# Patient Record
Sex: MALE | Race: WHITE | Employment: OTHER
[De-identification: names, ages, dates, MRNs, and addresses within clinical notes are randomized per-mention and may not be internally consistent; named-entity substitution may affect disease eponyms.]

---

## 2017-01-09 ENCOUNTER — TELEPHONE (OUTPATIENT)
Dept: NEUROLOGY | Facility: CLINIC | Age: 82
End: 2017-01-09

## 2017-01-17 ENCOUNTER — TELEPHONE (OUTPATIENT)
Dept: CARDIOLOGY | Facility: CLINIC | Age: 82
End: 2017-01-17

## 2017-01-17 DIAGNOSIS — R47.01 GLOBAL APHASIA: ICD-10-CM

## 2017-01-17 DIAGNOSIS — I63.412 CEREBRAL INFARCTION DUE TO EMBOLISM OF LEFT MIDDLE CEREBRAL ARTERY (HCC): ICD-10-CM

## 2017-01-17 DIAGNOSIS — E11.8 TYPE 2 DIABETES MELLITUS WITH COMPLICATION, WITH LONG-TERM CURRENT USE OF INSULIN (HCC): ICD-10-CM

## 2017-01-17 DIAGNOSIS — E55.9 UNSPECIFIED VITAMIN D DEFICIENCY: ICD-10-CM

## 2017-01-17 DIAGNOSIS — I69.391 DYSPHAGIA FOLLOWING CEREBROVASCULAR ACCIDENT: ICD-10-CM

## 2017-01-17 DIAGNOSIS — I63.512 ACUTE ISCHEMIC LEFT MCA STROKE (HCC): ICD-10-CM

## 2017-01-17 DIAGNOSIS — Z74.09 MOBILITY IMPAIRED: ICD-10-CM

## 2017-01-17 DIAGNOSIS — J96.01 ACUTE RESPIRATORY FAILURE WITH HYPOXIA (HCC): ICD-10-CM

## 2017-01-17 DIAGNOSIS — I10 ESSENTIAL HYPERTENSION, BENIGN: ICD-10-CM

## 2017-01-17 DIAGNOSIS — E78.2 MIXED HYPERLIPIDEMIA: Primary | ICD-10-CM

## 2017-01-17 DIAGNOSIS — G93.6 CEREBRAL EDEMA (HCC): ICD-10-CM

## 2017-01-17 DIAGNOSIS — Z79.4 TYPE 2 DIABETES MELLITUS WITH COMPLICATION, WITH LONG-TERM CURRENT USE OF INSULIN (HCC): ICD-10-CM

## 2017-01-17 DIAGNOSIS — I16.1 HYPERTENSIVE EMERGENCY: ICD-10-CM

## 2017-01-17 DIAGNOSIS — N17.9 AKI (ACUTE KIDNEY INJURY) (HCC): ICD-10-CM

## 2017-01-25 ENCOUNTER — INITIAL CONSULT (OUTPATIENT)
Dept: SURGERY | Facility: CLINIC | Age: 82
End: 2017-01-25

## 2017-01-25 VITALS
SYSTOLIC BLOOD PRESSURE: 129 MMHG | DIASTOLIC BLOOD PRESSURE: 70 MMHG | BODY MASS INDEX: 24.22 KG/M2 | HEIGHT: 71 IN | WEIGHT: 173 LBS | RESPIRATION RATE: 18 BRPM | HEART RATE: 76 BPM

## 2017-01-25 DIAGNOSIS — K94.23 LEAKING PEG TUBE (HCC): Primary | ICD-10-CM

## 2017-01-25 DIAGNOSIS — R19.4 CHANGE IN BOWEL HABITS: ICD-10-CM

## 2017-01-25 PROCEDURE — G8427 DOCREV CUR MEDS BY ELIG CLIN: HCPCS | Performed by: SURGERY

## 2017-01-25 PROCEDURE — 4040F PNEUMOC VAC/ADMIN/RCVD: CPT | Performed by: SURGERY

## 2017-01-25 PROCEDURE — 1036F TOBACCO NON-USER: CPT | Performed by: SURGERY

## 2017-01-25 PROCEDURE — 1123F ACP DISCUSS/DSCN MKR DOCD: CPT | Performed by: SURGERY

## 2017-01-25 PROCEDURE — 99204 OFFICE O/P NEW MOD 45 MIN: CPT | Performed by: SURGERY

## 2017-01-25 PROCEDURE — G8484 FLU IMMUNIZE NO ADMIN: HCPCS | Performed by: SURGERY

## 2017-01-25 PROCEDURE — G8598 ASA/ANTIPLAT THER USED: HCPCS | Performed by: SURGERY

## 2017-01-25 PROCEDURE — G8420 CALC BMI NORM PARAMETERS: HCPCS | Performed by: SURGERY

## 2017-01-25 PROCEDURE — 1111F DSCHRG MED/CURRENT MED MERGE: CPT | Performed by: SURGERY

## 2017-01-29 ASSESSMENT — ENCOUNTER SYMPTOMS
SORE THROAT: 0
DIARRHEA: 1
SHORTNESS OF BREATH: 0
NAUSEA: 0
VOMITING: 0
COUGH: 0
TROUBLE SWALLOWING: 1
ABDOMINAL PAIN: 0
CHOKING: 1
BACK PAIN: 0
BLOOD IN STOOL: 0

## 2017-01-31 ENCOUNTER — OFFICE VISIT (OUTPATIENT)
Dept: CARDIOLOGY | Facility: CLINIC | Age: 82
End: 2017-01-31

## 2017-01-31 VITALS
RESPIRATION RATE: 16 BRPM | OXYGEN SATURATION: 93 % | HEART RATE: 78 BPM | DIASTOLIC BLOOD PRESSURE: 60 MMHG | SYSTOLIC BLOOD PRESSURE: 120 MMHG

## 2017-01-31 DIAGNOSIS — E11.8 TYPE 2 DIABETES MELLITUS WITH COMPLICATION, WITH LONG-TERM CURRENT USE OF INSULIN (HCC): ICD-10-CM

## 2017-01-31 DIAGNOSIS — Z79.4 TYPE 2 DIABETES MELLITUS WITH COMPLICATION, WITH LONG-TERM CURRENT USE OF INSULIN (HCC): ICD-10-CM

## 2017-01-31 DIAGNOSIS — E55.9 UNSPECIFIED VITAMIN D DEFICIENCY: ICD-10-CM

## 2017-01-31 DIAGNOSIS — I48.91 NEW ONSET A-FIB (HCC): Primary | ICD-10-CM

## 2017-01-31 DIAGNOSIS — E78.2 MIXED HYPERLIPIDEMIA: ICD-10-CM

## 2017-01-31 DIAGNOSIS — I10 ESSENTIAL HYPERTENSION, BENIGN: ICD-10-CM

## 2017-01-31 DIAGNOSIS — I63.512 ACUTE ISCHEMIC LEFT MCA STROKE (HCC): ICD-10-CM

## 2017-01-31 PROCEDURE — 99204 OFFICE O/P NEW MOD 45 MIN: CPT | Performed by: INTERNAL MEDICINE

## 2017-01-31 PROCEDURE — G8420 CALC BMI NORM PARAMETERS: HCPCS | Performed by: INTERNAL MEDICINE

## 2017-01-31 PROCEDURE — 1123F ACP DISCUSS/DSCN MKR DOCD: CPT | Performed by: INTERNAL MEDICINE

## 2017-01-31 PROCEDURE — G8427 DOCREV CUR MEDS BY ELIG CLIN: HCPCS | Performed by: INTERNAL MEDICINE

## 2017-01-31 PROCEDURE — G8484 FLU IMMUNIZE NO ADMIN: HCPCS | Performed by: INTERNAL MEDICINE

## 2017-01-31 PROCEDURE — 1036F TOBACCO NON-USER: CPT | Performed by: INTERNAL MEDICINE

## 2017-01-31 PROCEDURE — 4040F PNEUMOC VAC/ADMIN/RCVD: CPT | Performed by: INTERNAL MEDICINE

## 2017-03-14 ENCOUNTER — HOSPITAL ENCOUNTER (OUTPATIENT)
Age: 82
Setting detail: SPECIMEN
Discharge: HOME | End: 2017-03-14
Payer: COMMERCIAL

## 2017-03-14 ENCOUNTER — HOSPITAL ENCOUNTER (OUTPATIENT)
Dept: PHARMACY | Age: 82
Discharge: HOME | End: 2017-03-14

## 2017-03-14 LAB
INR BLD: 4.3
PROTHROMBIN TIME: 46.6 SEC (ref 9–11.6)

## 2017-03-14 PROCEDURE — 85610 PROTHROMBIN TIME: CPT

## 2017-03-14 PROCEDURE — 36415 COLL VENOUS BLD VENIPUNCTURE: CPT

## 2017-03-21 ENCOUNTER — HOSPITAL ENCOUNTER (OUTPATIENT)
Dept: PHARMACY | Age: 82
Setting detail: THERAPIES SERIES
Discharge: HOME | End: 2017-03-21
Payer: MEDICARE

## 2017-03-21 ENCOUNTER — HOSPITAL ENCOUNTER (OUTPATIENT)
Age: 82
Setting detail: SPECIMEN
Discharge: HOME | End: 2017-03-21
Payer: COMMERCIAL

## 2017-03-21 LAB
INR BLD: 2.3
PROTHROMBIN TIME: 25.1 SEC (ref 9–11.6)

## 2017-03-21 PROCEDURE — 85610 PROTHROMBIN TIME: CPT

## 2017-03-21 PROCEDURE — 36415 COLL VENOUS BLD VENIPUNCTURE: CPT

## 2017-03-24 ENCOUNTER — HOSPITAL ENCOUNTER (OUTPATIENT)
Age: 82
Setting detail: SPECIMEN
Discharge: HOME | End: 2017-03-24
Payer: MEDICARE

## 2017-03-24 LAB
ANION GAP SERPL CALCULATED.3IONS-SCNC: 9 MMOL/L (ref 9–17)
BUN BLDV-MCNC: 18 MG/DL (ref 8–23)
BUN/CREAT BLD: 16 (ref 9–20)
CALCIUM SERPL-MCNC: 9 MG/DL (ref 8.6–10.4)
CHLORIDE BLD-SCNC: 101 MMOL/L (ref 98–107)
CO2: 27 MMOL/L (ref 20–31)
CREAT SERPL-MCNC: 1.13 MG/DL (ref 0.7–1.2)
GFR AFRICAN AMERICAN: >60 ML/MIN
GFR NON-AFRICAN AMERICAN: >60 ML/MIN
GFR SERPL CREATININE-BSD FRML MDRD: ABNORMAL ML/MIN/{1.73_M2}
GFR SERPL CREATININE-BSD FRML MDRD: ABNORMAL ML/MIN/{1.73_M2}
GLUCOSE BLD-MCNC: 112 MG/DL (ref 70–99)
POTASSIUM SERPL-SCNC: 4 MMOL/L (ref 3.7–5.3)
SODIUM BLD-SCNC: 137 MMOL/L (ref 135–144)

## 2017-03-24 PROCEDURE — 36415 COLL VENOUS BLD VENIPUNCTURE: CPT

## 2017-03-24 PROCEDURE — 80048 BASIC METABOLIC PNL TOTAL CA: CPT

## 2017-03-25 ENCOUNTER — HOSPITAL ENCOUNTER (EMERGENCY)
Age: 82
Discharge: HOME | End: 2017-03-25
Attending: FAMILY MEDICINE
Payer: MEDICARE

## 2017-03-25 ENCOUNTER — APPOINTMENT (OUTPATIENT)
Dept: GENERAL RADIOLOGY | Age: 82
End: 2017-03-25
Payer: MEDICARE

## 2017-03-25 VITALS
HEART RATE: 83 BPM | OXYGEN SATURATION: 99 % | RESPIRATION RATE: 28 BRPM | DIASTOLIC BLOOD PRESSURE: 51 MMHG | TEMPERATURE: 100.4 F | WEIGHT: 171.96 LBS | BODY MASS INDEX: 23.29 KG/M2 | SYSTOLIC BLOOD PRESSURE: 130 MMHG | HEIGHT: 72 IN

## 2017-03-25 DIAGNOSIS — K52.9 GASTROENTERITIS: ICD-10-CM

## 2017-03-25 DIAGNOSIS — E86.0 DEHYDRATION: Primary | ICD-10-CM

## 2017-03-25 LAB
ABSOLUTE BANDS #: 0.48 K/UL (ref 0–1)
ABSOLUTE EOS #: 0.12 K/UL (ref 0–0.4)
ABSOLUTE LYMPH #: 0.36 K/UL (ref 0.9–2.5)
ABSOLUTE MONO #: 0.36 K/UL (ref 0–1)
ALBUMIN SERPL-MCNC: 3.1 G/DL (ref 3.5–5.2)
ALBUMIN/GLOBULIN RATIO: ABNORMAL (ref 1–2.5)
ALP BLD-CCNC: 78 U/L (ref 40–129)
ALT SERPL-CCNC: 8 U/L (ref 5–41)
AMYLASE: 54 U/L (ref 28–100)
ANION GAP SERPL CALCULATED.3IONS-SCNC: 15 MMOL/L (ref 9–17)
AST SERPL-CCNC: 12 U/L
BANDS: 4 % (ref 0–10)
BASOPHILS # BLD: ABNORMAL % (ref 0–2)
BASOPHILS ABSOLUTE: ABNORMAL K/UL (ref 0–0.2)
BILIRUB SERPL-MCNC: 0.62 MG/DL (ref 0.3–1.2)
BNP INTERPRETATION: ABNORMAL
BUN BLDV-MCNC: 27 MG/DL (ref 8–23)
BUN/CREAT BLD: 19 (ref 9–20)
CALCIUM SERPL-MCNC: 8.8 MG/DL (ref 8.6–10.4)
CHLORIDE BLD-SCNC: 101 MMOL/L (ref 98–107)
CO2: 24 MMOL/L (ref 20–31)
CREAT SERPL-MCNC: 1.42 MG/DL (ref 0.7–1.2)
DIFFERENTIAL TYPE: ABNORMAL
DIRECT EXAM: NORMAL
EOSINOPHILS RELATIVE PERCENT: 1 % (ref 0–5)
GFR AFRICAN AMERICAN: 58 ML/MIN
GFR NON-AFRICAN AMERICAN: 48 ML/MIN
GFR SERPL CREATININE-BSD FRML MDRD: ABNORMAL ML/MIN/{1.73_M2}
GFR SERPL CREATININE-BSD FRML MDRD: ABNORMAL ML/MIN/{1.73_M2}
GLUCOSE BLD-MCNC: 173 MG/DL (ref 70–99)
HCT VFR BLD CALC: 31.4 % (ref 41–53)
HEMOGLOBIN: 10.5 G/DL (ref 13.5–17.5)
INR BLD: 2
LACTIC ACID, WHOLE BLOOD: NORMAL MMOL/L (ref 0.7–2.1)
LACTIC ACID: 2.2 MMOL/L (ref 0.5–2.2)
LIPASE: 20 U/L (ref 13–60)
LYMPHOCYTES # BLD: 3 % (ref 13–44)
Lab: NORMAL
MCH RBC QN AUTO: 29.3 PG (ref 26–34)
MCHC RBC AUTO-ENTMCNC: 33.5 G/DL (ref 31–37)
MCV RBC AUTO: 87.5 FL (ref 80–100)
MONOCYTES # BLD: 3 % (ref 5–9)
MORPHOLOGY: ABNORMAL
PARTIAL THROMBOPLASTIN TIME: 31.6 SEC (ref 21–33)
PDW BLD-RTO: 15.6 % (ref 12.1–15.2)
PLATELET # BLD: 408 K/UL (ref 140–450)
PLATELET ESTIMATE: ABNORMAL
PMV BLD AUTO: ABNORMAL FL (ref 6–12)
POTASSIUM SERPL-SCNC: 4.1 MMOL/L (ref 3.7–5.3)
PRO-BNP: 1380 PG/ML
PROTHROMBIN TIME: 21.4 SEC (ref 9–11.6)
RBC # BLD: 3.59 M/UL (ref 4.5–5.9)
RBC # BLD: ABNORMAL 10*6/UL
SEG NEUTROPHILS: 89 % (ref 39–75)
SEGMENTED NEUTROPHILS ABSOLUTE COUNT: 10.68 K/UL (ref 2.1–6.5)
SODIUM BLD-SCNC: 140 MMOL/L (ref 135–144)
SPECIMEN DESCRIPTION: NORMAL
STATUS: NORMAL
TOTAL PROTEIN: 6.6 G/DL (ref 6.4–8.3)
TROPONIN INTERP: ABNORMAL
TROPONIN T: 0.03 NG/ML
WBC # BLD: 12 K/UL (ref 3.5–11)
WBC # BLD: ABNORMAL 10*3/UL

## 2017-03-25 PROCEDURE — 6370000000 HC RX 637 (ALT 250 FOR IP): Performed by: FAMILY MEDICINE

## 2017-03-25 PROCEDURE — 87804 INFLUENZA ASSAY W/OPTIC: CPT

## 2017-03-25 PROCEDURE — 87040 BLOOD CULTURE FOR BACTERIA: CPT

## 2017-03-25 PROCEDURE — 99284 EMERGENCY DEPT VISIT MOD MDM: CPT

## 2017-03-25 PROCEDURE — 83880 ASSAY OF NATRIURETIC PEPTIDE: CPT

## 2017-03-25 PROCEDURE — 83605 ASSAY OF LACTIC ACID: CPT

## 2017-03-25 PROCEDURE — 71020 XR CHEST STANDARD TWO VW: CPT

## 2017-03-25 PROCEDURE — 93005 ELECTROCARDIOGRAM TRACING: CPT

## 2017-03-25 PROCEDURE — 80053 COMPREHEN METABOLIC PANEL: CPT

## 2017-03-25 PROCEDURE — 85610 PROTHROMBIN TIME: CPT

## 2017-03-25 PROCEDURE — 83690 ASSAY OF LIPASE: CPT

## 2017-03-25 PROCEDURE — 82150 ASSAY OF AMYLASE: CPT

## 2017-03-25 PROCEDURE — 36415 COLL VENOUS BLD VENIPUNCTURE: CPT

## 2017-03-25 PROCEDURE — 85730 THROMBOPLASTIN TIME PARTIAL: CPT

## 2017-03-25 PROCEDURE — 85025 COMPLETE CBC W/AUTO DIFF WBC: CPT

## 2017-03-25 PROCEDURE — 84484 ASSAY OF TROPONIN QUANT: CPT

## 2017-03-25 RX ORDER — ACETAMINOPHEN 500 MG
1000 TABLET ORAL ONCE
Status: COMPLETED | OUTPATIENT
Start: 2017-03-25 | End: 2017-03-25

## 2017-03-25 RX ORDER — INSULIN GLARGINE 100 [IU]/ML
INJECTION, SOLUTION SUBCUTANEOUS SEE ADMIN INSTRUCTIONS
COMMUNITY

## 2017-03-25 RX ORDER — WARFARIN SODIUM 5 MG/1
5 TABLET ORAL SEE ADMIN INSTRUCTIONS
COMMUNITY

## 2017-03-25 RX ADMIN — ACETAMINOPHEN 1000 MG: 500 TABLET ORAL at 11:43

## 2017-03-25 ASSESSMENT — PAIN SCALES - GENERAL: PAINLEVEL_OUTOF10: 0

## 2017-03-26 ENCOUNTER — HOSPITAL ENCOUNTER (OUTPATIENT)
Age: 82
Discharge: HOME | End: 2017-03-26
Payer: COMMERCIAL

## 2017-03-26 LAB
ANION GAP SERPL CALCULATED.3IONS-SCNC: 10 MMOL/L (ref 9–17)
BUN BLDV-MCNC: 27 MG/DL (ref 8–23)
BUN/CREAT BLD: 20 (ref 9–20)
CALCIUM SERPL-MCNC: 8.7 MG/DL (ref 8.6–10.4)
CHLORIDE BLD-SCNC: 105 MMOL/L (ref 98–107)
CO2: 25 MMOL/L (ref 20–31)
CREAT SERPL-MCNC: 1.32 MG/DL (ref 0.7–1.2)
GFR AFRICAN AMERICAN: >60 ML/MIN
GFR NON-AFRICAN AMERICAN: 52 ML/MIN
GFR SERPL CREATININE-BSD FRML MDRD: ABNORMAL ML/MIN/{1.73_M2}
GFR SERPL CREATININE-BSD FRML MDRD: ABNORMAL ML/MIN/{1.73_M2}
GLUCOSE BLD-MCNC: 68 MG/DL (ref 70–99)
POTASSIUM SERPL-SCNC: 4 MMOL/L (ref 3.7–5.3)
SODIUM BLD-SCNC: 140 MMOL/L (ref 135–144)

## 2017-03-26 PROCEDURE — 36415 COLL VENOUS BLD VENIPUNCTURE: CPT

## 2017-03-26 PROCEDURE — 80048 BASIC METABOLIC PNL TOTAL CA: CPT

## 2017-03-28 ENCOUNTER — HOSPITAL ENCOUNTER (OUTPATIENT)
Age: 82
Setting detail: SPECIMEN
Discharge: HOME | End: 2017-03-28
Payer: MEDICARE

## 2017-03-28 ENCOUNTER — HOSPITAL ENCOUNTER (OUTPATIENT)
Dept: PHARMACY | Age: 82
Discharge: HOME | End: 2017-03-28

## 2017-03-28 LAB
ANION GAP SERPL CALCULATED.3IONS-SCNC: 10 MMOL/L (ref 9–17)
BUN BLDV-MCNC: 18 MG/DL (ref 8–23)
BUN/CREAT BLD: 16 (ref 9–20)
CALCIUM SERPL-MCNC: 8.5 MG/DL (ref 8.6–10.4)
CHLORIDE BLD-SCNC: 103 MMOL/L (ref 98–107)
CO2: 26 MMOL/L (ref 20–31)
CREAT SERPL-MCNC: 1.13 MG/DL (ref 0.7–1.2)
GFR AFRICAN AMERICAN: >60 ML/MIN
GFR NON-AFRICAN AMERICAN: >60 ML/MIN
GFR SERPL CREATININE-BSD FRML MDRD: ABNORMAL ML/MIN/{1.73_M2}
GFR SERPL CREATININE-BSD FRML MDRD: ABNORMAL ML/MIN/{1.73_M2}
GLUCOSE BLD-MCNC: 87 MG/DL (ref 70–99)
INR BLD: 4.3
POTASSIUM SERPL-SCNC: 3.7 MMOL/L (ref 3.7–5.3)
PROTHROMBIN TIME: 47 SEC (ref 9–11.6)
SODIUM BLD-SCNC: 139 MMOL/L (ref 135–144)

## 2017-03-28 PROCEDURE — 85610 PROTHROMBIN TIME: CPT

## 2017-03-28 PROCEDURE — 36415 COLL VENOUS BLD VENIPUNCTURE: CPT

## 2017-03-28 PROCEDURE — 80048 BASIC METABOLIC PNL TOTAL CA: CPT

## 2017-03-28 RX ORDER — CITALOPRAM 10 MG/1
20 TABLET ORAL 2 TIMES DAILY
COMMUNITY
Start: 2017-03-28

## 2017-03-31 LAB
CULTURE: NORMAL
Lab: NORMAL
SPECIMEN DESCRIPTION: NORMAL
STATUS: NORMAL

## 2017-04-04 ENCOUNTER — HOSPITAL ENCOUNTER (OUTPATIENT)
Age: 82
Setting detail: SPECIMEN
Discharge: HOME | End: 2017-04-04
Payer: MEDICARE

## 2017-04-04 ENCOUNTER — HOSPITAL ENCOUNTER (OUTPATIENT)
Dept: PHARMACY | Age: 82
Discharge: HOME | End: 2017-04-04

## 2017-04-04 LAB
INR BLD: 2.5
PROTHROMBIN TIME: 26.8 SEC (ref 9–11.6)

## 2017-04-04 PROCEDURE — 36415 COLL VENOUS BLD VENIPUNCTURE: CPT

## 2017-04-04 PROCEDURE — 85610 PROTHROMBIN TIME: CPT

## 2017-04-11 ENCOUNTER — HOSPITAL ENCOUNTER (OUTPATIENT)
Dept: PHARMACY | Age: 82
Setting detail: THERAPIES SERIES
Discharge: HOME | End: 2017-04-11
Payer: MEDICARE

## 2017-04-11 ENCOUNTER — HOSPITAL ENCOUNTER (OUTPATIENT)
Age: 82
Setting detail: SPECIMEN
Discharge: HOME | End: 2017-04-11
Payer: COMMERCIAL

## 2017-04-11 LAB
INR BLD: 2.1
PROTHROMBIN TIME: 22 SEC (ref 9–11.6)

## 2017-04-11 PROCEDURE — 85610 PROTHROMBIN TIME: CPT

## 2017-04-11 PROCEDURE — 36415 COLL VENOUS BLD VENIPUNCTURE: CPT

## 2017-04-18 ENCOUNTER — HOSPITAL ENCOUNTER (OUTPATIENT)
Age: 82
Setting detail: SPECIMEN
Discharge: HOME | End: 2017-04-18
Payer: MEDICARE

## 2017-04-18 ENCOUNTER — HOSPITAL ENCOUNTER (OUTPATIENT)
Dept: PHARMACY | Age: 82
Setting detail: THERAPIES SERIES
Discharge: HOME | End: 2017-04-18
Payer: MEDICARE

## 2017-04-18 LAB
INR BLD: 2.4
PROTHROMBIN TIME: 25.7 SEC (ref 9–11.6)

## 2017-04-18 PROCEDURE — 85610 PROTHROMBIN TIME: CPT

## 2017-04-18 PROCEDURE — 36415 COLL VENOUS BLD VENIPUNCTURE: CPT

## 2017-04-20 ENCOUNTER — HOSPITAL ENCOUNTER (OUTPATIENT)
Age: 82
Setting detail: SPECIMEN
Discharge: HOME | End: 2017-04-20
Payer: MEDICARE

## 2017-04-20 LAB
BILIRUBIN URINE: NEGATIVE
COLOR: YELLOW
COMMENT UA: ABNORMAL
GLUCOSE URINE: NEGATIVE
KETONES, URINE: NEGATIVE
LEUKOCYTE ESTERASE, URINE: NEGATIVE
NITRITE, URINE: NEGATIVE
PH UA: 7 (ref 5–8)
PROTEIN UA: ABNORMAL
SPECIFIC GRAVITY UA: 1.01 (ref 1–1.03)
TURBIDITY: CLEAR
URINE HGB: NEGATIVE
UROBILINOGEN, URINE: NORMAL

## 2017-04-20 PROCEDURE — 87086 URINE CULTURE/COLONY COUNT: CPT

## 2017-04-20 PROCEDURE — 87186 SC STD MICRODIL/AGAR DIL: CPT

## 2017-04-20 PROCEDURE — 81003 URINALYSIS AUTO W/O SCOPE: CPT

## 2017-04-20 PROCEDURE — 87088 URINE BACTERIA CULTURE: CPT

## 2017-04-21 LAB
CULTURE: ABNORMAL
CULTURE: ABNORMAL
Lab: ABNORMAL
ORGANISM: ABNORMAL
SPECIMEN DESCRIPTION: ABNORMAL
SPECIMEN DESCRIPTION: ABNORMAL
STATUS: ABNORMAL

## 2017-04-24 ENCOUNTER — HOSPITAL ENCOUNTER (OUTPATIENT)
Age: 82
Setting detail: SPECIMEN
Discharge: HOME | End: 2017-04-24
Payer: MEDICARE

## 2017-04-24 LAB
ANION GAP SERPL CALCULATED.3IONS-SCNC: 6 MMOL/L (ref 9–17)
BUN BLDV-MCNC: 17 MG/DL (ref 8–23)
BUN/CREAT BLD: 16 (ref 9–20)
CALCIUM SERPL-MCNC: 8.6 MG/DL (ref 8.6–10.4)
CHLORIDE BLD-SCNC: 103 MMOL/L (ref 98–107)
CO2: 30 MMOL/L (ref 20–31)
CREAT SERPL-MCNC: 1.08 MG/DL (ref 0.7–1.2)
GFR AFRICAN AMERICAN: >60 ML/MIN
GFR NON-AFRICAN AMERICAN: >60 ML/MIN
GFR SERPL CREATININE-BSD FRML MDRD: ABNORMAL ML/MIN/{1.73_M2}
GFR SERPL CREATININE-BSD FRML MDRD: ABNORMAL ML/MIN/{1.73_M2}
GLUCOSE BLD-MCNC: 60 MG/DL (ref 70–99)
POTASSIUM SERPL-SCNC: 4 MMOL/L (ref 3.7–5.3)
SODIUM BLD-SCNC: 139 MMOL/L (ref 135–144)

## 2017-04-24 PROCEDURE — 36415 COLL VENOUS BLD VENIPUNCTURE: CPT

## 2017-04-24 PROCEDURE — 80048 BASIC METABOLIC PNL TOTAL CA: CPT

## 2017-04-25 ENCOUNTER — HOSPITAL ENCOUNTER (OUTPATIENT)
Age: 82
Setting detail: SPECIMEN
Discharge: HOME | End: 2017-04-25
Payer: MEDICARE

## 2017-04-25 LAB
INR BLD: 2.2
PROTHROMBIN TIME: 23.9 SEC (ref 9–11.6)

## 2017-04-25 PROCEDURE — 36415 COLL VENOUS BLD VENIPUNCTURE: CPT

## 2017-04-25 PROCEDURE — 85610 PROTHROMBIN TIME: CPT

## 2017-05-09 ENCOUNTER — HOSPITAL ENCOUNTER (OUTPATIENT)
Dept: PHARMACY | Age: 82
Setting detail: THERAPIES SERIES
Discharge: HOME | End: 2017-05-09
Payer: MEDICARE

## 2017-05-09 ENCOUNTER — HOSPITAL ENCOUNTER (OUTPATIENT)
Age: 82
Setting detail: SPECIMEN
Discharge: HOME | End: 2017-05-09
Payer: MEDICARE

## 2017-05-09 LAB
INR BLD: 2.1
PROTHROMBIN TIME: 22.2 SEC (ref 9–11.6)

## 2017-05-09 PROCEDURE — 36415 COLL VENOUS BLD VENIPUNCTURE: CPT

## 2017-05-09 PROCEDURE — 85610 PROTHROMBIN TIME: CPT

## 2017-05-23 ENCOUNTER — HOSPITAL ENCOUNTER (OUTPATIENT)
Age: 82
Setting detail: SPECIMEN
Discharge: HOME | End: 2017-05-23
Payer: MEDICARE

## 2017-05-23 ENCOUNTER — HOSPITAL ENCOUNTER (OUTPATIENT)
Dept: PHARMACY | Age: 82
Setting detail: THERAPIES SERIES
Discharge: HOME | End: 2017-05-23
Payer: MEDICARE

## 2017-05-23 LAB
INR BLD: 1.4
PROTHROMBIN TIME: 15 SEC (ref 9–11.6)

## 2017-05-23 PROCEDURE — 36415 COLL VENOUS BLD VENIPUNCTURE: CPT

## 2017-05-23 PROCEDURE — 85610 PROTHROMBIN TIME: CPT

## 2017-05-23 RX ORDER — FUROSEMIDE 20 MG/1
40 TABLET ORAL DAILY
COMMUNITY

## 2017-05-24 ENCOUNTER — HOSPITAL ENCOUNTER (OUTPATIENT)
Age: 82
Setting detail: SPECIMEN
Discharge: HOME | End: 2017-05-24
Payer: MEDICARE

## 2017-05-24 LAB
ANION GAP SERPL CALCULATED.3IONS-SCNC: 11 MMOL/L (ref 9–17)
BUN BLDV-MCNC: 23 MG/DL (ref 8–23)
BUN/CREAT BLD: 17 (ref 9–20)
CALCIUM SERPL-MCNC: 9 MG/DL (ref 8.6–10.4)
CHLORIDE BLD-SCNC: 102 MMOL/L (ref 98–107)
CO2: 28 MMOL/L (ref 20–31)
CREAT SERPL-MCNC: 1.32 MG/DL (ref 0.7–1.2)
GFR AFRICAN AMERICAN: >60 ML/MIN
GFR NON-AFRICAN AMERICAN: 52 ML/MIN
GFR SERPL CREATININE-BSD FRML MDRD: ABNORMAL ML/MIN/{1.73_M2}
GFR SERPL CREATININE-BSD FRML MDRD: ABNORMAL ML/MIN/{1.73_M2}
GLUCOSE BLD-MCNC: 106 MG/DL (ref 70–99)
POTASSIUM SERPL-SCNC: 4.3 MMOL/L (ref 3.7–5.3)
SODIUM BLD-SCNC: 141 MMOL/L (ref 135–144)

## 2017-05-24 PROCEDURE — 36415 COLL VENOUS BLD VENIPUNCTURE: CPT

## 2017-05-24 PROCEDURE — 80048 BASIC METABOLIC PNL TOTAL CA: CPT

## 2017-05-30 ENCOUNTER — HOSPITAL ENCOUNTER (OUTPATIENT)
Dept: PHARMACY | Age: 82
Setting detail: THERAPIES SERIES
Discharge: HOME | End: 2017-05-30
Payer: MEDICARE

## 2017-05-30 ENCOUNTER — HOSPITAL ENCOUNTER (OUTPATIENT)
Age: 82
Setting detail: SPECIMEN
Discharge: HOME | End: 2017-05-30
Payer: MEDICARE

## 2017-05-30 LAB
INR BLD: 1.4
PROTHROMBIN TIME: 15 SEC (ref 9–11.6)

## 2017-05-30 PROCEDURE — 85610 PROTHROMBIN TIME: CPT

## 2017-05-30 PROCEDURE — 36415 COLL VENOUS BLD VENIPUNCTURE: CPT

## 2017-06-06 ENCOUNTER — HOSPITAL ENCOUNTER (OUTPATIENT)
Dept: PHARMACY | Age: 82
Discharge: HOME | End: 2017-06-06

## 2017-06-06 ENCOUNTER — HOSPITAL ENCOUNTER (OUTPATIENT)
Age: 82
Discharge: HOME | End: 2017-06-06
Payer: MEDICARE

## 2017-06-06 LAB
INR BLD: 1.6
PROTHROMBIN TIME: 17.6 SEC (ref 9–11.6)

## 2017-06-06 PROCEDURE — 36415 COLL VENOUS BLD VENIPUNCTURE: CPT

## 2017-06-06 PROCEDURE — 85610 PROTHROMBIN TIME: CPT

## 2017-06-13 ENCOUNTER — HOSPITAL ENCOUNTER (OUTPATIENT)
Dept: PHARMACY | Age: 82
Setting detail: THERAPIES SERIES
Discharge: HOME | End: 2017-06-13
Payer: MEDICARE

## 2017-06-13 ENCOUNTER — HOSPITAL ENCOUNTER (OUTPATIENT)
Age: 82
Setting detail: SPECIMEN
Discharge: HOME | End: 2017-06-13
Payer: COMMERCIAL

## 2017-06-13 LAB
INR BLD: 1.9
PROTHROMBIN TIME: 20 SEC (ref 9–11.6)

## 2017-06-13 PROCEDURE — 85610 PROTHROMBIN TIME: CPT

## 2017-06-13 PROCEDURE — 36415 COLL VENOUS BLD VENIPUNCTURE: CPT

## 2017-06-22 ENCOUNTER — TELEPHONE (OUTPATIENT)
Dept: PHARMACY | Age: 82
End: 2017-06-22

## 2017-07-06 ENCOUNTER — ANTI-COAG VISIT (OUTPATIENT)
Dept: PHARMACY | Age: 82
End: 2017-07-06

## 2017-08-24 ENCOUNTER — HOSPITAL ENCOUNTER (OUTPATIENT)
Age: 82
Setting detail: SPECIMEN
Discharge: HOME | End: 2017-08-24
Payer: MEDICARE

## 2017-08-24 LAB
ANION GAP SERPL CALCULATED.3IONS-SCNC: 11 MMOL/L (ref 9–17)
BUN BLDV-MCNC: 36 MG/DL (ref 8–23)
BUN/CREAT BLD: 22 (ref 9–20)
CALCIUM SERPL-MCNC: 9.3 MG/DL (ref 8.6–10.4)
CHLORIDE BLD-SCNC: 98 MMOL/L (ref 98–107)
CO2: 28 MMOL/L (ref 20–31)
CREAT SERPL-MCNC: 1.65 MG/DL (ref 0.7–1.2)
GFR AFRICAN AMERICAN: 49 ML/MIN
GFR NON-AFRICAN AMERICAN: 40 ML/MIN
GFR SERPL CREATININE-BSD FRML MDRD: ABNORMAL ML/MIN/{1.73_M2}
GFR SERPL CREATININE-BSD FRML MDRD: ABNORMAL ML/MIN/{1.73_M2}
GLUCOSE BLD-MCNC: 226 MG/DL (ref 70–99)
POTASSIUM SERPL-SCNC: 4.3 MMOL/L (ref 3.7–5.3)
SODIUM BLD-SCNC: 137 MMOL/L (ref 135–144)

## 2017-08-24 PROCEDURE — 80048 BASIC METABOLIC PNL TOTAL CA: CPT

## 2017-08-26 LAB
EKG ATRIAL RATE: 89 BPM
EKG P AXIS: 21 DEGREES
EKG P-R INTERVAL: 146 MS
EKG Q-T INTERVAL: 360 MS
EKG QRS DURATION: 70 MS
EKG QTC CALCULATION (BAZETT): 438 MS
EKG R AXIS: 3 DEGREES
EKG T AXIS: 13 DEGREES
EKG VENTRICULAR RATE: 89 BPM

## 2018-02-23 ENCOUNTER — HOSPITAL ENCOUNTER (OUTPATIENT)
Age: 83
Setting detail: SPECIMEN
Discharge: HOME | End: 2018-02-23
Payer: MEDICARE

## 2018-02-23 LAB
ANION GAP SERPL CALCULATED.3IONS-SCNC: 14 MMOL/L (ref 9–17)
BUN BLDV-MCNC: 23 MG/DL (ref 8–23)
BUN/CREAT BLD: 15 (ref 9–20)
CALCIUM SERPL-MCNC: 9.6 MG/DL (ref 8.6–10.4)
CHLORIDE BLD-SCNC: 98 MMOL/L (ref 98–107)
CO2: 28 MMOL/L (ref 20–31)
CREAT SERPL-MCNC: 1.53 MG/DL (ref 0.7–1.2)
GFR AFRICAN AMERICAN: 53 ML/MIN
GFR NON-AFRICAN AMERICAN: 44 ML/MIN
GFR SERPL CREATININE-BSD FRML MDRD: ABNORMAL ML/MIN/{1.73_M2}
GFR SERPL CREATININE-BSD FRML MDRD: ABNORMAL ML/MIN/{1.73_M2}
GLUCOSE BLD-MCNC: 130 MG/DL (ref 70–99)
MAGNESIUM: 2.1 MG/DL (ref 1.6–2.6)
POTASSIUM SERPL-SCNC: 4.3 MMOL/L (ref 3.7–5.3)
SODIUM BLD-SCNC: 140 MMOL/L (ref 135–144)

## 2018-02-23 PROCEDURE — 83735 ASSAY OF MAGNESIUM: CPT

## 2018-02-23 PROCEDURE — 80048 BASIC METABOLIC PNL TOTAL CA: CPT

## 2020-02-19 ENCOUNTER — APPOINTMENT (OUTPATIENT)
Dept: GENERAL RADIOLOGY | Age: 85
DRG: 194 | End: 2020-02-19
Payer: MEDICARE

## 2020-02-19 ENCOUNTER — HOSPITAL ENCOUNTER (INPATIENT)
Age: 85
LOS: 3 days | Discharge: NURSING, REHAB OR LONG-TERM CARE | DRG: 194 | End: 2020-02-22
Attending: EMERGENCY MEDICINE | Admitting: INTERNAL MEDICINE
Payer: MEDICARE

## 2020-02-19 PROBLEM — I50.9 CHF (CONGESTIVE HEART FAILURE) (HCC): Status: ACTIVE | Noted: 2020-02-19

## 2020-02-19 PROBLEM — I50.9 CHF WITH UNKNOWN LVEF (HCC): Status: ACTIVE | Noted: 2020-02-19

## 2020-02-19 LAB
-: ABNORMAL
ABSOLUTE EOS #: 0.1 K/UL (ref 0–0.4)
ABSOLUTE IMMATURE GRANULOCYTE: ABNORMAL K/UL (ref 0–0.3)
ABSOLUTE LYMPH #: 1.5 K/UL (ref 1–4.8)
ABSOLUTE MONO #: 0.8 K/UL (ref 0–1)
AMORPHOUS: ABNORMAL
ANION GAP SERPL CALCULATED.3IONS-SCNC: 13 MMOL/L (ref 9–17)
BACTERIA: ABNORMAL
BASOPHILS # BLD: 0 % (ref 0–2)
BASOPHILS ABSOLUTE: 0 K/UL (ref 0–0.2)
BILIRUBIN URINE: NEGATIVE
BNP INTERPRETATION: ABNORMAL
BUN BLDV-MCNC: 26 MG/DL (ref 8–23)
BUN/CREAT BLD: 16 (ref 9–20)
CALCIUM SERPL-MCNC: 9.3 MG/DL (ref 8.6–10.4)
CASTS UA: ABNORMAL /LPF
CHLORIDE BLD-SCNC: 102 MMOL/L (ref 98–107)
CO2: 23 MMOL/L (ref 20–31)
COLOR: YELLOW
COMMENT UA: ABNORMAL
CREAT SERPL-MCNC: 1.62 MG/DL (ref 0.7–1.2)
CRYSTALS, UA: ABNORMAL /HPF
DIFFERENTIAL TYPE: YES
EKG ATRIAL RATE: 67 BPM
EKG Q-T INTERVAL: 428 MS
EKG QRS DURATION: 78 MS
EKG QTC CALCULATION (BAZETT): 452 MS
EKG R AXIS: 17 DEGREES
EKG T AXIS: -10 DEGREES
EKG VENTRICULAR RATE: 67 BPM
EOSINOPHILS RELATIVE PERCENT: 1 % (ref 0–5)
EPITHELIAL CELLS UA: ABNORMAL /HPF
ESTIMATED AVERAGE GLUCOSE: 143 MG/DL
GFR AFRICAN AMERICAN: 49 ML/MIN
GFR NON-AFRICAN AMERICAN: 41 ML/MIN
GFR SERPL CREATININE-BSD FRML MDRD: ABNORMAL ML/MIN/{1.73_M2}
GFR SERPL CREATININE-BSD FRML MDRD: ABNORMAL ML/MIN/{1.73_M2}
GLUCOSE BLD-MCNC: 110 MG/DL (ref 70–99)
GLUCOSE BLD-MCNC: 111 MG/DL (ref 65–99)
GLUCOSE BLD-MCNC: 132 MG/DL (ref 65–99)
GLUCOSE BLD-MCNC: 159 MG/DL (ref 65–99)
GLUCOSE URINE: NEGATIVE
HBA1C MFR BLD: 6.6 % (ref 4.8–5.9)
HCT VFR BLD CALC: 36.3 % (ref 41–53)
HEMOGLOBIN: 12.1 G/DL (ref 13.5–17.5)
IMMATURE GRANULOCYTES: ABNORMAL %
INR BLD: 1.7
KETONES, URINE: NEGATIVE
LEUKOCYTE ESTERASE, URINE: ABNORMAL
LYMPHOCYTES # BLD: 16 % (ref 13–44)
MCH RBC QN AUTO: 31.3 PG (ref 26–34)
MCHC RBC AUTO-ENTMCNC: 33.5 G/DL (ref 31–37)
MCV RBC AUTO: 93.7 FL (ref 80–100)
MONOCYTES # BLD: 8 % (ref 5–9)
MUCUS: ABNORMAL
NITRITE, URINE: POSITIVE
NRBC AUTOMATED: ABNORMAL PER 100 WBC
OTHER OBSERVATIONS UA: ABNORMAL
PDW BLD-RTO: 14.7 % (ref 12.1–15.2)
PH UA: 6 (ref 5–8)
PLATELET # BLD: 325 K/UL (ref 140–450)
PLATELET ESTIMATE: ABNORMAL
PMV BLD AUTO: ABNORMAL FL (ref 6–12)
POTASSIUM SERPL-SCNC: 4.4 MMOL/L (ref 3.7–5.3)
PRO-BNP: 1907 PG/ML
PROTEIN UA: ABNORMAL
PROTHROMBIN TIME: 17.2 SEC (ref 9–11.6)
RBC # BLD: 3.88 M/UL (ref 4.5–5.9)
RBC # BLD: ABNORMAL 10*6/UL
RBC UA: ABNORMAL /HPF (ref 0–2)
RENAL EPITHELIAL, UA: ABNORMAL /HPF
SEG NEUTROPHILS: 75 % (ref 39–75)
SEGMENTED NEUTROPHILS ABSOLUTE COUNT: 7.3 K/UL (ref 2.1–6.5)
SODIUM BLD-SCNC: 138 MMOL/L (ref 135–144)
SPECIFIC GRAVITY UA: 1.01 (ref 1–1.03)
TRICHOMONAS: ABNORMAL
TROPONIN INTERP: ABNORMAL
TROPONIN T: 0.03 NG/ML
TROPONIN T: 0.04 NG/ML
TROPONIN T: 0.04 NG/ML
TROPONIN, HIGH SENSITIVITY: ABNORMAL NG/L (ref 0–22)
TSH SERPL DL<=0.05 MIU/L-ACNC: 1.58 MIU/L (ref 0.3–5)
TURBIDITY: ABNORMAL
URINE HGB: ABNORMAL
UROBILINOGEN, URINE: NORMAL
WBC # BLD: 9.8 K/UL (ref 3.5–11)
WBC # BLD: ABNORMAL 10*3/UL
WBC UA: ABNORMAL /HPF
YEAST: ABNORMAL

## 2020-02-19 PROCEDURE — 6370000000 HC RX 637 (ALT 250 FOR IP): Performed by: INTERNAL MEDICINE

## 2020-02-19 PROCEDURE — 94664 DEMO&/EVAL PT USE INHALER: CPT

## 2020-02-19 PROCEDURE — 83036 HEMOGLOBIN GLYCOSYLATED A1C: CPT

## 2020-02-19 PROCEDURE — 82947 ASSAY GLUCOSE BLOOD QUANT: CPT

## 2020-02-19 PROCEDURE — 93010 ELECTROCARDIOGRAM REPORT: CPT | Performed by: INTERNAL MEDICINE

## 2020-02-19 PROCEDURE — 87186 SC STD MICRODIL/AGAR DIL: CPT

## 2020-02-19 PROCEDURE — 87086 URINE CULTURE/COLONY COUNT: CPT

## 2020-02-19 PROCEDURE — 2700000000 HC OXYGEN THERAPY PER DAY

## 2020-02-19 PROCEDURE — 6370000000 HC RX 637 (ALT 250 FOR IP): Performed by: EMERGENCY MEDICINE

## 2020-02-19 PROCEDURE — 99285 EMERGENCY DEPT VISIT HI MDM: CPT

## 2020-02-19 PROCEDURE — 96374 THER/PROPH/DIAG INJ IV PUSH: CPT

## 2020-02-19 PROCEDURE — 81001 URINALYSIS AUTO W/SCOPE: CPT

## 2020-02-19 PROCEDURE — 84443 ASSAY THYROID STIM HORMONE: CPT

## 2020-02-19 PROCEDURE — 80048 BASIC METABOLIC PNL TOTAL CA: CPT

## 2020-02-19 PROCEDURE — 87088 URINE BACTERIA CULTURE: CPT

## 2020-02-19 PROCEDURE — 94761 N-INVAS EAR/PLS OXIMETRY MLT: CPT

## 2020-02-19 PROCEDURE — 93005 ELECTROCARDIOGRAM TRACING: CPT | Performed by: EMERGENCY MEDICINE

## 2020-02-19 PROCEDURE — 2580000003 HC RX 258: Performed by: EMERGENCY MEDICINE

## 2020-02-19 PROCEDURE — 2580000003 HC RX 258: Performed by: INTERNAL MEDICINE

## 2020-02-19 PROCEDURE — 84484 ASSAY OF TROPONIN QUANT: CPT

## 2020-02-19 PROCEDURE — 85025 COMPLETE CBC W/AUTO DIFF WBC: CPT

## 2020-02-19 PROCEDURE — 6360000002 HC RX W HCPCS: Performed by: INTERNAL MEDICINE

## 2020-02-19 PROCEDURE — 85610 PROTHROMBIN TIME: CPT

## 2020-02-19 PROCEDURE — 1200000000 HC SEMI PRIVATE

## 2020-02-19 PROCEDURE — 83880 ASSAY OF NATRIURETIC PEPTIDE: CPT

## 2020-02-19 PROCEDURE — 71045 X-RAY EXAM CHEST 1 VIEW: CPT

## 2020-02-19 PROCEDURE — 36415 COLL VENOUS BLD VENIPUNCTURE: CPT

## 2020-02-19 PROCEDURE — 6360000002 HC RX W HCPCS: Performed by: EMERGENCY MEDICINE

## 2020-02-19 PROCEDURE — 94640 AIRWAY INHALATION TREATMENT: CPT

## 2020-02-19 RX ORDER — DEXTROSE MONOHYDRATE 50 MG/ML
100 INJECTION, SOLUTION INTRAVENOUS PRN
Status: DISCONTINUED | OUTPATIENT
Start: 2020-02-19 | End: 2020-02-22 | Stop reason: HOSPADM

## 2020-02-19 RX ORDER — PROMETHAZINE HYDROCHLORIDE 25 MG/1
12.5 TABLET ORAL EVERY 6 HOURS PRN
Status: DISCONTINUED | OUTPATIENT
Start: 2020-02-19 | End: 2020-02-22 | Stop reason: HOSPADM

## 2020-02-19 RX ORDER — SENNA PLUS 8.6 MG/1
2 TABLET ORAL 2 TIMES DAILY
Status: DISCONTINUED | OUTPATIENT
Start: 2020-02-19 | End: 2020-02-22 | Stop reason: HOSPADM

## 2020-02-19 RX ORDER — SODIUM CHLORIDE 0.9 % (FLUSH) 0.9 %
10 SYRINGE (ML) INJECTION EVERY 12 HOURS SCHEDULED
Status: DISCONTINUED | OUTPATIENT
Start: 2020-02-19 | End: 2020-02-22 | Stop reason: HOSPADM

## 2020-02-19 RX ORDER — LISINOPRIL 5 MG/1
5 TABLET ORAL DAILY
Status: DISCONTINUED | OUTPATIENT
Start: 2020-02-19 | End: 2020-02-22 | Stop reason: HOSPADM

## 2020-02-19 RX ORDER — NICOTINE POLACRILEX 4 MG
15 LOZENGE BUCCAL PRN
Status: DISCONTINUED | OUTPATIENT
Start: 2020-02-19 | End: 2020-02-22 | Stop reason: HOSPADM

## 2020-02-19 RX ORDER — DEXTROSE MONOHYDRATE 25 G/50ML
12.5 INJECTION, SOLUTION INTRAVENOUS PRN
Status: DISCONTINUED | OUTPATIENT
Start: 2020-02-19 | End: 2020-02-22 | Stop reason: HOSPADM

## 2020-02-19 RX ORDER — HYDRALAZINE HYDROCHLORIDE 50 MG/1
50 TABLET, FILM COATED ORAL 3 TIMES DAILY
COMMUNITY

## 2020-02-19 RX ORDER — CITALOPRAM 20 MG/1
10 TABLET ORAL 2 TIMES DAILY
Status: DISCONTINUED | OUTPATIENT
Start: 2020-02-19 | End: 2020-02-22 | Stop reason: HOSPADM

## 2020-02-19 RX ORDER — SODIUM CHLORIDE 0.9 % (FLUSH) 0.9 %
10 SYRINGE (ML) INJECTION PRN
Status: DISCONTINUED | OUTPATIENT
Start: 2020-02-19 | End: 2020-02-22 | Stop reason: HOSPADM

## 2020-02-19 RX ORDER — HYDRALAZINE HYDROCHLORIDE 25 MG/1
50 TABLET, FILM COATED ORAL 3 TIMES DAILY
Status: DISCONTINUED | OUTPATIENT
Start: 2020-02-19 | End: 2020-02-22 | Stop reason: HOSPADM

## 2020-02-19 RX ORDER — WARFARIN SODIUM 5 MG/1
5 TABLET ORAL SEE ADMIN INSTRUCTIONS
Status: DISCONTINUED | OUTPATIENT
Start: 2020-02-19 | End: 2020-02-19 | Stop reason: SDUPTHER

## 2020-02-19 RX ORDER — ONDANSETRON 2 MG/ML
4 INJECTION INTRAMUSCULAR; INTRAVENOUS EVERY 6 HOURS PRN
Status: DISCONTINUED | OUTPATIENT
Start: 2020-02-19 | End: 2020-02-22 | Stop reason: HOSPADM

## 2020-02-19 RX ORDER — ACETAMINOPHEN 500 MG
1000 TABLET ORAL 3 TIMES DAILY
Status: DISCONTINUED | OUTPATIENT
Start: 2020-02-19 | End: 2020-02-22 | Stop reason: HOSPADM

## 2020-02-19 RX ORDER — ATORVASTATIN CALCIUM 20 MG/1
40 TABLET, FILM COATED ORAL NIGHTLY
Status: DISCONTINUED | OUTPATIENT
Start: 2020-02-19 | End: 2020-02-22 | Stop reason: HOSPADM

## 2020-02-19 RX ORDER — IPRATROPIUM BROMIDE AND ALBUTEROL SULFATE 2.5; .5 MG/3ML; MG/3ML
1 SOLUTION RESPIRATORY (INHALATION)
Status: DISCONTINUED | OUTPATIENT
Start: 2020-02-19 | End: 2020-02-22 | Stop reason: HOSPADM

## 2020-02-19 RX ORDER — POLYETHYLENE GLYCOL 3350 17 G/17G
17 POWDER, FOR SOLUTION ORAL DAILY PRN
Status: DISCONTINUED | OUTPATIENT
Start: 2020-02-19 | End: 2020-02-22 | Stop reason: HOSPADM

## 2020-02-19 RX ORDER — INSULIN GLARGINE 100 [IU]/ML
10 INJECTION, SOLUTION SUBCUTANEOUS 2 TIMES DAILY
Status: DISCONTINUED | OUTPATIENT
Start: 2020-02-19 | End: 2020-02-22 | Stop reason: HOSPADM

## 2020-02-19 RX ORDER — IPRATROPIUM BROMIDE AND ALBUTEROL SULFATE 2.5; .5 MG/3ML; MG/3ML
1 SOLUTION RESPIRATORY (INHALATION) ONCE
Status: COMPLETED | OUTPATIENT
Start: 2020-02-19 | End: 2020-02-19

## 2020-02-19 RX ORDER — AMLODIPINE BESYLATE 5 MG/1
5 TABLET ORAL DAILY
Status: DISCONTINUED | OUTPATIENT
Start: 2020-02-19 | End: 2020-02-22 | Stop reason: HOSPADM

## 2020-02-19 RX ORDER — ACETAMINOPHEN 500 MG
1000 TABLET ORAL 3 TIMES DAILY
COMMUNITY

## 2020-02-19 RX ORDER — SENNA PLUS 8.6 MG/1
2 TABLET ORAL 2 TIMES DAILY
COMMUNITY

## 2020-02-19 RX ORDER — WARFARIN SODIUM 5 MG/1
5 TABLET ORAL
Status: COMPLETED | OUTPATIENT
Start: 2020-02-19 | End: 2020-02-19

## 2020-02-19 RX ORDER — FUROSEMIDE 10 MG/ML
20 INJECTION INTRAMUSCULAR; INTRAVENOUS 2 TIMES DAILY
Status: DISCONTINUED | OUTPATIENT
Start: 2020-02-19 | End: 2020-02-20

## 2020-02-19 RX ADMIN — SENNOSIDES 17.2 MG: 8.6 TABLET, FILM COATED ORAL at 22:17

## 2020-02-19 RX ADMIN — AMLODIPINE BESYLATE 5 MG: 5 TABLET ORAL at 12:54

## 2020-02-19 RX ADMIN — SENNOSIDES 17.2 MG: 8.6 TABLET, FILM COATED ORAL at 12:54

## 2020-02-19 RX ADMIN — INSULIN LISPRO 1 UNITS: 100 INJECTION, SOLUTION INTRAVENOUS; SUBCUTANEOUS at 22:23

## 2020-02-19 RX ADMIN — LISINOPRIL 5 MG: 5 TABLET ORAL at 12:54

## 2020-02-19 RX ADMIN — CEFTRIAXONE SODIUM 1 G: 1 INJECTION, POWDER, FOR SOLUTION INTRAMUSCULAR; INTRAVENOUS at 10:36

## 2020-02-19 RX ADMIN — HYDRALAZINE HYDROCHLORIDE 50 MG: 25 TABLET, FILM COATED ORAL at 14:47

## 2020-02-19 RX ADMIN — CITALOPRAM HYDROBROMIDE 10 MG: 20 TABLET ORAL at 22:16

## 2020-02-19 RX ADMIN — Medication 10 ML: at 22:20

## 2020-02-19 RX ADMIN — ACETAMINOPHEN 1000 MG: 500 TABLET, FILM COATED ORAL at 18:18

## 2020-02-19 RX ADMIN — IPRATROPIUM BROMIDE AND ALBUTEROL SULFATE 1 AMPULE: .5; 3 SOLUTION RESPIRATORY (INHALATION) at 09:18

## 2020-02-19 RX ADMIN — ATORVASTATIN CALCIUM 40 MG: 20 TABLET, FILM COATED ORAL at 22:16

## 2020-02-19 RX ADMIN — IPRATROPIUM BROMIDE AND ALBUTEROL SULFATE 1 AMPULE: .5; 3 SOLUTION RESPIRATORY (INHALATION) at 16:30

## 2020-02-19 RX ADMIN — IPRATROPIUM BROMIDE AND ALBUTEROL SULFATE 1 AMPULE: .5; 3 SOLUTION RESPIRATORY (INHALATION) at 20:57

## 2020-02-19 RX ADMIN — FUROSEMIDE 20 MG: 10 INJECTION, SOLUTION INTRAMUSCULAR; INTRAVENOUS at 18:17

## 2020-02-19 RX ADMIN — METOPROLOL TARTRATE 25 MG: 25 TABLET ORAL at 12:54

## 2020-02-19 RX ADMIN — CITALOPRAM HYDROBROMIDE 10 MG: 20 TABLET ORAL at 12:54

## 2020-02-19 RX ADMIN — Medication 10 UNITS: at 12:54

## 2020-02-19 RX ADMIN — WARFARIN SODIUM 5 MG: 5 TABLET ORAL at 18:17

## 2020-02-19 RX ADMIN — Medication 10 UNITS: at 22:23

## 2020-02-19 RX ADMIN — FUROSEMIDE 20 MG: 10 INJECTION, SOLUTION INTRAMUSCULAR; INTRAVENOUS at 12:54

## 2020-02-19 ASSESSMENT — PAIN SCALES - GENERAL
PAINLEVEL_OUTOF10: 0

## 2020-02-19 NOTE — PLAN OF CARE
Problem: Falls - Risk of:  Goal: Will remain free from falls  Description  Will remain free from falls  Outcome: Met This Shift  Goal: Absence of physical injury  Description  Absence of physical injury  Outcome: Met This Shift     Problem: Risk for Impaired Skin Integrity  Goal: Tissue integrity - skin and mucous membranes  Description  Structural intactness and normal physiological function of skin and  mucous membranes.   Outcome: Met This Shift     Problem: Cardiac:  Goal: Ability to maintain vital signs within normal range will improve  Description  Ability to maintain vital signs within normal range will improve  Outcome: Met This Shift  Goal: Cardiovascular alteration will improve  Description  Cardiovascular alteration will improve  Outcome: Met This Shift     Problem: Health Behavior:  Goal: Will modify at least one risk factor affecting health status  Description  Will modify at least one risk factor affecting health status  Outcome: Met This Shift  Goal: Identification of resources available to assist in meeting health care needs will improve  Description  Identification of resources available to assist in meeting health care needs will improve  Outcome: Met This Shift

## 2020-02-19 NOTE — PROGRESS NOTES
PALLIATIVE CARE  Donis is admitted today to the hospital with CHF as his diagnosis. He has a history of a stroke in the past. His code status is [facility]. No advance directives on file in his electronic record or paper chart. Was made a [facility] today in the ED. Pt is alert. He has difficulty speaking. Can say yes or no, but uncertain how accurate the responses are. While asking pt questions about care at home, nurse comes to pt doorway telling me that what he is saying is not correct. Per primary nurse, pt has home care that helps with his daily care. He does not have a lift as he told me, but the home care nurse transfers him. Pt denies any difficulty breathing. Pt denies needs at present time. Encouragement given. Wife is not present. Nursing staff uncertain when she will return. Will attempt to talk with wife if she returns while Ta Pedlar is present regarding chronic disease support and goals of care. Tanja Reyes RN, [facility] [employer] Nurse Coordinator  2/19/2020 2:16 PM

## 2020-02-19 NOTE — ED PROVIDER NOTES
EMERGENCY DEPARTMENT ENCOUNTER      CHIEF COMPLAINT    Chief Complaint   Patient presents with    Shortness of Breath     increased congestion for few days, wife reports pt struggling to breathe this am, squad reports pulse ox 88% on RA upon arrival        HPI    Orestes Melendrez is a 80 y.o. male who presentsto ED from home. By EMS. With complaint of shortness of breath. Patient has had chest congestion and difficulty breathing for several days. Patient's wife called the EMS. Patient sat was 88% on room air at home  Onset several days ago. Intensity of symptoms progressively getting worse. Location of symptoms chest.  Patient has history of stroke with right-sided weakness.       PAST MEDICAL HISTORY    Past Medical History:   Diagnosis Date    Cerebrovascular disease     Diverticula of colon 6/23/10    scattered, mid transverse colon    H/O ischemic left MCA stroke 12/2016    Hyperlipidemia     Hypertension     Polyp of colon 1999    mucosal    Tonsil, abscess        SURGICAL HISTORY    Past Surgical History:   Procedure Laterality Date    COLONOSCOPY  6/23/10    mucosal polyp 1999, diverticula    CYSTOURETHROSCOPY      per Dr. Gómez Wallace, 2012    GASTROSTOMY TUBE PLACEMENT  12/21/2016    HERNIA REPAIR         CURRENT MEDICATIONS    Current Outpatient Rx   Medication Sig Dispense Refill    senna (SENOKOT) 8.6 MG tablet Take 2 tablets by mouth 2 times daily      hydrALAZINE (APRESOLINE) 50 MG tablet Take 50 mg by mouth 3 times daily      acetaminophen (TYLENOL) 500 MG tablet Take 1,000 mg by mouth 3 times daily      furosemide (LASIX) 20 MG tablet Take 20 mg by mouth daily      citalopram (CELEXA) 10 MG tablet Take 10 mg by mouth 2 times daily       insulin glargine (LANTUS) 100 UNIT/ML injection vial Inject into the skin See Admin Instructions Take 10 units sub q in AM  Take 10 units sub q in PM      warfarin (COUMADIN) 5 MG tablet Take 5 mg by mouth See Admin Instructions 2.5MG  Monday Wednesday and Friday, 5mg all other days      atorvastatin (LIPITOR) 40 MG tablet Take 1 tablet by mouth nightly 30 tablet 3    metoprolol tartrate (LOPRESSOR) 50 MG tablet Take 1 tablet by mouth 2 times daily (Patient taking differently: Take 25 mg by mouth 2 times daily ) 60 tablet 3    amLODIPine (NORVASC) 5 MG tablet Take 1 tablet by mouth daily 30 tablet 3    ipratropium-albuterol (DUONEB) 0.5-2.5 (3) MG/3ML SOLN nebulizer solution Inhale 3 mLs into the lungs every 6 hours as needed for Shortness of Breath 360 mL 3       ALLERGIES    No Known Allergies    FAMILY HISTORY    History reviewed. No pertinent family history.     SOCIAL HISTORY    Social History     Socioeconomic History    Marital status:      Spouse name: None    Number of children: None    Years of education: None    Highest education level: None   Occupational History    None   Social Needs    Financial resource strain: None    Food insecurity:     Worry: None     Inability: None    Transportation needs:     Medical: None     Non-medical: None   Tobacco Use    Smoking status: Never Smoker    Smokeless tobacco: Never Used   Substance and Sexual Activity    Alcohol use: No    Drug use: No    Sexual activity: None   Lifestyle    Physical activity:     Days per week: None     Minutes per session: None    Stress: None   Relationships    Social connections:     Talks on phone: None     Gets together: None     Attends Holiness service: None     Active member of club or organization: None     Attends meetings of clubs or organizations: None     Relationship status: None    Intimate partner violence:     Fear of current or ex partner: None     Emotionally abused: None     Physically abused: None     Forced sexual activity: None   Other Topics Concern    None   Social History Narrative    None           Review of Systems:  Constitutional: Ill-appearing male with shortness of breath eyes:  Denies photophobia or discharge   HENT: Denies sore throat or ear pain   Respiratory: Productive cough with shortness of breath  Cardiovascular:  Denies chest pain, palpitations or swelling   GI:  Denies abdominal pain, nausea, vomiting, or diarrhea   Musculoskeletal:  Denies back pain   Skin:  Denies rash   Neurologic:  Denies headache, focal weakness or sensory changes   Endocrine:  Denies polyuria or polydypsia   Lymphatic:  Denies swollen glands   Psychiatric:  Denies depression, suicidal ideation or homicidal ideation   All systems negative except as marked. PHYSICAL EXAM    VITAL SIGNS: BP (!) 144/56   Pulse 67   Temp 98.8 °F (37.1 °C) (Oral)   Resp 21   Wt 193 lb 8 oz (87.8 kg)   SpO2 96%   BMI 26.24 kg/m²    Constitutional: Elderly male with shortness of breath arrival  HENT:  Normocephalic, Atraumatic, Bilateral external ears normal, Oropharynx moist, No oral exudates, Nose normal. Neck- Normal range of motion, No tenderness, Supple, No stridor. Eyes:  PERRL, EOMI, Conjunctiva normal, No discharge. Respiratory: Bilateral rhonchi   cardiovascular:  Normal heart rate, Normal rhythm, No murmurs, No rubs, No gallops. GI:  Bowel sounds normal, Soft, No tenderness, No masses, No pulsatile masses. : External genitalia appear normal, No masses or lesions. No discharge. No CVA tenderness. Musculoskeletal:  Intact distal pulses, No edema, No tenderness, No cyanosis, No clubbing. Good range of motion in all major joints. No tenderness to palpation or major deformities noted. Back- No tenderness. Integument:  Warm, Dry, No erythema, No rash. Lymphatic:  No lymphadenopathy noted. Neurologic: Alert and oriented x3.   Patient has previous history of stroke with right-sided deficit  Psychiatric:  Affect normal, Judgment normal, Mood normal.     EKG Regular rhythm rate is 67 no acute ST change    RADIOLOGY    XR CHEST PORTABLE   Final Result      Evidence of CHF and moderate edema                 PROCEDURES        Labs  Labs Reviewed CBC WITH AUTO DIFFERENTIAL - Abnormal; Notable for the following components:       Result Value    RBC 3.88 (*)     Hemoglobin 12.1 (*)     Hematocrit 36.3 (*)     Segs Absolute 7.30 (*)     All other components within normal limits   BRAIN NATRIURETIC PEPTIDE - Abnormal; Notable for the following components:    Pro-BNP 1,907 (*)     All other components within normal limits   BASIC METABOLIC PANEL - Abnormal; Notable for the following components:    Glucose 110 (*)     BUN 26 (*)     CREATININE 1.62 (*)     GFR Non- 41 (*)     GFR  49 (*)     All other components within normal limits   TROPONIN - Abnormal; Notable for the following components:    Troponin T 0.04 (*)     All other components within normal limits   URINALYSIS - Abnormal; Notable for the following components:    Turbidity UA HAZY (*)     Urine Hgb TRACE (*)     Protein, UA 2+ (*)     Nitrite, Urine POSITIVE (*)     Leukocyte Esterase, Urine 1+ (*)     All other components within normal limits   MICROSCOPIC URINALYSIS - Abnormal; Notable for the following components:    Bacteria, UA 4+ (*)     All other components within normal limits   CULTURE, URINE   PROTIME-INR             Summation      Patient Course: Patient is given DuoNeb nebulized treatment in ED. Patient is given Rocephin 1 g IV. I discussed the patient's condition and labs with the family. Patient is DNR CC . Patient is discussed with Dr. Radha Merlos. Patient will be admitted for further evaluation and treatment. Patient  feels better prior to admission. ED Medications administered this visit:    Medications   cefTRIAXone (ROCEPHIN) 1 g in sterile water 10 mL IV syringe (has no administration in time range)   ipratropium-albuterol (DUONEB) nebulizer solution 1 ampule (1 ampule Inhalation Given 2/19/20 0918)       New Prescriptions from this visit:    New Prescriptions    No medications on file       Follow-up:  No follow-up provider specified. Final Impression:   1. Shortness of breath    2. Acute bronchitis, unspecified organism    3. Urinary tract infection without hematuria, site unspecified    4.  Acute on chronic congestive heart failure, unspecified heart failure type [facility]               (Please note that portions of this note were completed with a voice recognition program.  Efforts were made to edit the dictations but occasionally words are mis-transcribed.)        Jared Galvan MD  02/19/20 8380

## 2020-02-20 ENCOUNTER — APPOINTMENT (OUTPATIENT)
Dept: GENERAL RADIOLOGY | Age: 85
DRG: 194 | End: 2020-02-20
Payer: MEDICARE

## 2020-02-20 ENCOUNTER — APPOINTMENT (OUTPATIENT)
Dept: NON INVASIVE DIAGNOSTICS | Age: 85
DRG: 194 | End: 2020-02-20
Payer: MEDICARE

## 2020-02-20 LAB
ANION GAP SERPL CALCULATED.3IONS-SCNC: 15 MMOL/L (ref 9–17)
BUN BLDV-MCNC: 27 MG/DL (ref 8–23)
BUN/CREAT BLD: 16 (ref 9–20)
CALCIUM SERPL-MCNC: 9.2 MG/DL (ref 8.6–10.4)
CHLORIDE BLD-SCNC: 104 MMOL/L (ref 98–107)
CHOLESTEROL/HDL RATIO: 3
CHOLESTEROL: 108 MG/DL
CO2: 23 MMOL/L (ref 20–31)
CREAT SERPL-MCNC: 1.64 MG/DL (ref 0.7–1.2)
CULTURE: ABNORMAL
GFR AFRICAN AMERICAN: 49 ML/MIN
GFR NON-AFRICAN AMERICAN: 40 ML/MIN
GFR SERPL CREATININE-BSD FRML MDRD: ABNORMAL ML/MIN/{1.73_M2}
GFR SERPL CREATININE-BSD FRML MDRD: ABNORMAL ML/MIN/{1.73_M2}
GLUCOSE BLD-MCNC: 100 MG/DL (ref 65–99)
GLUCOSE BLD-MCNC: 146 MG/DL (ref 65–99)
GLUCOSE BLD-MCNC: 80 MG/DL (ref 65–99)
GLUCOSE BLD-MCNC: 80 MG/DL (ref 70–99)
GLUCOSE BLD-MCNC: 87 MG/DL (ref 65–99)
HDLC SERPL-MCNC: 36 MG/DL
INR BLD: 1.9
LDL CHOLESTEROL: 50 MG/DL (ref 0–130)
LV EF: 55 %
LVEF MODALITY: NORMAL
Lab: ABNORMAL
MAGNESIUM: 2.3 MG/DL (ref 1.6–2.6)
POTASSIUM SERPL-SCNC: 3.8 MMOL/L (ref 3.7–5.3)
PROTHROMBIN TIME: 18.6 SEC (ref 9–11.6)
SODIUM BLD-SCNC: 142 MMOL/L (ref 135–144)
SPECIMEN DESCRIPTION: ABNORMAL
TRIGL SERPL-MCNC: 112 MG/DL
VLDLC SERPL CALC-MCNC: ABNORMAL MG/DL (ref 1–30)

## 2020-02-20 PROCEDURE — 36415 COLL VENOUS BLD VENIPUNCTURE: CPT

## 2020-02-20 PROCEDURE — 94640 AIRWAY INHALATION TREATMENT: CPT

## 2020-02-20 PROCEDURE — 82947 ASSAY GLUCOSE BLOOD QUANT: CPT

## 2020-02-20 PROCEDURE — 6370000000 HC RX 637 (ALT 250 FOR IP): Performed by: INTERNAL MEDICINE

## 2020-02-20 PROCEDURE — 2700000000 HC OXYGEN THERAPY PER DAY

## 2020-02-20 PROCEDURE — 80061 LIPID PANEL: CPT

## 2020-02-20 PROCEDURE — 93306 TTE W/DOPPLER COMPLETE: CPT

## 2020-02-20 PROCEDURE — 1200000000 HC SEMI PRIVATE

## 2020-02-20 PROCEDURE — 2580000003 HC RX 258: Performed by: INTERNAL MEDICINE

## 2020-02-20 PROCEDURE — 71045 X-RAY EXAM CHEST 1 VIEW: CPT

## 2020-02-20 PROCEDURE — 94761 N-INVAS EAR/PLS OXIMETRY MLT: CPT

## 2020-02-20 PROCEDURE — 99222 1ST HOSP IP/OBS MODERATE 55: CPT | Performed by: INTERNAL MEDICINE

## 2020-02-20 PROCEDURE — 85610 PROTHROMBIN TIME: CPT

## 2020-02-20 PROCEDURE — 6360000002 HC RX W HCPCS: Performed by: INTERNAL MEDICINE

## 2020-02-20 PROCEDURE — 80048 BASIC METABOLIC PNL TOTAL CA: CPT

## 2020-02-20 PROCEDURE — 83735 ASSAY OF MAGNESIUM: CPT

## 2020-02-20 RX ORDER — LEVOFLOXACIN 5 MG/ML
500 INJECTION, SOLUTION INTRAVENOUS ONCE
Status: COMPLETED | OUTPATIENT
Start: 2020-02-20 | End: 2020-02-20

## 2020-02-20 RX ORDER — FUROSEMIDE 10 MG/ML
20 INJECTION INTRAMUSCULAR; INTRAVENOUS DAILY
Status: DISCONTINUED | OUTPATIENT
Start: 2020-02-20 | End: 2020-02-21

## 2020-02-20 RX ORDER — WARFARIN SODIUM 1 MG/1
4 TABLET ORAL
Status: COMPLETED | OUTPATIENT
Start: 2020-02-20 | End: 2020-02-20

## 2020-02-20 RX ORDER — LEVOFLOXACIN 5 MG/ML
250 INJECTION, SOLUTION INTRAVENOUS EVERY 24 HOURS
Status: DISCONTINUED | OUTPATIENT
Start: 2020-02-21 | End: 2020-02-22 | Stop reason: HOSPADM

## 2020-02-20 RX ADMIN — CITALOPRAM HYDROBROMIDE 10 MG: 20 TABLET ORAL at 08:28

## 2020-02-20 RX ADMIN — ATORVASTATIN CALCIUM 40 MG: 20 TABLET, FILM COATED ORAL at 21:51

## 2020-02-20 RX ADMIN — CITALOPRAM HYDROBROMIDE 10 MG: 20 TABLET ORAL at 21:50

## 2020-02-20 RX ADMIN — HYDRALAZINE HYDROCHLORIDE 50 MG: 25 TABLET, FILM COATED ORAL at 17:27

## 2020-02-20 RX ADMIN — ACETAMINOPHEN 1000 MG: 500 TABLET, FILM COATED ORAL at 08:28

## 2020-02-20 RX ADMIN — FUROSEMIDE 20 MG: 10 INJECTION, SOLUTION INTRAMUSCULAR; INTRAVENOUS at 08:29

## 2020-02-20 RX ADMIN — LEVOFLOXACIN 500 MG: 5 INJECTION, SOLUTION INTRAVENOUS at 18:45

## 2020-02-20 RX ADMIN — METOPROLOL TARTRATE 25 MG: 25 TABLET ORAL at 21:49

## 2020-02-20 RX ADMIN — IPRATROPIUM BROMIDE AND ALBUTEROL SULFATE 1 AMPULE: .5; 3 SOLUTION RESPIRATORY (INHALATION) at 00:57

## 2020-02-20 RX ADMIN — IPRATROPIUM BROMIDE AND ALBUTEROL SULFATE 1 AMPULE: .5; 3 SOLUTION RESPIRATORY (INHALATION) at 08:59

## 2020-02-20 RX ADMIN — WARFARIN SODIUM 4 MG: 1 TABLET ORAL at 17:27

## 2020-02-20 RX ADMIN — Medication 10 UNITS: at 08:29

## 2020-02-20 RX ADMIN — Medication 10 UNITS: at 21:53

## 2020-02-20 RX ADMIN — CEFTRIAXONE 1 G: 1 INJECTION, POWDER, FOR SOLUTION INTRAMUSCULAR; INTRAVENOUS at 13:10

## 2020-02-20 RX ADMIN — Medication 10 ML: at 08:28

## 2020-02-20 RX ADMIN — IPRATROPIUM BROMIDE AND ALBUTEROL SULFATE 1 AMPULE: .5; 3 SOLUTION RESPIRATORY (INHALATION) at 05:40

## 2020-02-20 RX ADMIN — HYDRALAZINE HYDROCHLORIDE 50 MG: 25 TABLET, FILM COATED ORAL at 08:28

## 2020-02-20 RX ADMIN — HYDRALAZINE HYDROCHLORIDE 50 MG: 25 TABLET, FILM COATED ORAL at 21:50

## 2020-02-20 RX ADMIN — IPRATROPIUM BROMIDE AND ALBUTEROL SULFATE 1 AMPULE: .5; 3 SOLUTION RESPIRATORY (INHALATION) at 16:35

## 2020-02-20 RX ADMIN — AMLODIPINE BESYLATE 5 MG: 5 TABLET ORAL at 08:28

## 2020-02-20 RX ADMIN — IPRATROPIUM BROMIDE AND ALBUTEROL SULFATE 1 AMPULE: .5; 3 SOLUTION RESPIRATORY (INHALATION) at 13:27

## 2020-02-20 RX ADMIN — INSULIN LISPRO 1 UNITS: 100 INJECTION, SOLUTION INTRAVENOUS; SUBCUTANEOUS at 21:53

## 2020-02-20 RX ADMIN — SENNOSIDES 17.2 MG: 8.6 TABLET, FILM COATED ORAL at 08:28

## 2020-02-20 RX ADMIN — IPRATROPIUM BROMIDE AND ALBUTEROL SULFATE 1 AMPULE: .5; 3 SOLUTION RESPIRATORY (INHALATION) at 20:44

## 2020-02-20 RX ADMIN — Medication 10 ML: at 21:49

## 2020-02-20 RX ADMIN — Medication 10 ML: at 13:10

## 2020-02-20 RX ADMIN — ACETAMINOPHEN 1000 MG: 500 TABLET, FILM COATED ORAL at 17:27

## 2020-02-20 RX ADMIN — LISINOPRIL 5 MG: 5 TABLET ORAL at 08:28

## 2020-02-20 RX ADMIN — ACETAMINOPHEN 1000 MG: 500 TABLET, FILM COATED ORAL at 21:51

## 2020-02-20 RX ADMIN — METOPROLOL TARTRATE 25 MG: 25 TABLET ORAL at 08:28

## 2020-02-20 ASSESSMENT — PAIN SCALES - GENERAL
PAINLEVEL_OUTOF10: 0

## 2020-02-20 NOTE — PLAN OF CARE
Problem: Falls - Risk of:  Goal: Will remain free from falls  Description  Will remain free from falls  Outcome: Ongoing     Problem: Risk for Impaired Skin Integrity  Goal: Tissue integrity - skin and mucous membranes  Description  Structural intactness and normal physiological function of skin and  mucous membranes.   Outcome: Ongoing     Problem: COMMUNICATION IMPAIRMENT  Goal: Ability to express needs and understand communication  Outcome: Ongoing     Problem: SKIN INTEGRITY  Goal: Skin integrity is maintained or improved  Outcome: Ongoing

## 2020-02-20 NOTE — PROGRESS NOTES
Pharmacy Note     Renal Dose Adjustment    Donis Simpson is a 80 y.o. male. Pharmacist assessment of renally cleared medications. Recent Labs     02/19/20  0915 02/20/20  0557   BUN 26* 27*       Recent Labs     02/19/20  0915 02/20/20  0557   CREATININE 1.62* 1.64*       Estimated Creatinine Clearance: 35 mL/min (A) (based on SCr of 1.64 mg/dL (H)). Height:   Ht Readings from Last 1 Encounters:   02/19/20 5' 8\" (1.727 m)     Weight:  Wt Readings from Last 1 Encounters:   02/19/20 186 lb 11.2 oz (84.7 kg)       The following medication dose has been adjusted based upon renal function per P&T Guidelines:  Levaquin  Normal renal function dosing of 500 mg daily:  CrCl 20 to 49 mL/minute: Administer 500 mg initial dose, followed by 250 mg every 24 hours.              Jag Phelps Pharm D.  2/20/2020  6:12 PM

## 2020-02-20 NOTE — PROGRESS NOTES
Met with Patient and his daughter during quality flow rounding this a.m. Met also with Patient's wife this p.m. to discuss discharge planning. Patient is an 80year old , white male, admitted with a new diagnosis of CHF. Patient is aphasic due to Hx of stroke. He is alert, attempting to respond to questions being asked of him. Daughter and wife are participating in discharge planning conversation as appropriate. [place] [facility] [place] his wife. This is a second marriage for both and they have been  since 2012. Patient is a . He uses a hospital bed, wheelchair, shower chair, elevated toilet seat, grab bars and a walker for assistance at home. Patient has 'Core' service through [facility] where Galina Lauren sends a nurse and a  weekly to monitor his care but they do not bill for their services. Patient also receives private duty aide services and wife has 4 individuals that she rotates to assist with Patient's care. Wife is able to provide transportation to appointments but needs aide to help with transfers into and out of the car. PCP is Dr. Torito Lua. Wife denies having any difficulty with affording Patient's medications. Discharge plan is home with wife when medically stable. Hospice LSW informed of Patient's hospitalization, per this writer. She will let hospice RN know. Patient has a '[facility]' order in place. Patient also has Living Will on file. Wife requesting referral to Meals on Wheels. Referral made at this time. No further discharge needs noted. LSW to monitor for further needs and assist as they arise.     Elena. [facility]  2/20/2020

## 2020-02-20 NOTE — CONSULTS
[place] [id number]                                  CONSULTATION    PATIENT NAME: Brittany Marina                   :        1934  MED REC NO:   [id number]                              ROOM:       1169  ACCOUNT NO:   [de-identified]                           ADMIT DATE: 2020  PROVIDER:     Jean Rocha    CONSULT DATE:  2020    REASON FOR CONSULT:  Possible CHF. HISTORY OF PRESENT ILLNESS:  The patient is a very pleasant 80-year-old  gentleman who I saw in 2017. On 2016, came to the emergency room with right-sided weakness. He  had a right facial droop and his blood pressure was 210/85. He was sent to [facility]. [facility] and a CTA of his head showed occlusive  filling defect. He was taken for a mechanical thrombectomy and a large  clot was removed from the left middle cerebral artery. He did go into  respiratory failure and was intubated and started on Zosyn for possible  aspiration. On 2016, he developed new onset of atrial fibrillation with rate  control. He was started on Coumadin, which he has remained. He has Broca's aphasia but can answer yes and now questions. He is  otherwise limited in his speech. He has never had a myocardial infarction, never had a cardiac  catheterization and I have not seen him since 2017. An  echocardiogram on 2016, showed normal LV function with an ejection  fraction of 55%, with mild aortic stenosis with a mean gradient of 60  mmHg and a peak gradient of 31 mmHg. He had mild mitral regurgitation  at that time. Nobody is with him today. However, using yes and no questions, it  appears that he began short of breath approximately 2 days ago. He  denies any chest pain or chest discomfort. He denies any ascites or  pedal edema. He may have had PND last night, sleeping in a chair.     Because of his worsening shortness of breath, he came to the emergency  room. A chest x-ray was done that appears to be CHF. His wife reported  in the emergency room that he had had increasing congestion. He was  admitted and placed on antibiotics for possible infectious etiology and  I was asked to see him for possible CHF. He is in no distress and is lying flat as I see him this morning. Again, he denies any chest pain or chest discomfort. CARDIAC RISK FACTORS:  Prior MI:  Negative. Smoking:  Positive. Other Family Members:  Negative. Peripheral Vascular disease:  Negative. Hyperlipidemia:  Positive. Diabetes:  Positive. Status Post CVA:  Positive. MEDICATIONS PRIOR TO ADMISSION:  He was on hydralazine 50 mg t.i.d.,  Lasix 20 mg daily, Celexa 10 mg b.i.d., glargine insulin, Coumadin as  directed, Lipitor 40 mg daily, Lopressor 50 mg b.i.d., Norvasc 5 mg  daily, DuoNeb every 6 hours. PAST MEDICAL HISTORY:  1.  Polyp in the colon removed in .  2.  History of hypertension. 3.  Hyperlipidemia. 4.  CVA on 2016, with clot in his middle cerebral artery, which  was removed at [facility]. [facility] in [place]. 5.  Atrial fibrillation on 2016, at [facility]. [facility] and  he has been treated for, what was assumed to be, paroxysmal atrial  fibrillation, on Coumadin since that time. 6.  Status post hernia repair. 7.  Cystourethroscopy in , by Dr. Goldie Flanagan. FAMILY HISTORY:  Mother  at 80. Father  of CVA. SOCIAL HISTORY:  He is . Has 2 daughters, Patsy Daly and Mateo Rivera. He has a stepson, named Elli.  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 He states he lives at home with his wife. REVIEW OF SYSTEMS:  Cardiac as above.   Other systems reviewed including  constitutional, eyes, ears, nose and throat, cardiovascular,  respiratory, GI, , musculoskeletal, integumentary, neurologic,  psychiatric, endocrine, hematologic and allergic/immunologic, it has changed, which would change our approach. He does have mild renal insufficiency but this is about at baseline. This will be watched carefully. I would not aggressively diurese, as again clinically he does not appear  to be in significant CHF. Thank you very much for allowing me the privilege of seeing the patient. If you have any questions on my thoughts, please do not hesitate to  contact me.         Justine Campos    D: 02/20/2020 6:37:30       T: 02/20/2020 8:19:45     [email or web address]  Job#: [id number]     Doc#: [id number]    CC:  Rosina Foster

## 2020-02-20 NOTE — PLAN OF CARE
Problem: HEMODYNAMIC STATUS  Goal: Patient has stable vital signs and fluid balance  Outcome: Ongoing     Problem: ACTIVITY INTOLERANCE/IMPAIRED MOBILITY  Goal: Mobility/activity is maintained at optimum level for patient  Outcome: Ongoing     Problem: FLUID AND ELECTROLYTE IMBALANCE  Goal: Fluid and electrolyte balance are achieved/maintained  Outcome: Ongoing

## 2020-02-20 NOTE — PROGRESS NOTES
Cardiology    1. SOB for 2 days  2. No cardiac history  3. Loud murmur of MR  4. Hx of mild AS in 2016  5. S/P CVA 2016 with R sided weakness and Broca's aphasia, able to answer yes and no questions. 6.  Mild renal insufficiency about at baseline. 7.  Hx of PAF on coumadin   8. IDDM well controlled    He gives limited history but states he has been sob for 2 days. Denies cp. No ankle edema or ascites. CXR does appear to be chf, although out of proportion to exam.    Will review echo when done to exam LV function and valve status. Murmur quite loud for MR, although history of mild AS. Agree with antibiotics for possible infective process. Mild diuresis until cardiac status is defined. Does not appear clinically to be in CHF.     Thanks, Bowen Zee MD

## 2020-02-20 NOTE — H&P
mitral regurgitation, mild aortic stenosis. Past Medical History:        Diagnosis Date    Cerebrovascular disease     Diverticula of colon 6/23/10    scattered, mid transverse colon    H/O ischemic left MCA stroke 12/2016    Hyperlipidemia     Hypertension     Polyp of colon 1999    mucosal    Tonsil, abscess        Past Surgical History:        Procedure Laterality Date    COLONOSCOPY  6/23/10    mucosal polyp 1999, diverticula    CYSTOURETHROSCOPY      per Dr. Jen Brunner, 2012    GASTROSTOMY TUBE PLACEMENT  12/21/2016    HERNIA REPAIR         Home Medications:   No current facility-administered medications on file prior to encounter.       Current Outpatient Medications on File Prior to Encounter   Medication Sig Dispense Refill    senna (SENOKOT) 8.6 MG tablet Take 2 tablets by mouth 2 times daily      hydrALAZINE (APRESOLINE) 50 MG tablet Take 50 mg by mouth 3 times daily      acetaminophen (TYLENOL) 500 MG tablet Take 1,000 mg by mouth 3 times daily      furosemide (LASIX) 20 MG tablet Take 20 mg by mouth daily      citalopram (CELEXA) 10 MG tablet Take 10 mg by mouth 2 times daily       insulin glargine (LANTUS) 100 UNIT/ML injection vial Inject into the skin See Admin Instructions Take 10 units sub q in AM  Take 10 units sub q in PM      warfarin (COUMADIN) 5 MG tablet Take 5 mg by mouth See Admin Instructions 2.5MG  Monday Wednesday and Friday, 5mg all other days      atorvastatin (LIPITOR) 40 MG tablet Take 1 tablet by mouth nightly 30 tablet 3    metoprolol tartrate (LOPRESSOR) 50 MG tablet Take 1 tablet by mouth 2 times daily (Patient taking differently: Take 25 mg by mouth 2 times daily ) 60 tablet 3    amLODIPine (NORVASC) 5 MG tablet Take 1 tablet by mouth daily 30 tablet 3    ipratropium-albuterol (DUONEB) 0.5-2.5 (3) MG/3ML SOLN nebulizer solution Inhale 3 mLs into the lungs every 6 hours as needed for Shortness of Breath 360 mL 3       Allergies:  Patient has no known - 144 mmol/L    Potassium 4.4 3.7 - 5.3 mmol/L    Chloride 102 98 - 107 mmol/L    CO2 23 20 - 31 mmol/L    Anion Gap 13 9 - 17 mmol/L    GFR Non-African American 41 (L) >60 mL/min    GFR  49 (L) >60 mL/min    GFR Comment          GFR Staging NOT REPORTED    Troponin    Collection Time: 02/19/20  9:15 AM   Result Value Ref Range    Troponin, High Sensitivity NOT REPORTED 0 - 22 ng/L    Troponin T 0.04 (H) <0.03 ng/mL    Troponin Interp         Protime-INR    Collection Time: 02/19/20  9:15 AM   Result Value Ref Range    Protime 17.2 (H) 9.0 - 11.6 sec    INR 1.7    Urinalysis    Collection Time: 02/19/20  9:50 AM   Result Value Ref Range    Color, UA YELLOW YELLOW    Turbidity UA HAZY (A) CLEAR    Glucose, Ur NEGATIVE NEGATIVE    Bilirubin Urine NEGATIVE NEGATIVE    Ketones, Urine NEGATIVE NEGATIVE    Specific Gravity, UA 1.015 1.005 - 1.030    Urine Hgb TRACE (A) NEGATIVE    pH, UA 6.0 5.0 - 8.0    Protein, UA 2+ (A) NEGATIVE    Urobilinogen, Urine Normal Normal    Nitrite, Urine POSITIVE (A) NEGATIVE    Leukocyte Esterase, Urine 1+ (A) NEGATIVE    Urinalysis Comments         Microscopic Urinalysis    Collection Time: 02/19/20  9:50 AM   Result Value Ref Range    -          WBC, UA 20 TO 50 0 /HPF    RBC, UA 2 TO 5 0 - 2 /HPF    Casts UA 0 TO 2 HYALINE /LPF    Crystals UA NOT REPORTED None /HPF    Epithelial Cells UA 0 TO 2 /HPF    Renal Epithelial, Urine NOT REPORTED 0 /HPF    Bacteria, UA 4+ (A) None    Mucus, UA NOT REPORTED None    Trichomonas, UA NOT REPORTED None    Amorphous, UA NOT REPORTED None    Other Observations UA NOT REPORTED NOT REQ.     Yeast, UA NOT REPORTED None   Troponin    Collection Time: 02/19/20 12:10 PM   Result Value Ref Range    Troponin, High Sensitivity NOT REPORTED 0 - 22 ng/L    Troponin T 0.03 (H) <0.03 ng/mL    Troponin Interp         TSH with Reflex    Collection Time: 02/19/20 12:10 PM   Result Value Ref Range    TSH 1.58 0.30 - 5.00 mIU/L   Hemoglobin A1c

## 2020-02-20 NOTE — CARE COORDINATION
Quality flow rounds held on 2/20/20     Armando Dent is admitted for  CHF (congestive heart failure) ([facility] [address] Length of stay 1. Education:    Needed Education: CHF packet, meds, follow up,diet      Do you have any questions regarding your plan of care while at the hospital? denies    Planned Disposition:               [x]  Home when able                [] Swing Bed                [] ECF/SNF               [] Other/TBD    Barriers to Discharge:    Can you afford your medications? yes   Do you have transportation to follow up appointments? Has transport agency that provides. Do you need any new equipment at home? none   Current equipment includes   hospital bed, wheelchair, shower chair, elevated toilet seat, grab bars, private duty aids, walker. Had hospice but not currently. Do you have a living will or durable power of  for healthcare? yes               If yes do we have a copy on file? No, asked pts daughter for a copy. Do you or your family have any questions or concerns we haven't already discussed? denies     Marvel Spain and writer present for rounding. Lives with wife, has private duty aids in home for several hours daily. pts daughter is present during rounding but requests staff speak with Kari-pts wife regarding care for discharge.  LSW offered home care at time of discharge, daughter again requests staff speak with wife, daughter states she feels home care would be beneficial.

## 2020-02-21 ENCOUNTER — APPOINTMENT (OUTPATIENT)
Dept: CT IMAGING | Age: 85
DRG: 194 | End: 2020-02-21
Payer: MEDICARE

## 2020-02-21 LAB
ANION GAP SERPL CALCULATED.3IONS-SCNC: 13 MMOL/L (ref 9–17)
BUN BLDV-MCNC: 24 MG/DL (ref 8–23)
BUN/CREAT BLD: 15 (ref 9–20)
CALCIUM SERPL-MCNC: 9.3 MG/DL (ref 8.6–10.4)
CHLORIDE BLD-SCNC: 103 MMOL/L (ref 98–107)
CO2: 23 MMOL/L (ref 20–31)
CREAT SERPL-MCNC: 1.63 MG/DL (ref 0.7–1.2)
GFR AFRICAN AMERICAN: 49 ML/MIN
GFR NON-AFRICAN AMERICAN: 40 ML/MIN
GFR SERPL CREATININE-BSD FRML MDRD: ABNORMAL ML/MIN/{1.73_M2}
GFR SERPL CREATININE-BSD FRML MDRD: ABNORMAL ML/MIN/{1.73_M2}
GLUCOSE BLD-MCNC: 124 MG/DL (ref 65–99)
GLUCOSE BLD-MCNC: 142 MG/DL (ref 65–99)
GLUCOSE BLD-MCNC: 77 MG/DL (ref 65–99)
GLUCOSE BLD-MCNC: 83 MG/DL (ref 70–99)
GLUCOSE BLD-MCNC: 96 MG/DL (ref 65–99)
INR BLD: 1.9
POTASSIUM SERPL-SCNC: 3.9 MMOL/L (ref 3.7–5.3)
PROTHROMBIN TIME: 18.9 SEC (ref 9–11.6)
SODIUM BLD-SCNC: 139 MMOL/L (ref 135–144)

## 2020-02-21 PROCEDURE — 1200000000 HC SEMI PRIVATE

## 2020-02-21 PROCEDURE — 2700000000 HC OXYGEN THERAPY PER DAY

## 2020-02-21 PROCEDURE — 94640 AIRWAY INHALATION TREATMENT: CPT

## 2020-02-21 PROCEDURE — 2580000003 HC RX 258: Performed by: INTERNAL MEDICINE

## 2020-02-21 PROCEDURE — 6370000000 HC RX 637 (ALT 250 FOR IP): Performed by: INTERNAL MEDICINE

## 2020-02-21 PROCEDURE — 71250 CT THORAX DX C-: CPT

## 2020-02-21 PROCEDURE — 80048 BASIC METABOLIC PNL TOTAL CA: CPT

## 2020-02-21 PROCEDURE — 82947 ASSAY GLUCOSE BLOOD QUANT: CPT

## 2020-02-21 PROCEDURE — 36415 COLL VENOUS BLD VENIPUNCTURE: CPT

## 2020-02-21 PROCEDURE — 94761 N-INVAS EAR/PLS OXIMETRY MLT: CPT

## 2020-02-21 PROCEDURE — 6360000002 HC RX W HCPCS: Performed by: INTERNAL MEDICINE

## 2020-02-21 PROCEDURE — 99231 SBSQ HOSP IP/OBS SF/LOW 25: CPT | Performed by: INTERNAL MEDICINE

## 2020-02-21 PROCEDURE — 85610 PROTHROMBIN TIME: CPT

## 2020-02-21 RX ORDER — WARFARIN SODIUM 1 MG/1
4 TABLET ORAL
Status: COMPLETED | OUTPATIENT
Start: 2020-02-21 | End: 2020-02-21

## 2020-02-21 RX ORDER — FUROSEMIDE 10 MG/ML
20 INJECTION INTRAMUSCULAR; INTRAVENOUS 2 TIMES DAILY
Status: DISCONTINUED | OUTPATIENT
Start: 2020-02-22 | End: 2020-02-22 | Stop reason: HOSPADM

## 2020-02-21 RX ORDER — LACTOBACILLUS RHAMNOSUS GG 10B CELL
1 CAPSULE ORAL
Status: DISCONTINUED | OUTPATIENT
Start: 2020-02-21 | End: 2020-02-22 | Stop reason: HOSPADM

## 2020-02-21 RX ADMIN — ACETAMINOPHEN 1000 MG: 500 TABLET, FILM COATED ORAL at 14:01

## 2020-02-21 RX ADMIN — Medication 10 UNITS: at 20:41

## 2020-02-21 RX ADMIN — Medication 10 ML: at 09:17

## 2020-02-21 RX ADMIN — IPRATROPIUM BROMIDE AND ALBUTEROL SULFATE 1 AMPULE: .5; 3 SOLUTION RESPIRATORY (INHALATION) at 09:03

## 2020-02-21 RX ADMIN — AMLODIPINE BESYLATE 5 MG: 5 TABLET ORAL at 09:16

## 2020-02-21 RX ADMIN — SENNOSIDES 17.2 MG: 8.6 TABLET, FILM COATED ORAL at 20:41

## 2020-02-21 RX ADMIN — Medication 10 ML: at 18:29

## 2020-02-21 RX ADMIN — WARFARIN SODIUM 4 MG: 1 TABLET ORAL at 18:28

## 2020-02-21 RX ADMIN — HYDRALAZINE HYDROCHLORIDE 50 MG: 25 TABLET, FILM COATED ORAL at 09:17

## 2020-02-21 RX ADMIN — CITALOPRAM HYDROBROMIDE 10 MG: 20 TABLET ORAL at 20:43

## 2020-02-21 RX ADMIN — IPRATROPIUM BROMIDE AND ALBUTEROL SULFATE 1 AMPULE: .5; 3 SOLUTION RESPIRATORY (INHALATION) at 13:35

## 2020-02-21 RX ADMIN — IPRATROPIUM BROMIDE AND ALBUTEROL SULFATE 1 AMPULE: .5; 3 SOLUTION RESPIRATORY (INHALATION) at 21:00

## 2020-02-21 RX ADMIN — CITALOPRAM HYDROBROMIDE 10 MG: 20 TABLET ORAL at 09:17

## 2020-02-21 RX ADMIN — Medication 10 UNITS: at 09:17

## 2020-02-21 RX ADMIN — Medication 10 ML: at 21:00

## 2020-02-21 RX ADMIN — IPRATROPIUM BROMIDE AND ALBUTEROL SULFATE 1 AMPULE: .5; 3 SOLUTION RESPIRATORY (INHALATION) at 05:11

## 2020-02-21 RX ADMIN — CEFTRIAXONE 1 G: 1 INJECTION, POWDER, FOR SOLUTION INTRAMUSCULAR; INTRAVENOUS at 11:25

## 2020-02-21 RX ADMIN — LISINOPRIL 5 MG: 5 TABLET ORAL at 09:22

## 2020-02-21 RX ADMIN — ACETAMINOPHEN 1000 MG: 500 TABLET, FILM COATED ORAL at 09:17

## 2020-02-21 RX ADMIN — FUROSEMIDE 20 MG: 10 INJECTION, SOLUTION INTRAMUSCULAR; INTRAVENOUS at 09:17

## 2020-02-21 RX ADMIN — ACETAMINOPHEN 1000 MG: 500 TABLET, FILM COATED ORAL at 20:42

## 2020-02-21 RX ADMIN — SENNOSIDES 17.2 MG: 8.6 TABLET, FILM COATED ORAL at 09:17

## 2020-02-21 RX ADMIN — Medication 1 CAPSULE: at 09:16

## 2020-02-21 RX ADMIN — METOPROLOL TARTRATE 25 MG: 25 TABLET ORAL at 09:17

## 2020-02-21 RX ADMIN — ATORVASTATIN CALCIUM 40 MG: 20 TABLET, FILM COATED ORAL at 20:42

## 2020-02-21 RX ADMIN — LEVOFLOXACIN 250 MG: 5 INJECTION, SOLUTION INTRAVENOUS at 18:29

## 2020-02-21 ASSESSMENT — PAIN SCALES - GENERAL
PAINLEVEL_OUTOF10: 0

## 2020-02-21 NOTE — PROGRESS NOTES
[facility]  Swing Bed Evaluation for Certification for [address] meets skilled criteria due to the need for skilled nursing supervision or skilled rehabilitation services listed below:   [] Therapy; physical and occupational; for decreased strength, balance and self         care activities. [] Tpn    [] Trach care   [x] IV therapy   [] Wound care    [] Other Skilled Need:        Kathy Oliver lives [] Alone      [x] With Spouse    [] Other:   and plans on returning there at discharge. [x] Pt declines list of alternate providers, pt prefers to receive skilled care at [facility]  [] List of alternate providers given to patient, pt prefers to receive skilled care at [facility]  [] Not applicable, pt admitted to [facility] from [facility] prefers this facility for skilled care and services can only be practically provided in a skilled nursing facility or swing bed hospital on an inpatient basis. Kathy Oliver will require skilled care on a daily basis beginning 02/22/2020, if medically stable.   These services are for an ongoing condition for which Donis Shelton received inpatient care for at the hospital.        Cheyenne Sylvester,     Date: 2/21/2020

## 2020-02-21 NOTE — PROGRESS NOTES
(ONS) Orders: None  · Anthropometric Measures:  · Ht: 5' 8\" (172.7 cm)   · Current Body Wt: 186 lb 11.2 oz (84.7 kg)  · Admission Body Wt: 193 lb 8 oz (87.8 kg)  · Usual Body Wt: (uncertain)  · % Weight Change:  ,  3.5% acute losses (diuresis)  · Ideal Body Wt: 154 lb (69.9 kg), % Ideal Body 121%  · BMI Classification: BMI 25.0 - 29.9 Overweight    Lab Results   Component Value Date     02/21/2020    K 3.9 02/21/2020     02/21/2020    CO2 23 02/21/2020    BUN 24 (H) 02/21/2020    CREATININE 1.63 (H) 02/21/2020    GLUCOSE 83 02/21/2020    CALCIUM 9.3 02/21/2020    PROT 6.6 03/25/2017    LABALBU 3.1 (L) 03/25/2017    BILITOT 0.62 03/25/2017    ALKPHOS 78 03/25/2017    AST 12 03/25/2017    ALT 8 03/25/2017    LABGLOM 40 (L) 02/21/2020    GFRAA 49 (L) 02/21/2020     Lab Results   Component Value Date    LABA1C 6.6 (H) 02/19/2020     Lab Results   Component Value Date     02/19/2020     Nutrition Interventions:   Continue current diet  Continued Inpatient Monitoring, Coordination of Care, Education Initiated    Nutrition Evaluation:   · Evaluation: Goals set   · Goals: PO>75% meals with lower sodium and softer foods    · Monitoring: Meal Intake, Pertinent Labs, Weight, I&O, Patient/Family Education, Chewing/Swallowing    Electronically signed by General Kyleigh RD, YONY on 2/21/20 at 12:11 PM    Contact Number: [id number]

## 2020-02-21 NOTE — PROGRESS NOTES
[facility] Pharmacy Department    Clinical Pharmacy Note    Warfarin consult follow-up    INR (no units)   Date Value   02/21/2020 1.9   02/20/2020 1.9   02/19/2020 1.7   06/13/2017 1.9   06/06/2017 1.6   05/30/2017 1.4   05/23/2017 1.4       Hemoglobin (g/dL)   Date Value   02/19/2020 12.1 (L)   03/25/2017 10.5 (L)   01/30/2017 10.6 (L)     Hematocrit (%)   Date Value   02/19/2020 36.3 (L)   03/25/2017 31.4 (L)   01/30/2017 31.6 (L)     Platelets (k/uL)   Date Value   02/19/2020 325   03/25/2017 408   01/30/2017 442       Target INR Range: 2.0-3.0    Significant Drug-Drug Interactions:  New warfarin drug-drug interactions: Levaquin  Discontinued drug-drug interactions: none      Notes: Pt to receive 4 mg po warfarin this evening. Daily PT/INR until stable within therapeutic range.    Jean-Paul Tran, PharmD 2/21/2020 8:17 AM

## 2020-02-21 NOTE — PROGRESS NOTES
Hospitalist Progress Note  2/21/2020 7:23 AM  Subjective:   Admit Date: 2/19/2020  PCP: Marlo Jones    Interval History:      The patient has no complaints. He has aphasia and it makes it difficult to communicate with him. He denies feeling short of breath, denies having cough, chest pain, abdominal pain. The nurse reports that the patient had a blood tinged sputum this morning. His 2D echo revealed ejection fraction of 55%, moderate left ear and moderate MR. Follow-up chest x-ray done yesterday showed mildly increasing interstitial and alveolar opacities in the left lung and stable interstitial opacities in the right lung. Superimposed pneumonic infiltrate not excluded. The patient had no fevers, hemodynamically stable.       Diet: DIET LOW SODIUM 2 GM; Carb Control: 4 carb choices (60 gms)/meal  Medications:   Scheduled Meds:   furosemide  20 mg Intravenous Daily    levofloxacin  250 mg Intravenous Q24H    cefTRIAXone (ROCEPHIN) IV  1 g Intravenous Q24H    ipratropium-albuterol  1 ampule Inhalation Q4H WA    acetaminophen  1,000 mg Oral TID    amLODIPine  5 mg Oral Daily    atorvastatin  40 mg Oral Nightly    citalopram  10 mg Oral BID    hydrALAZINE  50 mg Oral TID    insulin glargine  10 Units Subcutaneous BID    metoprolol tartrate  25 mg Oral BID    senna  2 tablet Oral BID    sodium chloride flush  10 mL Intravenous 2 times per day    lisinopril  5 mg Oral Daily    insulin lispro  0-6 Units Subcutaneous TID WC    insulin lispro  0-3 Units Subcutaneous Nightly    warfarin (COUMADIN) daily dosing (placeholder)   Other RX Placeholder     Continuous Infusions:   dextrose       PRN Medications: sodium chloride flush, polyethylene glycol, promethazine, ondansetron, glucose, dextrose, glucagon (rDNA), dextrose    Objective:   Vitals: /63   Pulse 63   Temp 98 °F (36.7 °C) (Oral)   Resp 14   Ht 5' 8\" (1.727 m)   Wt 186 lb 11.2 oz (84.7 kg)   SpO2 93%   BMI 28.39 kg/m²   BMI: Body mass index is 28.39 kg/m². CBC:   Recent Labs     02/19/20  0915   WBC 9.8   HGB 12.1*        BMP:    Recent Labs     02/19/20  0915 02/20/20  0557 02/21/20  0548    142 139   K 4.4 3.8 3.9    104 103   CO2 23 23 23   BUN 26* 27* 24*   CREATININE 1.62* 1.64* 1.63*   GLUCOSE 110* 80 83   Lipids:   Recent Labs     02/20/20  0557   CHOL 108   HDL 36*     INR:   Recent Labs     02/19/20  0915 02/20/20  0557 02/21/20  0548   INR 1.7 1.9 1.9       Physical Exam:      General Appearance: Awake, alert, cooperative, in no acute distress, has Broca's aphasia and difficulty with speech  Cardiovascular: normal rate, regular rhythm, normal S1 and S2, 4/6 high-pitched systolic murmur  Pulmonary/Chest: Mild bibasilar crackles, no wheezing, no rhonchi, normal air movement, no respiratory distress  Abdomen: soft, non-tender, non-distended, normal bowel sounds  Extremities: no cyanosis, clubbing or edema, pedal pulses 2+  Skin: warm and dry, no rash or erythema  Neurological: alert, oriented, aphasia, plegic right arm and right lower extremity    Assessment and Plan:     1. Shortness of breath -initially suspected to have CHF exacerbation, will started on IV Lasix. Chest x-ray did not show any improvement after diuresis. 2D echo shows EF 55%, moderate left ear and MR.  CT scan chest ordered by Dr. Ady Carlos for further evaluation. Suspect possible pneumonia. Started on IV Levaquin. He is also on Rocephin. 2.  UTI secondary to E. coli, pansensitive -continue on IV antibiotics  3. Hypertension -blood pressure well controlled, continue on current medications  4. History of atrial fibrillation -on Coumadin, INR is 1.9, follow-up with PT and INR daily  5. 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 CKD stage III  7.   Diabetes mellitus type 2, insulin-dependent, controlled -continue Lantus and sliding scale insulin        Patient continues to require inpatient admission related to need for IV antibiotics, further work-up of shortness of breath      Electronically signed by Dayron Zheng MD on 2/21/2020 at 7:23 AM    Rounding Hospitalist

## 2020-02-21 NOTE — PROGRESS NOTES
SWING BED PROGRAM PRE-ADMISSION ASSESSMENT  (TRADITIONAL MEDICARE PATIENTS)  Patient Name: Jonnathan Moreland      : 1934  (80 y.o.) Gender: male   Room: [address] [place] [address] MRN: [id number]      Inpatient Admission Date: 2020- 2020 Date & Time of Referral: 2020     Referred By: Kamala Shane MD Referred from:  [x] W    [] Other:     # of Skilled Care Days Used in Last 60 days: 0 Insurance: [x]  Medicare                      [x]  Secondary - Type:     Present Condition/Diagnosis:    CHF with unknown LVEF ([facility] [address] [I50.9]      Previous Medical History:   Past Medical History:   Diagnosis Date    Cerebrovascular disease     Diverticula of colon 6/23/10    scattered, mid transverse colon    H/O ischemic left MCA stroke 2016    Hyperlipidemia     Hypertension     Polyp of colon     mucosal    Tonsil, abscess         ADL Performance Last Seven (7) Days (Please Score)   Patients performance over all shifts during last seven (7) days     0 = Independent - No help or oversight  1 = Supervision - Oversight, encouragement or cueing provided  2 = Limited Assist -Highly involved in activity; assistance in guided maneuvering of limbs or other non-weight-bearing assistance  3 = Extensive Assist - Receives physical help in guided maneuvering of limbs or other non-weight bearing assistance  4 = Total Dependence - Full staff performance of activity   7 = Activity occurred only once or twice  8 = Activity did not occur - Activity did not occur or family and/or non- facility staff provided care 100% of the time for that activity over a 7-day period.      SCORE   BED MOBILITY - How client moves to and from lying position, turns side to side, and positions body while in bed 4   TRANSFER - How resident moves between surfaces - to/from: bed, chair, wheelchair, standing position (EXCLUDE to/from bath/toilet) 4   TOILET USE - How client uses the toilet room (or commode, bedpan, urinal);transfers on/off toilet, cleanses, changes pad, manages ostomy or catheter, adjusts clothes 4   EATING - How client eats and drinks (regardless of skill). Includes intake of nourishment by other means (e.g., tube feeding, total parenteral nutrition) 2   Estimated Pre-Admission ADL Value: 14     PATIENT WILL RECEIVE THE FOLLOWING SKILLED SERVICES AS A SWING BED PATIENT:       REHABILITATION (PT/OT/SP)    [] ULTRA HIGH 720 or more minutes minimum per week of at least two (2) disciplines - 1st for at least five (5) days and 2nd for at least three (3) days   [] VERY HIGH 500 or more minutes minimum per week of at least one (1) discipline for at least five (5) days   [] HIGH 325 or more minutes minimum per week of at least one (1) discipline for at least five (5) days   [] MEDIUM 150 or more minutes minimum per week at least five (5) days of any combination with three (3) therapies   [] LOW Restorative nursing at least six (6) days, two (2) activities, or therapies for at least three (3) days at least forty-five (45) minute per week minimum services. EXTENSIVE SERVICES   [] Tracheostomy Care   [] Ventilator/Respirator   [] Infection Isolation   SPECIAL CARE HIGH   [] MS with ADL greater than or equal to 10  [] Quadriplegic with ADL greater than or equal to 5  [] emphysema/COPD and shortness of breath when lying flat  [] Fever w/at least one (1) of the following: [] dehydration [] pneumonia [] vomiting [] weight loss  [] feeding tube  [] Septicemia  [] Coma (not awake & completely dependent in ADL)  [] Diabetes and injections seven (7) days and Dr. order change two (2) or more days.   [] Parenteral/IV feeding  [] respiratory therapy for seven (7) days   SPECIAL CARE LOW:   [] Respiratory Therapy  [] Ulcers (2+sites all stages) w/treatment  [] Multiple Sclerosis  [] Cerebral Palsy  [] Parkinsons Disease  [] Oxygen Therapy  [] Extensive care services w/ADL less than 6  [] Fever w/at least one (1) of the following: [] dehydration [] pneumonia

## 2020-02-22 ENCOUNTER — HOSPITAL ENCOUNTER (INPATIENT)
Age: 85
LOS: 5 days | Discharge: HOME | DRG: 194 | End: 2020-02-27
Attending: INTERNAL MEDICINE | Admitting: INTERNAL MEDICINE
Payer: MEDICARE

## 2020-02-22 VITALS
BODY MASS INDEX: 27.84 KG/M2 | OXYGEN SATURATION: 92 % | HEIGHT: 68 IN | RESPIRATION RATE: 20 BRPM | DIASTOLIC BLOOD PRESSURE: 56 MMHG | TEMPERATURE: 97.8 F | SYSTOLIC BLOOD PRESSURE: 138 MMHG | HEART RATE: 68 BPM | WEIGHT: 183.7 LBS

## 2020-02-22 PROBLEM — J18.9 PNEUMONIA: Status: ACTIVE | Noted: 2020-02-22

## 2020-02-22 LAB
ABSOLUTE EOS #: 0.3 K/UL (ref 0–0.4)
ABSOLUTE IMMATURE GRANULOCYTE: ABNORMAL K/UL (ref 0–0.3)
ABSOLUTE LYMPH #: 1.3 K/UL (ref 1–4.8)
ABSOLUTE MONO #: 0.9 K/UL (ref 0–1)
ANION GAP SERPL CALCULATED.3IONS-SCNC: 12 MMOL/L (ref 9–17)
BASOPHILS # BLD: 0 % (ref 0–2)
BASOPHILS ABSOLUTE: 0 K/UL (ref 0–0.2)
BUN BLDV-MCNC: 25 MG/DL (ref 8–23)
BUN/CREAT BLD: 15 (ref 9–20)
CALCIUM SERPL-MCNC: 9 MG/DL (ref 8.6–10.4)
CHLORIDE BLD-SCNC: 106 MMOL/L (ref 98–107)
CO2: 23 MMOL/L (ref 20–31)
CREAT SERPL-MCNC: 1.64 MG/DL (ref 0.7–1.2)
DIFFERENTIAL TYPE: YES
EOSINOPHILS RELATIVE PERCENT: 3 % (ref 0–5)
GFR AFRICAN AMERICAN: 49 ML/MIN
GFR NON-AFRICAN AMERICAN: 40 ML/MIN
GFR SERPL CREATININE-BSD FRML MDRD: ABNORMAL ML/MIN/{1.73_M2}
GFR SERPL CREATININE-BSD FRML MDRD: ABNORMAL ML/MIN/{1.73_M2}
GLUCOSE BLD-MCNC: 107 MG/DL (ref 65–99)
GLUCOSE BLD-MCNC: 155 MG/DL (ref 65–99)
GLUCOSE BLD-MCNC: 170 MG/DL (ref 65–99)
GLUCOSE BLD-MCNC: 72 MG/DL (ref 65–99)
GLUCOSE BLD-MCNC: 79 MG/DL (ref 70–99)
HCT VFR BLD CALC: 31.1 % (ref 41–53)
HEMOGLOBIN: 10.2 G/DL (ref 13.5–17.5)
IMMATURE GRANULOCYTES: ABNORMAL %
INR BLD: 1.8
LYMPHOCYTES # BLD: 15 % (ref 13–44)
MCH RBC QN AUTO: 30.7 PG (ref 26–34)
MCHC RBC AUTO-ENTMCNC: 32.8 G/DL (ref 31–37)
MCV RBC AUTO: 93.6 FL (ref 80–100)
MONOCYTES # BLD: 10 % (ref 5–9)
NRBC AUTOMATED: ABNORMAL PER 100 WBC
PDW BLD-RTO: 15 % (ref 12.1–15.2)
PLATELET # BLD: 311 K/UL (ref 140–450)
PLATELET ESTIMATE: ABNORMAL
PMV BLD AUTO: ABNORMAL FL (ref 6–12)
POTASSIUM SERPL-SCNC: 4 MMOL/L (ref 3.7–5.3)
PROTHROMBIN TIME: 18.1 SEC (ref 9–11.6)
RBC # BLD: 3.32 M/UL (ref 4.5–5.9)
RBC # BLD: ABNORMAL 10*6/UL
SEG NEUTROPHILS: 72 % (ref 39–75)
SEGMENTED NEUTROPHILS ABSOLUTE COUNT: 6.6 K/UL (ref 2.1–6.5)
SODIUM BLD-SCNC: 141 MMOL/L (ref 135–144)
WBC # BLD: 9.1 K/UL (ref 3.5–11)
WBC # BLD: ABNORMAL 10*3/UL

## 2020-02-22 PROCEDURE — 94640 AIRWAY INHALATION TREATMENT: CPT

## 2020-02-22 PROCEDURE — 6370000000 HC RX 637 (ALT 250 FOR IP): Performed by: INTERNAL MEDICINE

## 2020-02-22 PROCEDURE — 94761 N-INVAS EAR/PLS OXIMETRY MLT: CPT

## 2020-02-22 PROCEDURE — 6360000002 HC RX W HCPCS: Performed by: INTERNAL MEDICINE

## 2020-02-22 PROCEDURE — 82947 ASSAY GLUCOSE BLOOD QUANT: CPT

## 2020-02-22 PROCEDURE — 2580000003 HC RX 258: Performed by: INTERNAL MEDICINE

## 2020-02-22 PROCEDURE — 2700000000 HC OXYGEN THERAPY PER DAY

## 2020-02-22 PROCEDURE — 85610 PROTHROMBIN TIME: CPT

## 2020-02-22 PROCEDURE — 1200000002 HC SEMI PRIVATE SWING BED

## 2020-02-22 PROCEDURE — 80048 BASIC METABOLIC PNL TOTAL CA: CPT

## 2020-02-22 PROCEDURE — 85025 COMPLETE CBC W/AUTO DIFF WBC: CPT

## 2020-02-22 PROCEDURE — 36415 COLL VENOUS BLD VENIPUNCTURE: CPT

## 2020-02-22 RX ORDER — FUROSEMIDE 10 MG/ML
20 INJECTION INTRAMUSCULAR; INTRAVENOUS 2 TIMES DAILY
Status: DISCONTINUED | OUTPATIENT
Start: 2020-02-22 | End: 2020-02-24

## 2020-02-22 RX ORDER — SODIUM CHLORIDE 0.9 % (FLUSH) 0.9 %
10 SYRINGE (ML) INJECTION PRN
Status: DISCONTINUED | OUTPATIENT
Start: 2020-02-22 | End: 2020-02-27 | Stop reason: HOSPADM

## 2020-02-22 RX ORDER — LISINOPRIL 5 MG/1
5 TABLET ORAL DAILY
Status: DISCONTINUED | OUTPATIENT
Start: 2020-02-23 | End: 2020-02-27 | Stop reason: HOSPADM

## 2020-02-22 RX ORDER — LACTOBACILLUS RHAMNOSUS GG 10B CELL
1 CAPSULE ORAL
Status: DISCONTINUED | OUTPATIENT
Start: 2020-02-23 | End: 2020-02-27 | Stop reason: HOSPADM

## 2020-02-22 RX ORDER — ACETAMINOPHEN 500 MG
1000 TABLET ORAL 3 TIMES DAILY
Status: DISCONTINUED | OUTPATIENT
Start: 2020-02-22 | End: 2020-02-27 | Stop reason: HOSPADM

## 2020-02-22 RX ORDER — SENNA PLUS 8.6 MG/1
2 TABLET ORAL 2 TIMES DAILY
Status: DISCONTINUED | OUTPATIENT
Start: 2020-02-22 | End: 2020-02-27 | Stop reason: HOSPADM

## 2020-02-22 RX ORDER — ACETAMINOPHEN 500 MG
1000 TABLET ORAL 3 TIMES DAILY
Status: CANCELLED | OUTPATIENT
Start: 2020-02-22

## 2020-02-22 RX ORDER — WARFARIN SODIUM 5 MG/1
5 TABLET ORAL
Status: COMPLETED | OUTPATIENT
Start: 2020-02-22 | End: 2020-02-22

## 2020-02-22 RX ORDER — AMLODIPINE BESYLATE 5 MG/1
5 TABLET ORAL DAILY
Status: DISCONTINUED | OUTPATIENT
Start: 2020-02-23 | End: 2020-02-27 | Stop reason: HOSPADM

## 2020-02-22 RX ORDER — PROMETHAZINE HYDROCHLORIDE 25 MG/1
12.5 TABLET ORAL EVERY 6 HOURS PRN
Status: DISCONTINUED | OUTPATIENT
Start: 2020-02-22 | End: 2020-02-27 | Stop reason: HOSPADM

## 2020-02-22 RX ORDER — DEXTROSE MONOHYDRATE 50 MG/ML
100 INJECTION, SOLUTION INTRAVENOUS PRN
Status: DISCONTINUED | OUTPATIENT
Start: 2020-02-22 | End: 2020-02-27 | Stop reason: HOSPADM

## 2020-02-22 RX ORDER — DEXTROSE MONOHYDRATE 25 G/50ML
12.5 INJECTION, SOLUTION INTRAVENOUS PRN
Status: DISCONTINUED | OUTPATIENT
Start: 2020-02-22 | End: 2020-02-27 | Stop reason: HOSPADM

## 2020-02-22 RX ORDER — NICOTINE POLACRILEX 4 MG
15 LOZENGE BUCCAL PRN
Status: DISCONTINUED | OUTPATIENT
Start: 2020-02-22 | End: 2020-02-27 | Stop reason: HOSPADM

## 2020-02-22 RX ORDER — POLYETHYLENE GLYCOL 3350 17 G/17G
17 POWDER, FOR SOLUTION ORAL DAILY PRN
Status: DISCONTINUED | OUTPATIENT
Start: 2020-02-22 | End: 2020-02-27 | Stop reason: HOSPADM

## 2020-02-22 RX ORDER — CITALOPRAM 20 MG/1
10 TABLET ORAL 2 TIMES DAILY
Status: DISCONTINUED | OUTPATIENT
Start: 2020-02-22 | End: 2020-02-27 | Stop reason: HOSPADM

## 2020-02-22 RX ORDER — SODIUM CHLORIDE 0.9 % (FLUSH) 0.9 %
10 SYRINGE (ML) INJECTION EVERY 12 HOURS SCHEDULED
Status: DISCONTINUED | OUTPATIENT
Start: 2020-02-22 | End: 2020-02-27 | Stop reason: HOSPADM

## 2020-02-22 RX ORDER — ONDANSETRON 2 MG/ML
4 INJECTION INTRAMUSCULAR; INTRAVENOUS EVERY 6 HOURS PRN
Status: DISCONTINUED | OUTPATIENT
Start: 2020-02-22 | End: 2020-02-27 | Stop reason: HOSPADM

## 2020-02-22 RX ORDER — ATORVASTATIN CALCIUM 20 MG/1
40 TABLET, FILM COATED ORAL NIGHTLY
Status: DISCONTINUED | OUTPATIENT
Start: 2020-02-22 | End: 2020-02-27 | Stop reason: HOSPADM

## 2020-02-22 RX ORDER — INSULIN GLARGINE 100 [IU]/ML
10 INJECTION, SOLUTION SUBCUTANEOUS 2 TIMES DAILY
Status: DISCONTINUED | OUTPATIENT
Start: 2020-02-22 | End: 2020-02-27 | Stop reason: HOSPADM

## 2020-02-22 RX ORDER — HYDRALAZINE HYDROCHLORIDE 25 MG/1
50 TABLET, FILM COATED ORAL 3 TIMES DAILY
Status: DISCONTINUED | OUTPATIENT
Start: 2020-02-22 | End: 2020-02-27 | Stop reason: HOSPADM

## 2020-02-22 RX ORDER — IPRATROPIUM BROMIDE AND ALBUTEROL SULFATE 2.5; .5 MG/3ML; MG/3ML
1 SOLUTION RESPIRATORY (INHALATION)
Status: DISCONTINUED | OUTPATIENT
Start: 2020-02-22 | End: 2020-02-27 | Stop reason: HOSPADM

## 2020-02-22 RX ADMIN — CEFTRIAXONE 1 G: 1 INJECTION, POWDER, FOR SOLUTION INTRAMUSCULAR; INTRAVENOUS at 10:14

## 2020-02-22 RX ADMIN — SENNOSIDES 17.2 MG: 8.6 TABLET, FILM COATED ORAL at 10:15

## 2020-02-22 RX ADMIN — METOPROLOL TARTRATE 25 MG: 25 TABLET ORAL at 10:15

## 2020-02-22 RX ADMIN — ACETAMINOPHEN 1000 MG: 500 TABLET, FILM COATED ORAL at 10:15

## 2020-02-22 RX ADMIN — Medication 10 UNITS: at 20:46

## 2020-02-22 RX ADMIN — SENNOSIDES 17.2 MG: 8.6 TABLET, FILM COATED ORAL at 20:44

## 2020-02-22 RX ADMIN — Medication 10 ML: at 20:38

## 2020-02-22 RX ADMIN — IPRATROPIUM BROMIDE AND ALBUTEROL SULFATE 1 AMPULE: .5; 3 SOLUTION RESPIRATORY (INHALATION) at 20:58

## 2020-02-22 RX ADMIN — INSULIN LISPRO 1 UNITS: 100 INJECTION, SOLUTION INTRAVENOUS; SUBCUTANEOUS at 11:58

## 2020-02-22 RX ADMIN — Medication 10 UNITS: at 10:16

## 2020-02-22 RX ADMIN — PIPERACILLIN SODIUM AND TAZOBACTAM SODIUM 3.38 G: 3; .375 INJECTION, POWDER, LYOPHILIZED, FOR SOLUTION INTRAVENOUS at 13:01

## 2020-02-22 RX ADMIN — IPRATROPIUM BROMIDE AND ALBUTEROL SULFATE 1 AMPULE: .5; 3 SOLUTION RESPIRATORY (INHALATION) at 16:51

## 2020-02-22 RX ADMIN — FUROSEMIDE 20 MG: 10 INJECTION, SOLUTION INTRAMUSCULAR; INTRAVENOUS at 18:01

## 2020-02-22 RX ADMIN — ATORVASTATIN CALCIUM 40 MG: 20 TABLET, FILM COATED ORAL at 20:45

## 2020-02-22 RX ADMIN — WARFARIN SODIUM 5 MG: 5 TABLET ORAL at 22:06

## 2020-02-22 RX ADMIN — HYDRALAZINE HYDROCHLORIDE 50 MG: 25 TABLET, FILM COATED ORAL at 14:17

## 2020-02-22 RX ADMIN — HYDRALAZINE HYDROCHLORIDE 50 MG: 25 TABLET, FILM COATED ORAL at 20:44

## 2020-02-22 RX ADMIN — LISINOPRIL 5 MG: 5 TABLET ORAL at 10:14

## 2020-02-22 RX ADMIN — Medication 10 ML: at 10:14

## 2020-02-22 RX ADMIN — Medication 1 CAPSULE: at 10:15

## 2020-02-22 RX ADMIN — HYDRALAZINE HYDROCHLORIDE 50 MG: 25 TABLET, FILM COATED ORAL at 10:14

## 2020-02-22 RX ADMIN — INSULIN LISPRO 1 UNITS: 100 INJECTION, SOLUTION INTRAVENOUS; SUBCUTANEOUS at 20:44

## 2020-02-22 RX ADMIN — IPRATROPIUM BROMIDE AND ALBUTEROL SULFATE 1 AMPULE: .5; 3 SOLUTION RESPIRATORY (INHALATION) at 09:22

## 2020-02-22 RX ADMIN — FUROSEMIDE 20 MG: 10 INJECTION, SOLUTION INTRAMUSCULAR; INTRAVENOUS at 10:16

## 2020-02-22 RX ADMIN — PIPERACILLIN SODIUM AND TAZOBACTAM SODIUM 3.38 G: 3; .375 INJECTION, POWDER, LYOPHILIZED, FOR SOLUTION INTRAVENOUS at 20:27

## 2020-02-22 RX ADMIN — IPRATROPIUM BROMIDE AND ALBUTEROL SULFATE 1 AMPULE: .5; 3 SOLUTION RESPIRATORY (INHALATION) at 05:18

## 2020-02-22 RX ADMIN — AMLODIPINE BESYLATE 5 MG: 5 TABLET ORAL at 10:17

## 2020-02-22 RX ADMIN — CITALOPRAM HYDROBROMIDE 10 MG: 20 TABLET ORAL at 10:15

## 2020-02-22 RX ADMIN — CITALOPRAM HYDROBROMIDE 10 MG: 20 TABLET ORAL at 20:44

## 2020-02-22 ASSESSMENT — PAIN SCALES - GENERAL
PAINLEVEL_OUTOF10: 0

## 2020-02-22 NOTE — H&P
atorvastatin (LIPITOR) 40 MG tablet Take 1 tablet by mouth nightly 30 tablet 3    metoprolol tartrate (LOPRESSOR) 50 MG tablet Take 1 tablet by mouth 2 times daily (Patient taking differently: Take 25 mg by mouth 2 times daily ) 60 tablet 3    amLODIPine (NORVASC) 5 MG tablet Take 1 tablet by mouth daily 30 tablet 3    ipratropium-albuterol (DUONEB) 0.5-2.5 (3) MG/3ML SOLN nebulizer solution Inhale 3 mLs into the lungs every 6 hours as needed for Shortness of Breath 360 mL 3       Allergies:  Patient has no known allergies. Social History:    reports that he has never smoked. He has never used smokeless tobacco. He reports that he does not drink alcohol or use drugs. Family History:   History reviewed. No pertinent family history. Review of systems:      Constitutional: no fever, no night sweats, no fatigue  Head: no headache, no head injury, no migranes. Eye: no blurring of vision, no double vision. Ears: no hearing difficulty, no tinnitus  Mouth/throat: no ulceration, dental caries, dysphagia  Lungs: Positive for cough,  shortness of breath, no wheeze  CVS: no palpitation, no chest pain  GI: no abdominal pain, no nausea , no vomiting, no constipation  SUAD: no dysuria, frequency and urgency  Musculoskeletal: no joint pain, swelling , stiffness  Endocrine: no polyuria, polydypsia, no cold or heat intolerence  Hematology: no anemia, no easy brusing or bleeding  Dermatology: no skin rash  Psychiatry: no depression, no anxiety  Neurology: no syncope, no seizures, no numbness or tingling of hands, no numbness or tingling of feet, positive for right-sided weakness, positive for Broca's aphasia related to previous history of stroke      Vitals: There were no vitals filed for this visit. BMI: Body mass index is 27.93 kg/m².     Physical Exam:      General Appearance: alert and oriented to person, place and time, in no acute distress, has Broca's aphasia and difficulty with speech  Cardiovascular: normal rate, regular rhythm, normal S1 and S2, 4/6 high-pitched systolic murmur  Pulmonary/Chest: bilateral crackles at the bases, no wheezes, rales or rhonchi, normal air movement, no respiratory distress  Abdomen: soft, non-tender, non-distended, normal bowel sounds  Extremities: no cyanosis, clubbing or edema, pedal pulses 2+  Skin: warm and dry, no rash or erythema  Head: normocephalic and atraumatic  Eyes: pupils equal, round, and reactive to light  Neck: supple and non-tender without mass, no thyromegaly   Musculoskeletal: Patient with right-sided weakness, unable to elevate R arm or leg  Neurological: alert, oriented, aphasia, plegic right arm and right lower extremity         Review of Labs and Diagnostic Testing:    Recent Results (from the past 24 hour(s))   Glucose, Whole Blood    Collection Time: 02/21/20  5:15 PM   Result Value Ref Range    POC Glucose 96 65 - 99 mg/dL   Glucose, Whole Blood    Collection Time: 02/21/20  8:38 PM   Result Value Ref Range    POC Glucose 124 (H) 65 - 99 mg/dL   Protime-INR    Collection Time: 02/22/20  5:51 AM   Result Value Ref Range    Protime 18.1 (H) 9.0 - 11.6 sec    INR 1.8    Basic Metabolic Panel    Collection Time: 02/22/20  5:51 AM   Result Value Ref Range    Glucose 79 70 - 99 mg/dL    BUN 25 (H) 8 - 23 mg/dL    CREATININE 1.64 (H) 0.70 - 1.20 mg/dL    Bun/Cre Ratio 15 9 - 20    Calcium 9.0 8.6 - 10.4 mg/dL    Sodium 141 135 - 144 mmol/L    Potassium 4.0 3.7 - 5.3 mmol/L    Chloride 106 98 - 107 mmol/L    CO2 23 20 - 31 mmol/L    Anion Gap 12 9 - 17 mmol/L    GFR Non-African American 40 (L) >60 mL/min    GFR  49 (L) >60 mL/min    GFR Comment          GFR Staging NOT REPORTED    CBC Auto Differential    Collection Time: 02/22/20  5:51 AM   Result Value Ref Range    WBC 9.1 3.5 - 11.0 k/uL    RBC 3.32 (L) 4.5 - 5.9 m/uL    Hemoglobin 10.2 (L) 13.5 - 17.5 g/dL    Hematocrit 31.1 (L) 41 - 53 %    MCV 93.6 80 - 100 fL    MCH 30.7 26 - 34 pg    MCHC 32.8 31 - 37 g/dL    RDW 15.0 12.1 - 15.2 %    Platelets 377 051 - 905 k/uL    MPV NOT REPORTED 6.0 - 12.0 fL    NRBC Automated NOT REPORTED per 100 WBC    Differential Type YES     Seg Neutrophils 72 39 - 75 %    Lymphocytes 15 13 - 44 %    Monocytes 10 (H) 5 - 9 %    Eosinophils % 3 0 - 5 %    Basophils 0 0 - 2 %    Immature Granulocytes NOT REPORTED 0 %    Segs Absolute 6.60 (H) 2.1 - 6.5 k/uL    Absolute Lymph # 1.30 1.0 - 4.8 k/uL    Absolute Mono # 0.90 0.0 - 1.0 k/uL    Absolute Eos # 0.30 0.0 - 0.4 k/uL    Basophils Absolute 0.00 0.0 - 0.2 k/uL    Absolute Immature Granulocyte NOT REPORTED 0.00 - 0.30 k/uL    WBC Morphology NOT REPORTED     RBC Morphology NOT REPORTED     Platelet Estimate NOT REPORTED    Glucose, Whole Blood    Collection Time: 02/22/20  7:41 AM   Result Value Ref Range    POC Glucose 72 65 - 99 mg/dL   Glucose, Whole Blood    Collection Time: 02/22/20 11:53 AM   Result Value Ref Range    POC Glucose 170 (H) 65 - 99 mg/dL         Assessment/ Plan:      1. Pneumonia - possibly aspiration. Started on Zosyn. Will c/s ST for dysphagia evaluation. 2.  Moderate-sized bilateral pleural effusions - continue on IV Lasix, monitor electrolyrtes, renal function, daily weights  3. UTI secondary to E. Coli - on IV Zosyn  4. Moderate aortic stenosis and moderate MR  5. History of atrial fibrillation, anticoagulated with Coumadin  6. History of ischemic CVA in 2016 resulting in right-sided weakness and aphasia - continue medical management   7. Chronic kidney disease stage 3 - at baseline, monitor while on IV Lasix  8. Diabetes mellitus type 2, insulin-dependent, well controlled - continue on Lantus 10 units bid and sliding scale Lispro  9. HTN --BP stable on current meds      DNR CC      The beneficiary may reasonably be expected to be discharged or transferred to a hospital within 96 hours after admission.     DVT prophylaxis:   [] Lovenox   [] SCDs   [] SQ Heparin   [] Encourage ambulation, low risk for DVT, no chemical or mechanical    prophylaxis necessary      [] Already on Anticoagulation    Anticipated Disposition upon discharge:   [] Home   [] Home with [address]   [] [facility]   [] [facility] [address]      Electronically signed by Bee Stahl MD on 2/22/2020 at 1:42 PM

## 2020-02-22 NOTE — DISCHARGE SUMMARY
CBC revealed normal white count of 9.8, H&H 12.1/36. 3. EKG revealed normal sinus rhythm, rate of 67, no acute changes. Chest x-ray showed increased congestion and mixed pulmonary opacities bilaterally worse in the left base, evidence of CHF and moderate edema. UA revealed 20-50 WBCs, positive nitrate and 1+ leukocyte esterase, 4+ bacteria. It was believed that the patient was in CHF and for this reason he was deemed appropriate for admission. He was started on IV Lasix. He was also started on IV Rocephin for UTI. He was seen in consultation by Dr. May Sanchez. 2D echo was done and revealed normal LV size, moderate to severe LV hypertrophy, systolic function normal with an estimated ejection fraction of 55%, moderately dilated right ventricular cavity, mild to moderate aortic insufficiency, moderate mitral regurgitation, moderate to severe TR, moderate AS. The patient's clinical presentation was concerning for more of infectious process in his lungs versus CHF. He had CT scan of chest done for this reason, unfortunately it was a limited study, however did show at least early pneumonia in the left lung suprahilar region, moderate bilateral pleural effusions, changes of congestive heart failure. The patient will be started on IV Zosyn for 5 days for presumed aspiration pneumonia due to history of CVA and dysphagia. Will consult speech therapist to evaluate the patient. We will continue IV Lasix due to probable CHF and bilateral pleural effusions. The patient is agreeable for admission to swing bed program to continue IV antibiotic course. Continue monitoring electrolyte renal function.      Disposition: Swing bed    Condition: Stable    Time Spent: 34 minutes      Electronically signed by Amauri Reaves MD on 2/22/2020 at [address] [place] [address]

## 2020-02-22 NOTE — PROGRESS NOTES
Nurse from [facility] into see patient. Updated on vitals and how patient has been doing. Pt is sleeping at this time.

## 2020-02-23 LAB
ABSOLUTE EOS #: 0.3 K/UL (ref 0–0.4)
ABSOLUTE IMMATURE GRANULOCYTE: ABNORMAL K/UL (ref 0–0.3)
ABSOLUTE LYMPH #: 1.2 K/UL (ref 1–4.8)
ABSOLUTE MONO #: 0.9 K/UL (ref 0–1)
ANION GAP SERPL CALCULATED.3IONS-SCNC: 13 MMOL/L (ref 9–17)
BASOPHILS # BLD: 1 % (ref 0–2)
BASOPHILS ABSOLUTE: 0 K/UL (ref 0–0.2)
BUN BLDV-MCNC: 27 MG/DL (ref 8–23)
BUN/CREAT BLD: 14 (ref 9–20)
CALCIUM SERPL-MCNC: 9.2 MG/DL (ref 8.6–10.4)
CHLORIDE BLD-SCNC: 106 MMOL/L (ref 98–107)
CO2: 23 MMOL/L (ref 20–31)
CREAT SERPL-MCNC: 1.88 MG/DL (ref 0.7–1.2)
DIFFERENTIAL TYPE: YES
EOSINOPHILS RELATIVE PERCENT: 3 % (ref 0–5)
GFR AFRICAN AMERICAN: 42 ML/MIN
GFR NON-AFRICAN AMERICAN: 34 ML/MIN
GFR SERPL CREATININE-BSD FRML MDRD: ABNORMAL ML/MIN/{1.73_M2}
GFR SERPL CREATININE-BSD FRML MDRD: ABNORMAL ML/MIN/{1.73_M2}
GLUCOSE BLD-MCNC: 128 MG/DL (ref 65–99)
GLUCOSE BLD-MCNC: 76 MG/DL (ref 65–99)
GLUCOSE BLD-MCNC: 81 MG/DL (ref 65–99)
GLUCOSE BLD-MCNC: 88 MG/DL (ref 65–99)
GLUCOSE BLD-MCNC: 88 MG/DL (ref 70–99)
HCT VFR BLD CALC: 30.5 % (ref 41–53)
HEMOGLOBIN: 9.9 G/DL (ref 13.5–17.5)
IMMATURE GRANULOCYTES: ABNORMAL %
INR BLD: 1.7
LYMPHOCYTES # BLD: 14 % (ref 13–44)
MCH RBC QN AUTO: 30.7 PG (ref 26–34)
MCHC RBC AUTO-ENTMCNC: 32.6 G/DL (ref 31–37)
MCV RBC AUTO: 94.3 FL (ref 80–100)
MONOCYTES # BLD: 10 % (ref 5–9)
NRBC AUTOMATED: ABNORMAL PER 100 WBC
PDW BLD-RTO: 14.9 % (ref 12.1–15.2)
PLATELET # BLD: 331 K/UL (ref 140–450)
PLATELET ESTIMATE: ABNORMAL
PMV BLD AUTO: ABNORMAL FL (ref 6–12)
POTASSIUM SERPL-SCNC: 4.2 MMOL/L (ref 3.7–5.3)
PROTHROMBIN TIME: 17.1 SEC (ref 9–11.6)
RBC # BLD: 3.23 M/UL (ref 4.5–5.9)
RBC # BLD: ABNORMAL 10*6/UL
SEG NEUTROPHILS: 72 % (ref 39–75)
SEGMENTED NEUTROPHILS ABSOLUTE COUNT: 6.2 K/UL (ref 2.1–6.5)
SODIUM BLD-SCNC: 142 MMOL/L (ref 135–144)
WBC # BLD: 8.5 K/UL (ref 3.5–11)
WBC # BLD: ABNORMAL 10*3/UL

## 2020-02-23 PROCEDURE — 2580000003 HC RX 258: Performed by: INTERNAL MEDICINE

## 2020-02-23 PROCEDURE — 6360000002 HC RX W HCPCS: Performed by: INTERNAL MEDICINE

## 2020-02-23 PROCEDURE — 94640 AIRWAY INHALATION TREATMENT: CPT

## 2020-02-23 PROCEDURE — 80048 BASIC METABOLIC PNL TOTAL CA: CPT

## 2020-02-23 PROCEDURE — 36415 COLL VENOUS BLD VENIPUNCTURE: CPT

## 2020-02-23 PROCEDURE — 82947 ASSAY GLUCOSE BLOOD QUANT: CPT

## 2020-02-23 PROCEDURE — 6370000000 HC RX 637 (ALT 250 FOR IP): Performed by: INTERNAL MEDICINE

## 2020-02-23 PROCEDURE — 85610 PROTHROMBIN TIME: CPT

## 2020-02-23 PROCEDURE — 1200000002 HC SEMI PRIVATE SWING BED

## 2020-02-23 PROCEDURE — 2500000003 HC RX 250 WO HCPCS: Performed by: INTERNAL MEDICINE

## 2020-02-23 PROCEDURE — 94761 N-INVAS EAR/PLS OXIMETRY MLT: CPT

## 2020-02-23 PROCEDURE — 2700000000 HC OXYGEN THERAPY PER DAY

## 2020-02-23 PROCEDURE — 85025 COMPLETE CBC W/AUTO DIFF WBC: CPT

## 2020-02-23 RX ORDER — WARFARIN SODIUM 5 MG/1
5 TABLET ORAL
Status: COMPLETED | OUTPATIENT
Start: 2020-02-23 | End: 2020-02-23

## 2020-02-23 RX ORDER — LEVOFLOXACIN 5 MG/ML
250 INJECTION, SOLUTION INTRAVENOUS EVERY 24 HOURS
Status: DISCONTINUED | OUTPATIENT
Start: 2020-02-24 | End: 2020-02-27 | Stop reason: HOSPADM

## 2020-02-23 RX ORDER — LEVOFLOXACIN 5 MG/ML
500 INJECTION, SOLUTION INTRAVENOUS ONCE
Status: COMPLETED | OUTPATIENT
Start: 2020-02-23 | End: 2020-02-23

## 2020-02-23 RX ADMIN — Medication 10 ML: at 18:11

## 2020-02-23 RX ADMIN — Medication 10 UNITS: at 08:33

## 2020-02-23 RX ADMIN — CITALOPRAM HYDROBROMIDE 10 MG: 20 TABLET ORAL at 08:32

## 2020-02-23 RX ADMIN — ATORVASTATIN CALCIUM 40 MG: 20 TABLET, FILM COATED ORAL at 20:55

## 2020-02-23 RX ADMIN — Medication 10 ML: at 20:57

## 2020-02-23 RX ADMIN — LEVOFLOXACIN 500 MG: 5 INJECTION, SOLUTION INTRAVENOUS at 20:56

## 2020-02-23 RX ADMIN — PIPERACILLIN SODIUM AND TAZOBACTAM SODIUM 3.38 G: 3; .375 INJECTION, POWDER, LYOPHILIZED, FOR SOLUTION INTRAVENOUS at 11:57

## 2020-02-23 RX ADMIN — IPRATROPIUM BROMIDE AND ALBUTEROL SULFATE 1 AMPULE: .5; 3 SOLUTION RESPIRATORY (INHALATION) at 17:47

## 2020-02-23 RX ADMIN — HYDRALAZINE HYDROCHLORIDE 50 MG: 25 TABLET, FILM COATED ORAL at 20:55

## 2020-02-23 RX ADMIN — IPRATROPIUM BROMIDE AND ALBUTEROL SULFATE 1 AMPULE: .5; 3 SOLUTION RESPIRATORY (INHALATION) at 20:42

## 2020-02-23 RX ADMIN — IPRATROPIUM BROMIDE AND ALBUTEROL SULFATE 1 AMPULE: .5; 3 SOLUTION RESPIRATORY (INHALATION) at 09:10

## 2020-02-23 RX ADMIN — ACETAMINOPHEN 1000 MG: 500 TABLET, FILM COATED ORAL at 08:32

## 2020-02-23 RX ADMIN — HYDRALAZINE HYDROCHLORIDE 50 MG: 25 TABLET, FILM COATED ORAL at 08:32

## 2020-02-23 RX ADMIN — Medication 10 ML: at 03:50

## 2020-02-23 RX ADMIN — IPRATROPIUM BROMIDE AND ALBUTEROL SULFATE 1 AMPULE: .5; 3 SOLUTION RESPIRATORY (INHALATION) at 13:32

## 2020-02-23 RX ADMIN — FUROSEMIDE 20 MG: 10 INJECTION, SOLUTION INTRAMUSCULAR; INTRAVENOUS at 08:32

## 2020-02-23 RX ADMIN — FUROSEMIDE 20 MG: 10 INJECTION, SOLUTION INTRAMUSCULAR; INTRAVENOUS at 18:05

## 2020-02-23 RX ADMIN — Medication 10 ML: at 08:42

## 2020-02-23 RX ADMIN — Medication 1 CAPSULE: at 08:32

## 2020-02-23 RX ADMIN — CITALOPRAM HYDROBROMIDE 10 MG: 20 TABLET ORAL at 20:55

## 2020-02-23 RX ADMIN — AMLODIPINE BESYLATE 5 MG: 5 TABLET ORAL at 08:33

## 2020-02-23 RX ADMIN — SENNOSIDES 17.2 MG: 8.6 TABLET, FILM COATED ORAL at 08:32

## 2020-02-23 RX ADMIN — METOPROLOL TARTRATE 25 MG: 25 TABLET ORAL at 08:33

## 2020-02-23 RX ADMIN — PIPERACILLIN SODIUM AND TAZOBACTAM SODIUM 3.38 G: 3; .375 INJECTION, POWDER, LYOPHILIZED, FOR SOLUTION INTRAVENOUS at 03:50

## 2020-02-23 RX ADMIN — METOPROLOL TARTRATE 25 MG: 25 TABLET ORAL at 20:55

## 2020-02-23 RX ADMIN — ACETAMINOPHEN 1000 MG: 500 TABLET, FILM COATED ORAL at 13:36

## 2020-02-23 RX ADMIN — LISINOPRIL 5 MG: 5 TABLET ORAL at 08:32

## 2020-02-23 RX ADMIN — METRONIDAZOLE 500 MG: 500 INJECTION, SOLUTION INTRAVENOUS at 22:17

## 2020-02-23 RX ADMIN — WARFARIN SODIUM 5 MG: 5 TABLET ORAL at 17:06

## 2020-02-23 RX ADMIN — ACETAMINOPHEN 1000 MG: 500 TABLET, FILM COATED ORAL at 20:55

## 2020-02-23 ASSESSMENT — PAIN SCALES - GENERAL
PAINLEVEL_OUTOF10: 0

## 2020-02-23 NOTE — PROGRESS NOTES
Hospitalist Progress Note  2/23/2020 9:38 AM  Subjective:   Admit Date: 2/22/2020  PCP: Leah Todd    Interval History:   The patient is awake, has no complaints, looks comfortable. He has Broca's aphasia due to previous history of stroke and communication is difficult. He says yes and no to most of my questions. He denies shortness of breath, cough, chest pain, headaches, abdominal pain. His vitals are stable. Diet: DIET LOW SODIUM 2 GM; Medications:   Scheduled Meds:   acetaminophen  1,000 mg Oral TID    piperacillin-tazobactam  3.375 g Intravenous Q8H    ipratropium-albuterol  1 ampule Inhalation Q4H WA    amLODIPine  5 mg Oral Daily    atorvastatin  40 mg Oral Nightly    citalopram  10 mg Oral BID    hydrALAZINE  50 mg Oral TID    insulin glargine  10 Units Subcutaneous BID    metoprolol tartrate  25 mg Oral BID    senna  2 tablet Oral BID    sodium chloride flush  10 mL Intravenous 2 times per day    lisinopril  5 mg Oral Daily    insulin lispro  0-3 Units Subcutaneous Nightly    [START ON 2/18/2021] warfarin (COUMADIN) daily dosing (placeholder)   Other RX Placeholder    lactobacillus  1 capsule Oral Daily with breakfast    furosemide  20 mg Intravenous BID    insulin lispro  0-6 Units Subcutaneous TID WC     Continuous Infusions:   dextrose       PRN Medications: sodium chloride flush, polyethylene glycol, promethazine, ondansetron, glucose, dextrose, glucagon (rDNA), dextrose    Objective:   Vitals: BP (!) 143/55   Pulse 60   Temp 98.5 °F (36.9 °C) (Oral)   Resp 18   Ht 5' 8\" (1.727 m)   Wt 188 lb 3.2 oz (85.4 kg)   SpO2 92%   BMI 28.62 kg/m²   BMI: Body mass index is 28.62 kg/m².     CBC:   Recent Labs     02/22/20  0551 02/23/20  0536   WBC 9.1 8.5   HGB 10.2* 9.9*    331     BMP:    Recent Labs     02/21/20  0548 02/22/20  0551 02/23/20  0536    141 142   K 3.9 4.0 4.2    106 106   CO2 23 23 23   BUN 24* 25* 27*   CREATININE 1.63* 1.64* 1.88*

## 2020-02-23 NOTE — PROGRESS NOTES
Dr Sebastián Watt updated on red/rash like on patient torso and lumbar region. Also updated on change of stool consistency. Orders rec'd.  See Mar.Electronically signed by Berhane Bolden RN on 2/23/2020 at 6:39 PM

## 2020-02-24 LAB
ANION GAP SERPL CALCULATED.3IONS-SCNC: 14 MMOL/L (ref 9–17)
BUN BLDV-MCNC: 26 MG/DL (ref 8–23)
BUN/CREAT BLD: 13 (ref 9–20)
CALCIUM SERPL-MCNC: 9 MG/DL (ref 8.6–10.4)
CHLORIDE BLD-SCNC: 106 MMOL/L (ref 98–107)
CO2: 22 MMOL/L (ref 20–31)
CREAT SERPL-MCNC: 1.93 MG/DL (ref 0.7–1.2)
GFR AFRICAN AMERICAN: 40 ML/MIN
GFR NON-AFRICAN AMERICAN: 33 ML/MIN
GFR SERPL CREATININE-BSD FRML MDRD: ABNORMAL ML/MIN/{1.73_M2}
GFR SERPL CREATININE-BSD FRML MDRD: ABNORMAL ML/MIN/{1.73_M2}
GLUCOSE BLD-MCNC: 101 MG/DL (ref 70–99)
GLUCOSE BLD-MCNC: 133 MG/DL (ref 65–99)
GLUCOSE BLD-MCNC: 141 MG/DL (ref 65–99)
GLUCOSE BLD-MCNC: 154 MG/DL (ref 65–99)
GLUCOSE BLD-MCNC: 87 MG/DL (ref 65–99)
INR BLD: 1.8
POTASSIUM SERPL-SCNC: 4.1 MMOL/L (ref 3.7–5.3)
PROTHROMBIN TIME: 17.6 SEC (ref 9–11.6)
SODIUM BLD-SCNC: 142 MMOL/L (ref 135–144)

## 2020-02-24 PROCEDURE — 92610 EVALUATE SWALLOWING FUNCTION: CPT

## 2020-02-24 PROCEDURE — 94761 N-INVAS EAR/PLS OXIMETRY MLT: CPT

## 2020-02-24 PROCEDURE — 80048 BASIC METABOLIC PNL TOTAL CA: CPT

## 2020-02-24 PROCEDURE — 2580000003 HC RX 258: Performed by: INTERNAL MEDICINE

## 2020-02-24 PROCEDURE — 85610 PROTHROMBIN TIME: CPT

## 2020-02-24 PROCEDURE — 2500000003 HC RX 250 WO HCPCS: Performed by: INTERNAL MEDICINE

## 2020-02-24 PROCEDURE — 6370000000 HC RX 637 (ALT 250 FOR IP): Performed by: INTERNAL MEDICINE

## 2020-02-24 PROCEDURE — 2700000000 HC OXYGEN THERAPY PER DAY

## 2020-02-24 PROCEDURE — 82947 ASSAY GLUCOSE BLOOD QUANT: CPT

## 2020-02-24 PROCEDURE — 36415 COLL VENOUS BLD VENIPUNCTURE: CPT

## 2020-02-24 PROCEDURE — 1200000002 HC SEMI PRIVATE SWING BED

## 2020-02-24 PROCEDURE — 94640 AIRWAY INHALATION TREATMENT: CPT

## 2020-02-24 PROCEDURE — 6360000002 HC RX W HCPCS: Performed by: INTERNAL MEDICINE

## 2020-02-24 PROCEDURE — 99231 SBSQ HOSP IP/OBS SF/LOW 25: CPT | Performed by: INTERNAL MEDICINE

## 2020-02-24 RX ORDER — FUROSEMIDE 20 MG/1
20 TABLET ORAL DAILY
Status: DISCONTINUED | OUTPATIENT
Start: 2020-02-24 | End: 2020-02-27 | Stop reason: HOSPADM

## 2020-02-24 RX ADMIN — LISINOPRIL 5 MG: 5 TABLET ORAL at 09:12

## 2020-02-24 RX ADMIN — IPRATROPIUM BROMIDE AND ALBUTEROL SULFATE 1 AMPULE: .5; 3 SOLUTION RESPIRATORY (INHALATION) at 08:55

## 2020-02-24 RX ADMIN — Medication 10 UNITS: at 21:53

## 2020-02-24 RX ADMIN — Medication 10 UNITS: at 09:22

## 2020-02-24 RX ADMIN — ACETAMINOPHEN 1000 MG: 500 TABLET, FILM COATED ORAL at 14:03

## 2020-02-24 RX ADMIN — METOPROLOL TARTRATE 25 MG: 25 TABLET ORAL at 21:44

## 2020-02-24 RX ADMIN — SENNOSIDES 8.6 MG: 8.6 TABLET, FILM COATED ORAL at 21:45

## 2020-02-24 RX ADMIN — HYDRALAZINE HYDROCHLORIDE 50 MG: 25 TABLET, FILM COATED ORAL at 09:13

## 2020-02-24 RX ADMIN — METOPROLOL TARTRATE 25 MG: 25 TABLET ORAL at 09:12

## 2020-02-24 RX ADMIN — HYDRALAZINE HYDROCHLORIDE 50 MG: 25 TABLET, FILM COATED ORAL at 21:45

## 2020-02-24 RX ADMIN — CITALOPRAM HYDROBROMIDE 10 MG: 20 TABLET ORAL at 09:12

## 2020-02-24 RX ADMIN — HYDRALAZINE HYDROCHLORIDE 50 MG: 25 TABLET, FILM COATED ORAL at 14:03

## 2020-02-24 RX ADMIN — METRONIDAZOLE 500 MG: 500 INJECTION, SOLUTION INTRAVENOUS at 06:29

## 2020-02-24 RX ADMIN — ACETAMINOPHEN 1000 MG: 500 TABLET, FILM COATED ORAL at 09:13

## 2020-02-24 RX ADMIN — INSULIN LISPRO 1 UNITS: 100 INJECTION, SOLUTION INTRAVENOUS; SUBCUTANEOUS at 12:14

## 2020-02-24 RX ADMIN — WARFARIN SODIUM 8 MG: 5 TABLET ORAL at 18:53

## 2020-02-24 RX ADMIN — Medication 1 CAPSULE: at 09:13

## 2020-02-24 RX ADMIN — Medication 10 ML: at 08:35

## 2020-02-24 RX ADMIN — INSULIN LISPRO 1 UNITS: 100 INJECTION, SOLUTION INTRAVENOUS; SUBCUTANEOUS at 17:20

## 2020-02-24 RX ADMIN — SENNOSIDES 17.2 MG: 8.6 TABLET, FILM COATED ORAL at 09:12

## 2020-02-24 RX ADMIN — IPRATROPIUM BROMIDE AND ALBUTEROL SULFATE 1 AMPULE: .5; 3 SOLUTION RESPIRATORY (INHALATION) at 13:05

## 2020-02-24 RX ADMIN — LEVOFLOXACIN 250 MG: 5 INJECTION, SOLUTION INTRAVENOUS at 21:47

## 2020-02-24 RX ADMIN — METRONIDAZOLE 500 MG: 500 INJECTION, SOLUTION INTRAVENOUS at 14:10

## 2020-02-24 RX ADMIN — METRONIDAZOLE 500 MG: 500 INJECTION, SOLUTION INTRAVENOUS at 22:54

## 2020-02-24 RX ADMIN — CITALOPRAM HYDROBROMIDE 10 MG: 20 TABLET ORAL at 21:44

## 2020-02-24 RX ADMIN — FUROSEMIDE 20 MG: 20 TABLET ORAL at 09:12

## 2020-02-24 RX ADMIN — AMLODIPINE BESYLATE 5 MG: 5 TABLET ORAL at 09:13

## 2020-02-24 RX ADMIN — Medication 10 ML: at 21:47

## 2020-02-24 RX ADMIN — ATORVASTATIN CALCIUM 40 MG: 20 TABLET, FILM COATED ORAL at 21:43

## 2020-02-24 ASSESSMENT — PAIN SCALES - GENERAL
PAINLEVEL_OUTOF10: 0
PAINLEVEL_OUTOF10: 3
PAINLEVEL_OUTOF10: 0
PAINLEVEL_OUTOF10: 0

## 2020-02-24 NOTE — PROGRESS NOTES
SW met with pt and spouse to discuss discharge planning. SW provided medicare letter of non coverage for services to end on 2/26/2020 and discharge to home on 2/27/2020. Pt and spouse in agreement with this plan. Spouse signs and copy provided to her and placed on paper chart. Spouse reports no additional discharge needs at this time. She has her home help already back on for his arrival home on Thursday. SW will continue to follow and remain available.  Nathanael Solomon [place] 2/24/2020

## 2020-02-24 NOTE — PROGRESS NOTES
consistencies, effective compensatory strategies, and safe eating environment. Impression  Dysphagia Diagnosis: Swallow function appears grossly intact  Dysphagia Outcome Severity Scale: Level 6: Within functional limits/Modified independence     Treatment Plan  Requires SLP Intervention: No  Duration/Frequency of Treatment: no therapy warranted at this time  D/C Recommendations: To be determined       Recommended Diet and Intervention  Diet Solids Recommendation: Regular(continue wit diet recs per doctor order)  Liquid Consistency Recommendation: Thin  Recommended Form of Meds: Meds in puree     Therapeutic Interventions: Therapeutic PO trials with SLP    Compensatory Swallowing Strategies  Compensatory Swallowing Strategies: Alternate solids and liquids;Small bites/sips;Eat/Feed slowly; Remain upright for 30-45 minutes after meals;Upright as possible for all oral intake    Treatment/Goals  Dysphagia Goals: The patient will tolerate recommended diet without observed clinical signs of aspiration    General  Chart Reviewed: Yes  Behavior/Cognition: Alert; Cooperative  Respiratory Status: O2 via nasual cannula  Communication Observation: Aphasia(pt uses yes no to answer questions and appears accurate with response)  Follows Directions: Simple  Dentition: (dentures)  Patient Positioning: Upright in chair  Baseline Vocal Quality: Normal  Volitional Cough: Strong  Prior Dysphagia History: 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 Pt completd MBS in 2017 with diet upgrade to puree and thin then pt has now been on regular and thin with no reported concerns per wife report (taken from dietician note) and per pt answering yes/no to SLP questions  Consistencies Administered: Reg solid; Thin - straw;Dysphagia Pureed (Dysphagia I)       Vision/Hearing  Vision  Vision: Within Functional Limits  Hearing  Hearing: Within functional limits    Oral Motor Deficits  Oral/Motor  Oral Motor: Exceptions to [facility]  Labial ROM: Reduced right  Labial Symmetry: Abnormal symmetry right(upon movement only)  Labial Strength: Reduced    Oral Phase Dysfunction  Oral Phase  Oral Phase: WFL     Indicators of Pharyngeal Phase Dysfunction   Pharyngeal Phase  Pharyngeal Phase: WFL    Prognosis  Prognosis  Prognosis for safe diet advancement: good  Barriers to reach goals: age  Individuals consulted  Consulted and agree with results and recommendations: Patient;RN    Education  Patient Education: pt education completed with pt and  nursing student Mechelle re: s/s aspiration to monitor for. Discussed hx of Dysphagia and importance to notify care team if concerns arise  Patient Education Response: Verbalizes understanding          Therapy Time  SLP Individual Minutes  Time In: 0915  Time Out: 0930  Minutes: 15        Pt completed BSE this date while upright in bed. Nursing staff stated aggressive behaviors last night, however, pt participated well in BSE with minimal redirections needed. Pt was able to answer yes no questions asked by SLP that appeared to be appropriate. Pt has hx of CVA in 2016 and Dysphagia. Pt completed MBS in 2017. Pt was able to move from NPO to regular and thin diet with no report of concerns reported by pt and wife. Oral mech evaluation revealed mild right labial decreased ROM. Med pass observed and pt tolerated 3-4 pills in applesauce without difficulty and functional transit of pills. Pt's nursing student reported pt ate 100% of his breakfast (pancake, coffee applesauce/yogurt) without difficulty (no additional coughing or throat clearing). Pt completed BSE this date with short bread cookies, applesauce and water viap large straw from water pitcher. Pt consumed consecutive drinks via straw without overt s/s aspiration. Pt tolerated all solid trials well without overt s/s aspiration. There was no change in respiration rate, or vocal quality. Pt has no oral stasis or complaints of food \"stuck\" when asked via yes no questions.     At this time pt appears safe with current diet although rate of intake was quick. Pt shook his head \"no\" when encourages to slow rate of intake. Rec continue with regular and thin diet and additional diet recs from doctor. At this time no therapy warranted.  Please contact SLP if further concerns arise    Ivon Nevarez M.S.,CCC-SLP    2/24/2020 9:38 AM

## 2020-02-24 NOTE — PROGRESS NOTES
Pt remains sitting up in chair with wife on couch. Pt repositioned in chair, denies any needs at this time.

## 2020-02-24 NOTE — PROGRESS NOTES
Clinical Pharmacy Note    Warfarin consult follow-up. Date           INR       Warfarin Dose Given  2/19             1.7              5 mg  2/20             1.9              4 mg  2/21             1.9              4 mg  2/22             1.8              5 mg  2/23             1.7              5 mg  2/24             1.8              8 mg    Hemoglobin (g/dL)   Date Value   02/23/2020 9.9 (L)   02/22/2020 10.2 (L)   02/19/2020 12.1 (L)     Hematocrit (%)   Date Value   02/23/2020 30.5 (L)   02/22/2020 31.1 (L)   02/19/2020 36.3 (L)     Platelets (k/uL)   Date Value   02/23/2020 331   02/22/2020 311   02/19/2020 325         Potential Warfarin Drug-Drug Interactions:  Current drug-drug interactions: APAP 1000 mg 3 times daily (home med), atorvastatin 40 mg nightly (home med), citalopram 10 mg BID (home med), levofloxacin 250 mg IV q24 hrs (day 2), metronidazole 500 mg IV q8h (day 2)  Discontinued drug-drug interactions: zosyn (rash)      Plan:                   INR subtherapeutic today, levaquin/flagyl IV day 2 of therapy which may increase INR, HGb=9.9 on 2/23/2020 and decreasing, will continue to monitor closely, will order warfarin 8 mg tonight. Daily PT/INR ordered until stable and therapeutic. Thank you for the consult. If you have any questions, [place] [phone].   Electronically signed by VINNIE Emmanuel [facility] on 2/24/2020 at 9:39 AM

## 2020-02-24 NOTE — PLAN OF CARE
Problem: Risk for Impaired Skin Integrity  Goal: Tissue integrity - skin and mucous membranes  Description  Structural intactness and normal physiological function of skin and  mucous membranes. 2/24/2020 0510 by Jovana Lake RN  Outcome: Met This Shift  2/23/2020 1651 by Christian Beltre RN  Outcome: Met This Shift     Problem: Falls - Risk of:  Goal: Will remain free from falls  Description  Will remain free from falls  2/24/2020 0510 by Jovana Lake RN  Outcome: Met This Shift  2/23/2020 1651 by Christian Beltre RN  Outcome: Met This Shift  Goal: Absence of physical injury  Description  Absence of physical injury  Outcome: Met This Shift     Problem: Airway Clearance - Ineffective:  Goal: Clear lung sounds  Description  Clear lung sounds  Outcome: Met This Shift  Goal: Ability to maintain a clear airway will improve  Description  Ability to maintain a clear airway will improve  Outcome: Met This Shift     Problem: Fluid Volume - Deficit:  Goal: Achieves intake and output within specified parameters  Description  Achieves intake and output within specified parameters  2/24/2020 0510 by Jovana Lake RN  Outcome: Met This Shift  2/23/2020 1651 by Christian Beltre RN  Outcome: Met This Shift     Problem: Gas Exchange - Impaired:  Goal: Levels of oxygenation will improve  Description  Levels of oxygenation will improve  2/24/2020 0510 by Jovana Lake RN  Outcome: Met This Shift  2/23/2020 1651 by Christian Beltre RN  Outcome: Ongoing     Problem: Cardiac:  Goal: Hemodynamic stability will improve  Description  Hemodynamic stability will improve  2/24/2020 0510 by Jovana Lake RN  Outcome: Met This Shift  2/23/2020 1651 by Christian Beltre RN  Outcome: Ongoing  Intervention: Monitor blood pressure  2/23/2020 1651 by Christian Beltre RN  Note:   Q shift and prn VS obtained.   Goal: Complications related to the disease process, condition or treatment will be avoided or

## 2020-02-24 NOTE — PROGRESS NOTES
Pharmacy Note     Renal Dose Adjustment    Donis Quevedo is a 80 y.o. male. Pharmacist assessment of renally cleared medications. Recent Labs     02/22/20  0551 02/23/20  0536   BUN 25* 27*       Recent Labs     02/22/20  0551 02/23/20  0536   CREATININE 1.64* 1.88*       Estimated Creatinine Clearance: 31 mL/min (A) (based on SCr of 1.88 mg/dL (H)). Height:   Ht Readings from Last 1 Encounters:   02/22/20 5' 8\" (1.727 m)     Weight:  Wt Readings from Last 1 Encounters:   02/23/20 188 lb 3.2 oz (85.4 kg)       The following medication dose has been adjusted based upon renal function per P&T Guidelines:             Levaquin adjusted.   CrCl 20 to 49 mL/minute: Administer 500 mg initial dose, followed by 250 mg every 24 hours    Buck Fuentes Pharm D.  2/23/2020  8:22 PM

## 2020-02-25 ENCOUNTER — APPOINTMENT (OUTPATIENT)
Dept: GENERAL RADIOLOGY | Age: 85
DRG: 194 | End: 2020-02-25
Attending: INTERNAL MEDICINE
Payer: MEDICARE

## 2020-02-25 LAB
ABSOLUTE EOS #: 0.3 K/UL (ref 0–0.4)
ABSOLUTE IMMATURE GRANULOCYTE: ABNORMAL K/UL (ref 0–0.3)
ABSOLUTE LYMPH #: 1 K/UL (ref 1–4.8)
ABSOLUTE MONO #: 0.8 K/UL (ref 0–1)
ANION GAP SERPL CALCULATED.3IONS-SCNC: 15 MMOL/L (ref 9–17)
BASOPHILS # BLD: 0 % (ref 0–2)
BASOPHILS ABSOLUTE: 0 K/UL (ref 0–0.2)
BUN BLDV-MCNC: 23 MG/DL (ref 8–23)
BUN/CREAT BLD: 14 (ref 9–20)
CALCIUM SERPL-MCNC: 8.9 MG/DL (ref 8.6–10.4)
CHLORIDE BLD-SCNC: 105 MMOL/L (ref 98–107)
CO2: 20 MMOL/L (ref 20–31)
CREAT SERPL-MCNC: 1.6 MG/DL (ref 0.7–1.2)
DIFFERENTIAL TYPE: YES
EOSINOPHILS RELATIVE PERCENT: 4 % (ref 0–5)
GFR AFRICAN AMERICAN: 50 ML/MIN
GFR NON-AFRICAN AMERICAN: 41 ML/MIN
GFR SERPL CREATININE-BSD FRML MDRD: ABNORMAL ML/MIN/{1.73_M2}
GFR SERPL CREATININE-BSD FRML MDRD: ABNORMAL ML/MIN/{1.73_M2}
GLUCOSE BLD-MCNC: 100 MG/DL (ref 65–99)
GLUCOSE BLD-MCNC: 146 MG/DL (ref 65–99)
GLUCOSE BLD-MCNC: 177 MG/DL (ref 65–99)
GLUCOSE BLD-MCNC: 81 MG/DL (ref 65–99)
GLUCOSE BLD-MCNC: 98 MG/DL (ref 70–99)
HCT VFR BLD CALC: 30.7 % (ref 41–53)
HEMOGLOBIN: 10 G/DL (ref 13.5–17.5)
IMMATURE GRANULOCYTES: ABNORMAL %
INR BLD: 2.1
LYMPHOCYTES # BLD: 13 % (ref 13–44)
MCH RBC QN AUTO: 30.6 PG (ref 26–34)
MCHC RBC AUTO-ENTMCNC: 32.5 G/DL (ref 31–37)
MCV RBC AUTO: 94 FL (ref 80–100)
MONOCYTES # BLD: 9 % (ref 5–9)
NRBC AUTOMATED: ABNORMAL PER 100 WBC
PDW BLD-RTO: 14.6 % (ref 12.1–15.2)
PLATELET # BLD: 298 K/UL (ref 140–450)
PLATELET ESTIMATE: ABNORMAL
PMV BLD AUTO: ABNORMAL FL (ref 6–12)
POTASSIUM SERPL-SCNC: 3.8 MMOL/L (ref 3.7–5.3)
PROTHROMBIN TIME: 20.6 SEC (ref 9–11.6)
RBC # BLD: 3.27 M/UL (ref 4.5–5.9)
RBC # BLD: ABNORMAL 10*6/UL
SEG NEUTROPHILS: 74 % (ref 39–75)
SEGMENTED NEUTROPHILS ABSOLUTE COUNT: 6 K/UL (ref 2.1–6.5)
SODIUM BLD-SCNC: 140 MMOL/L (ref 135–144)
WBC # BLD: 8.1 K/UL (ref 3.5–11)
WBC # BLD: ABNORMAL 10*3/UL

## 2020-02-25 PROCEDURE — 82947 ASSAY GLUCOSE BLOOD QUANT: CPT

## 2020-02-25 PROCEDURE — 6370000000 HC RX 637 (ALT 250 FOR IP): Performed by: INTERNAL MEDICINE

## 2020-02-25 PROCEDURE — 85610 PROTHROMBIN TIME: CPT

## 2020-02-25 PROCEDURE — 6360000002 HC RX W HCPCS: Performed by: INTERNAL MEDICINE

## 2020-02-25 PROCEDURE — 1200000002 HC SEMI PRIVATE SWING BED

## 2020-02-25 PROCEDURE — 2580000003 HC RX 258: Performed by: INTERNAL MEDICINE

## 2020-02-25 PROCEDURE — 85025 COMPLETE CBC W/AUTO DIFF WBC: CPT

## 2020-02-25 PROCEDURE — 80048 BASIC METABOLIC PNL TOTAL CA: CPT

## 2020-02-25 PROCEDURE — 2700000000 HC OXYGEN THERAPY PER DAY

## 2020-02-25 PROCEDURE — 2500000003 HC RX 250 WO HCPCS: Performed by: INTERNAL MEDICINE

## 2020-02-25 PROCEDURE — 71046 X-RAY EXAM CHEST 2 VIEWS: CPT

## 2020-02-25 PROCEDURE — 36415 COLL VENOUS BLD VENIPUNCTURE: CPT

## 2020-02-25 PROCEDURE — 99231 SBSQ HOSP IP/OBS SF/LOW 25: CPT | Performed by: INTERNAL MEDICINE

## 2020-02-25 PROCEDURE — 94640 AIRWAY INHALATION TREATMENT: CPT

## 2020-02-25 PROCEDURE — 94761 N-INVAS EAR/PLS OXIMETRY MLT: CPT

## 2020-02-25 RX ADMIN — SENNOSIDES 17.2 MG: 8.6 TABLET, FILM COATED ORAL at 09:46

## 2020-02-25 RX ADMIN — CITALOPRAM HYDROBROMIDE 10 MG: 20 TABLET ORAL at 09:46

## 2020-02-25 RX ADMIN — Medication 10 ML: at 21:20

## 2020-02-25 RX ADMIN — SENNOSIDES 17.2 MG: 8.6 TABLET, FILM COATED ORAL at 21:19

## 2020-02-25 RX ADMIN — Medication 10 ML: at 06:32

## 2020-02-25 RX ADMIN — HYDRALAZINE HYDROCHLORIDE 50 MG: 25 TABLET, FILM COATED ORAL at 09:46

## 2020-02-25 RX ADMIN — CITALOPRAM HYDROBROMIDE 10 MG: 20 TABLET ORAL at 21:18

## 2020-02-25 RX ADMIN — IPRATROPIUM BROMIDE AND ALBUTEROL SULFATE 1 AMPULE: .5; 3 SOLUTION RESPIRATORY (INHALATION) at 09:20

## 2020-02-25 RX ADMIN — METRONIDAZOLE 500 MG: 500 INJECTION, SOLUTION INTRAVENOUS at 22:59

## 2020-02-25 RX ADMIN — Medication 10 UNITS: at 09:46

## 2020-02-25 RX ADMIN — LISINOPRIL 5 MG: 5 TABLET ORAL at 09:46

## 2020-02-25 RX ADMIN — WARFARIN SODIUM 6 MG: 5 TABLET ORAL at 18:38

## 2020-02-25 RX ADMIN — METOPROLOL TARTRATE 25 MG: 25 TABLET ORAL at 09:46

## 2020-02-25 RX ADMIN — IPRATROPIUM BROMIDE AND ALBUTEROL SULFATE 1 AMPULE: .5; 3 SOLUTION RESPIRATORY (INHALATION) at 13:04

## 2020-02-25 RX ADMIN — METRONIDAZOLE 500 MG: 500 INJECTION, SOLUTION INTRAVENOUS at 14:00

## 2020-02-25 RX ADMIN — IPRATROPIUM BROMIDE AND ALBUTEROL SULFATE 1 AMPULE: .5; 3 SOLUTION RESPIRATORY (INHALATION) at 16:29

## 2020-02-25 RX ADMIN — INSULIN LISPRO 1 UNITS: 100 INJECTION, SOLUTION INTRAVENOUS; SUBCUTANEOUS at 12:10

## 2020-02-25 RX ADMIN — Medication 1 CAPSULE: at 09:46

## 2020-02-25 RX ADMIN — Medication 10 ML: at 09:46

## 2020-02-25 RX ADMIN — INSULIN LISPRO 1 UNITS: 100 INJECTION, SOLUTION INTRAVENOUS; SUBCUTANEOUS at 21:16

## 2020-02-25 RX ADMIN — FUROSEMIDE 20 MG: 20 TABLET ORAL at 09:46

## 2020-02-25 RX ADMIN — ATORVASTATIN CALCIUM 40 MG: 20 TABLET, FILM COATED ORAL at 21:19

## 2020-02-25 RX ADMIN — METOPROLOL TARTRATE 25 MG: 25 TABLET ORAL at 21:43

## 2020-02-25 RX ADMIN — ACETAMINOPHEN 1000 MG: 500 TABLET, FILM COATED ORAL at 21:19

## 2020-02-25 RX ADMIN — AMLODIPINE BESYLATE 5 MG: 5 TABLET ORAL at 09:46

## 2020-02-25 RX ADMIN — METRONIDAZOLE 500 MG: 500 INJECTION, SOLUTION INTRAVENOUS at 06:32

## 2020-02-25 RX ADMIN — HYDRALAZINE HYDROCHLORIDE 50 MG: 25 TABLET, FILM COATED ORAL at 14:00

## 2020-02-25 RX ADMIN — Medication 10 UNITS: at 21:17

## 2020-02-25 RX ADMIN — LEVOFLOXACIN 250 MG: 5 INJECTION, SOLUTION INTRAVENOUS at 21:20

## 2020-02-25 ASSESSMENT — PAIN SCALES - GENERAL
PAINLEVEL_OUTOF10: 0

## 2020-02-25 NOTE — PLAN OF CARE
Problem: Risk for Impaired Skin Integrity  Goal: Tissue integrity - skin and mucous membranes  Description  Structural intactness and normal physiological function of skin and  mucous membranes.   2/25/2020 0210 by Vee De Jesus RN  Outcome: Ongoing  2/24/2020 1836 by Tyrone Pendleton RN  Outcome: Ongoing     Problem: Falls - Risk of:  Goal: Will remain free from falls  Description  Will remain free from falls  2/25/2020 0210 by Vee De Jesus RN  Outcome: Ongoing  2/24/2020 1836 by Tyrone Pendleton RN  Outcome: Ongoing  Goal: Absence of physical injury  Description  Absence of physical injury  Outcome: Ongoing     Problem: Airway Clearance - Ineffective:  Goal: Clear lung sounds  Description  Clear lung sounds  Outcome: Ongoing  Goal: Ability to maintain a clear airway will improve  Description  Ability to maintain a clear airway will improve  Outcome: Ongoing     Problem: Gas Exchange - Impaired:  Goal: Levels of oxygenation will improve  Description  Levels of oxygenation will improve  Outcome: Ongoing

## 2020-02-25 NOTE — PROGRESS NOTES
Hospitalist Progress Note  2/25/2020 6:30 AM  Subjective:   Admit Date: 2/22/2020  PCP: Jorden Griffiths    Interval History: Donis has no complaints this am.  He denies chest pain or SOB. He states he moved his bowels yesterday. Donis denies nausea. Speech therapy notes reviewed. Diet: DIET LOW SODIUM 2 GM; Carb Control: 4 carb choices (60 gms)/meal  Medications:   Scheduled Meds:   furosemide  20 mg Oral Daily    metroNIDAZOLE  500 mg Intravenous Q8H    levofloxacin  250 mg Intravenous Q24H    acetaminophen  1,000 mg Oral TID    ipratropium-albuterol  1 ampule Inhalation Q4H WA    amLODIPine  5 mg Oral Daily    atorvastatin  40 mg Oral Nightly    citalopram  10 mg Oral BID    hydrALAZINE  50 mg Oral TID    insulin glargine  10 Units Subcutaneous BID    metoprolol tartrate  25 mg Oral BID    senna  2 tablet Oral BID    sodium chloride flush  10 mL Intravenous 2 times per day    lisinopril  5 mg Oral Daily    insulin lispro  0-3 Units Subcutaneous Nightly    [START ON 2/18/2021] warfarin (COUMADIN) daily dosing (placeholder)   Other RX Placeholder    lactobacillus  1 capsule Oral Daily with breakfast    insulin lispro  0-6 Units Subcutaneous TID WC     Continuous Infusions:   dextrose         Patient's current medications documented, reviewed, and updated. CBC:   Recent Labs     02/23/20  0536 02/25/20  0435   WBC 8.5 8.1   HGB 9.9* 10.0*    298     BMP:    Recent Labs     02/23/20  0536 02/24/20  0430 02/25/20  0435    142 140   K 4.2 4.1 3.8    106 105   CO2 23 22 20   BUN 27* 26* 23   CREATININE 1.88* 1.93* 1.60*   GLUCOSE 88 101* 98     Hepatic: No results for input(s): AST, ALT, ALB, BILITOT, ALKPHOS in the last 72 hours. Troponin: No results for input(s): TROPONINI in the last 72 hours. BNP: No results for input(s): BNP in the last 72 hours. Lipids: No results for input(s): CHOL, HDL in the last 72 hours.     Invalid input(s): LDLCALCU  INR:   Recent Labs

## 2020-02-25 NOTE — PROGRESS NOTES
Cardiology    Looks better although still has course cough. Awake and alert. Creatinine better after fluids and decreasing lasix. Will do CXR. Cardiac status stable. Plan per Dr. Galina Pelaez is discharge Thursday.     Phil Allen MD

## 2020-02-26 LAB
GLUCOSE BLD-MCNC: 119 MG/DL (ref 65–99)
GLUCOSE BLD-MCNC: 68 MG/DL (ref 65–99)
GLUCOSE BLD-MCNC: 70 MG/DL (ref 65–99)
INR BLD: 2.4
PROTHROMBIN TIME: 23.4 SEC (ref 9–11.6)

## 2020-02-26 PROCEDURE — 6360000002 HC RX W HCPCS: Performed by: INTERNAL MEDICINE

## 2020-02-26 PROCEDURE — 2580000003 HC RX 258: Performed by: INTERNAL MEDICINE

## 2020-02-26 PROCEDURE — 6370000000 HC RX 637 (ALT 250 FOR IP): Performed by: INTERNAL MEDICINE

## 2020-02-26 PROCEDURE — 2500000003 HC RX 250 WO HCPCS: Performed by: INTERNAL MEDICINE

## 2020-02-26 PROCEDURE — 82947 ASSAY GLUCOSE BLOOD QUANT: CPT

## 2020-02-26 PROCEDURE — 94761 N-INVAS EAR/PLS OXIMETRY MLT: CPT

## 2020-02-26 PROCEDURE — 85610 PROTHROMBIN TIME: CPT

## 2020-02-26 PROCEDURE — 36415 COLL VENOUS BLD VENIPUNCTURE: CPT

## 2020-02-26 PROCEDURE — 94640 AIRWAY INHALATION TREATMENT: CPT

## 2020-02-26 PROCEDURE — 1200000002 HC SEMI PRIVATE SWING BED

## 2020-02-26 PROCEDURE — 2700000000 HC OXYGEN THERAPY PER DAY

## 2020-02-26 RX ORDER — WARFARIN SODIUM 5 MG/1
5 TABLET ORAL
Status: COMPLETED | OUTPATIENT
Start: 2020-02-26 | End: 2020-02-26

## 2020-02-26 RX ADMIN — AMLODIPINE BESYLATE 5 MG: 5 TABLET ORAL at 08:28

## 2020-02-26 RX ADMIN — WARFARIN SODIUM 5 MG: 5 TABLET ORAL at 18:14

## 2020-02-26 RX ADMIN — METRONIDAZOLE 500 MG: 500 INJECTION, SOLUTION INTRAVENOUS at 06:26

## 2020-02-26 RX ADMIN — CITALOPRAM HYDROBROMIDE 10 MG: 20 TABLET ORAL at 08:28

## 2020-02-26 RX ADMIN — ATORVASTATIN CALCIUM 40 MG: 20 TABLET, FILM COATED ORAL at 21:52

## 2020-02-26 RX ADMIN — LEVOFLOXACIN 250 MG: 5 INJECTION, SOLUTION INTRAVENOUS at 21:52

## 2020-02-26 RX ADMIN — SENNOSIDES 17.2 MG: 8.6 TABLET, FILM COATED ORAL at 08:27

## 2020-02-26 RX ADMIN — METOPROLOL TARTRATE 25 MG: 25 TABLET ORAL at 08:28

## 2020-02-26 RX ADMIN — SENNOSIDES 17.2 MG: 8.6 TABLET, FILM COATED ORAL at 21:52

## 2020-02-26 RX ADMIN — IPRATROPIUM BROMIDE AND ALBUTEROL SULFATE 1 AMPULE: .5; 3 SOLUTION RESPIRATORY (INHALATION) at 17:44

## 2020-02-26 RX ADMIN — METOPROLOL TARTRATE 25 MG: 25 TABLET ORAL at 21:52

## 2020-02-26 RX ADMIN — LISINOPRIL 5 MG: 5 TABLET ORAL at 08:28

## 2020-02-26 RX ADMIN — IPRATROPIUM BROMIDE AND ALBUTEROL SULFATE 1 AMPULE: .5; 3 SOLUTION RESPIRATORY (INHALATION) at 20:51

## 2020-02-26 RX ADMIN — HYDRALAZINE HYDROCHLORIDE 50 MG: 25 TABLET, FILM COATED ORAL at 21:52

## 2020-02-26 RX ADMIN — Medication 10 ML: at 21:53

## 2020-02-26 RX ADMIN — CITALOPRAM HYDROBROMIDE 10 MG: 20 TABLET ORAL at 21:52

## 2020-02-26 RX ADMIN — Medication 1 CAPSULE: at 08:27

## 2020-02-26 RX ADMIN — Medication 10 ML: at 14:04

## 2020-02-26 RX ADMIN — FUROSEMIDE 20 MG: 20 TABLET ORAL at 08:28

## 2020-02-26 RX ADMIN — METRONIDAZOLE 500 MG: 500 INJECTION, SOLUTION INTRAVENOUS at 15:07

## 2020-02-26 RX ADMIN — Medication 10 UNITS: at 08:29

## 2020-02-26 RX ADMIN — METRONIDAZOLE 500 MG: 500 INJECTION, SOLUTION INTRAVENOUS at 23:16

## 2020-02-26 RX ADMIN — HYDRALAZINE HYDROCHLORIDE 50 MG: 25 TABLET, FILM COATED ORAL at 08:28

## 2020-02-26 RX ADMIN — HYDRALAZINE HYDROCHLORIDE 50 MG: 25 TABLET, FILM COATED ORAL at 14:03

## 2020-02-26 RX ADMIN — IPRATROPIUM BROMIDE AND ALBUTEROL SULFATE 1 AMPULE: .5; 3 SOLUTION RESPIRATORY (INHALATION) at 09:18

## 2020-02-26 ASSESSMENT — PAIN SCALES - GENERAL
PAINLEVEL_OUTOF10: 0

## 2020-02-26 NOTE — PROGRESS NOTES
Nutrition Assessment    Type and Reason for Visit: Reassess    Nutrition Recommendations:   1. Continue current diet. 2. Encourage slower eating pace at meals. Nutrition Assessment: Continued biting/chewing difficulty aeb needs some foods cut up/moist. PO has been good. +  1 non pitting edema. + BM 2/25. Pt with poor fluid intakes at 320 ml if all intakes recorded. Pt to d/c home tomorrow. Malnutrition Assessment:  · Malnutrition Status: At risk for malnutrition  · Context: Acute illness or injury  · Findings of the 6 clinical characteristics of malnutrition (Minimum of 2 out of 6 clinical characteristics is required to make the diagnosis of moderate or severe Protein Calorie Malnutrition based on AND/ASPEN Guidelines):  1. Energy Intake-Greater than 75% of estimated energy requirement,      2. Weight Loss-No significant weight loss,    3. Fat Loss-No significant subcutaneous fat loss,    4. Muscle Loss-No significant muscle mass loss,    5. Fluid Accumulation-Mild fluid accumulation, Extremities  6.  Strength-Not measured    Nutrition Risk Level:      Nutrient Needs:  · Estimated Daily Total Kcal: 2632-7065(37-83)  · Estimated Daily Protein (g): 84-91(1.2-1.3)  · Estimated Daily Total Fluid (ml/day): 2200    Nutrition Diagnosis:   · Problem: Biting/chewing difficulty  · Etiology: related to Alteration in GI function     Signs and symptoms:  as evidenced by Other (Comment)(historical need for foods to be cut up)    Objective Information:  · Nutrition-Focused Physical Findings: fast eater.   No physical malnutrition indices  · Wound Type: None  · Current Nutrition Therapies:  · Oral Diet Orders: Carb Control 4 Carbs/Meal, 2gm Sodium   · Oral Diet intake: %  · Oral Nutrition Supplement (ONS) Orders: None  · Anthropometric Measures:  · Ht: 5' 8\" (172.7 cm)   · Current Body Wt: 188 lb 3.2 oz (85.4 kg)  · Admission Body Wt: 193 lb 8 oz (87.8 kg)  · Usual Body Wt: (uncertain)  · % Weight Change:  , 2.7% residual acute losses from diuresis  · Ideal Body Wt: 154 lb (69.9 kg), % Ideal Body 122%  · BMI Classification: BMI 25.0 - 29.9 Overweight  Recent Labs     02/24/20  0430 02/25/20  0435    140   K 4.1 3.8    105   CO2 22 20   BUN 26* 23   CREATININE 1.93* 1.60*   GLUCOSE 101* 98   GFR       NOT REPORTED       NOT REPORTED      Lab Results   Component Value Date    LABALBU 3.1 03/25/2017    LABALBU 4.3 03/20/2012      Nutrition Interventions:   Continue current diet  Education Initiated, Coordination of Care, Continued Inpatient Monitoring    Nutrition Evaluation:   · Evaluation: Progressing toward goals   · Goals: PO >75% softer foods and low sodium choices    · Monitoring: Meal Intake, Pertinent Labs, Weight, I&O, Patient/Family Education, Chewing/Swallowing      Electronically signed by Chucho Ga RD, LD on 2/26/20 at 10:07 AM    Contact Number: [phone]

## 2020-02-26 NOTE — PROGRESS NOTES
[facility] Department    Clinical Pharmacy Note    Warfarin consult follow-up    INR (no units)   Date Value   02/26/2020 2.4   02/25/2020 2.1   02/24/2020 1.8   02/23/2020 1.7   02/22/2020 1.8   02/21/2020 1.9   02/20/2020 1.9       Hemoglobin (g/dL)   Date Value   02/25/2020 10.0 (L)   02/23/2020 9.9 (L)   02/22/2020 10.2 (L)     Hematocrit (%)   Date Value   02/25/2020 30.7 (L)   02/23/2020 30.5 (L)   02/22/2020 31.1 (L)     Platelets (k/uL)   Date Value   02/25/2020 298   02/23/2020 331   02/22/2020 311     Target INR Range: 2.0-3.0    Significant Drug-Drug Interactions:  New warfarin drug-drug interactions: none  Discontinued drug-drug interactions: none    Notes:  Pt to receive 5 mg po warfarin tonight. Daily PT/INR until stable within therapeutic range.    Arin Nielsen, PharmD 2/26/2020 3:43 PM

## 2020-02-26 NOTE — PROGRESS NOTES
SUBJECTIVE:                                                    Ely Lott is a 30 year old female who presents to clinic today for the following health issues:      Acute Illness   Acute illness concerns: sore throat  Onset: 7-10 days, worse in the last week    Fever: YES    Chills/Sweats: YES    Headache (location?): YES    Sinus Pressure:YES    Conjunctivitis:  no    Ear Pain: no    Rhinorrhea: YES    Congestion: YES    Sore Throat: YES     Cough: YES    Wheeze: no    Decreased Appetite: yes    Nausea: yes    Vomiting: no    Diarrhea:  no    Dysuria/Freq.: no    Fatigue/Achiness: YES    Sick/Strep Exposure: no     Therapies Tried and outcome: na      Problem list and histories reviewed & adjusted, as indicated.  Additional history: as documented    Patient Active Problem List   Diagnosis     Routine general medical examination at a health care facility     ASCUS favor benign     History reviewed. No pertinent past surgical history.    Social History   Substance Use Topics     Smoking status: Never Smoker     Smokeless tobacco: Never Used     Alcohol use Yes      Comment: Occasional     Family History   Problem Relation Age of Onset     DIABETES Father          Current Outpatient Prescriptions   Medication Sig Dispense Refill     Cyclobenzaprine HCl (FLEXERIL PO) Take by mouth At Bedtime       ibuprofen (ADVIL,MOTRIN) 600 MG tablet Take 1 tablet by mouth every 8 hours as needed for pain. 100 tablet 3     Problem list, Medication list, Allergies, and Medical/Social/Surgical histories reviewed in EPIC and updated as appropriate.    ROS:  Constitutional, HEENT, cardiovascular, pulmonary, gi and gu systems are negative, except as otherwise noted.    OBJECTIVE:                                                    /89 (BP Location: Right arm, Patient Position: Chair, Cuff Size: Adult Regular)  Pulse 103  Temp 97.6  F (36.4  C) (Oral)  Wt 135 lb (61.2 kg)  SpO2 98%  Breastfeeding? No  BMI 22.47 kg/m2  Body mass  index is 22.47 kg/(m^2).  GENERAL: healthy, alert and no distress  EYES: Eyes grossly normal to inspection, PERRL and conjunctivae and sclerae normal  HENT: normal cephalic/atraumatic, ear canals and TM's normal, nasal mucosa edematous , oral mucous membranes moist and sinuses: maxillary tenderness on bilateral  NECK: no adenopathy, no asymmetry, masses, or scars and thyroid normal to palpation  RESP: lungs clear to auscultation - no rales, rhonchi or wheezes  CV: regular rate and rhythm, normal S1 S2, no S3 or S4, no murmur, click or rub, no peripheral edema and peripheral pulses strong  ABDOMEN: soft, nontender, no hepatosplenomegaly, no masses and bowel sounds normal  MS: no gross musculoskeletal defects noted, no edema    Diagnostic Test Results:  Results for orders placed or performed in visit on 02/16/17 (from the past 24 hour(s))   Rapid strep screen   Result Value Ref Range    Specimen Description Throat     Rapid Strep A Screen       NEGATIVE: No Group A streptococcal antigen detected by immunoassay, await   culture report.      Micro Report Status FINAL 02/16/2017         ASSESSMENT/PLAN:                                                        ICD-10-CM    1. Acute sinusitis with symptoms > 10 days J01.90 doxycycline Monohydrate 100 MG CAPS   2. Throat pain R07.0 Rapid strep screen     Doxycycline 100 mg twice a day for 10 days  Increase fluids  Nasal wash  Mucinex DM  F/u if not better after the antibiotic course      Josiane Mitchell PA-C  [facility]     ongoing condition for which the individual received inpatient care in a hospital.    Physician signature certifies patient continued need for SNF inpatient care.

## 2020-02-27 ENCOUNTER — TELEPHONE (OUTPATIENT)
Dept: CARDIOLOGY CLINIC | Age: 85
End: 2020-02-27

## 2020-02-27 VITALS
SYSTOLIC BLOOD PRESSURE: 147 MMHG | DIASTOLIC BLOOD PRESSURE: 61 MMHG | RESPIRATION RATE: 16 BRPM | BODY MASS INDEX: 28.52 KG/M2 | TEMPERATURE: 98.5 F | OXYGEN SATURATION: 90 % | WEIGHT: 188.2 LBS | HEIGHT: 68 IN | HEART RATE: 66 BPM

## 2020-02-27 LAB
ANION GAP SERPL CALCULATED.3IONS-SCNC: 13 MMOL/L (ref 9–17)
BUN BLDV-MCNC: 21 MG/DL (ref 8–23)
BUN/CREAT BLD: 13 (ref 9–20)
CALCIUM SERPL-MCNC: 9.2 MG/DL (ref 8.6–10.4)
CHLORIDE BLD-SCNC: 105 MMOL/L (ref 98–107)
CO2: 22 MMOL/L (ref 20–31)
CREAT SERPL-MCNC: 1.61 MG/DL (ref 0.7–1.2)
GFR AFRICAN AMERICAN: 50 ML/MIN
GFR NON-AFRICAN AMERICAN: 41 ML/MIN
GFR SERPL CREATININE-BSD FRML MDRD: ABNORMAL ML/MIN/{1.73_M2}
GFR SERPL CREATININE-BSD FRML MDRD: ABNORMAL ML/MIN/{1.73_M2}
GLUCOSE BLD-MCNC: 85 MG/DL (ref 65–99)
GLUCOSE BLD-MCNC: 96 MG/DL (ref 70–99)
INR BLD: 2.6
POTASSIUM SERPL-SCNC: 4 MMOL/L (ref 3.7–5.3)
PROTHROMBIN TIME: 25.3 SEC (ref 9–11.6)
SODIUM BLD-SCNC: 140 MMOL/L (ref 135–144)

## 2020-02-27 PROCEDURE — 82947 ASSAY GLUCOSE BLOOD QUANT: CPT

## 2020-02-27 PROCEDURE — 2500000003 HC RX 250 WO HCPCS: Performed by: INTERNAL MEDICINE

## 2020-02-27 PROCEDURE — 94761 N-INVAS EAR/PLS OXIMETRY MLT: CPT

## 2020-02-27 PROCEDURE — 80048 BASIC METABOLIC PNL TOTAL CA: CPT

## 2020-02-27 PROCEDURE — 6370000000 HC RX 637 (ALT 250 FOR IP): Performed by: INTERNAL MEDICINE

## 2020-02-27 PROCEDURE — 36415 COLL VENOUS BLD VENIPUNCTURE: CPT

## 2020-02-27 PROCEDURE — 85610 PROTHROMBIN TIME: CPT

## 2020-02-27 PROCEDURE — 2700000000 HC OXYGEN THERAPY PER DAY

## 2020-02-27 PROCEDURE — 2580000003 HC RX 258: Performed by: INTERNAL MEDICINE

## 2020-02-27 PROCEDURE — 94640 AIRWAY INHALATION TREATMENT: CPT

## 2020-02-27 RX ORDER — WARFARIN SODIUM 5 MG/1
5 TABLET ORAL
Status: DISCONTINUED | OUTPATIENT
Start: 2020-02-27 | End: 2020-02-27 | Stop reason: HOSPADM

## 2020-02-27 RX ORDER — LEVOFLOXACIN 250 MG/1
250 TABLET ORAL DAILY
Qty: 3 TABLET | Refills: 0 | Status: SHIPPED | OUTPATIENT
Start: 2020-02-27 | End: 2020-03-01

## 2020-02-27 RX ORDER — LISINOPRIL 5 MG/1
5 TABLET ORAL DAILY
Qty: 30 TABLET | Refills: 3 | Status: SHIPPED | OUTPATIENT
Start: 2020-02-27

## 2020-02-27 RX ADMIN — METOPROLOL TARTRATE 25 MG: 25 TABLET ORAL at 08:52

## 2020-02-27 RX ADMIN — IPRATROPIUM BROMIDE AND ALBUTEROL SULFATE 1 AMPULE: .5; 3 SOLUTION RESPIRATORY (INHALATION) at 05:35

## 2020-02-27 RX ADMIN — METRONIDAZOLE 500 MG: 500 INJECTION, SOLUTION INTRAVENOUS at 06:23

## 2020-02-27 RX ADMIN — FUROSEMIDE 20 MG: 20 TABLET ORAL at 08:52

## 2020-02-27 RX ADMIN — AMLODIPINE BESYLATE 5 MG: 5 TABLET ORAL at 08:52

## 2020-02-27 RX ADMIN — LISINOPRIL 5 MG: 5 TABLET ORAL at 08:52

## 2020-02-27 RX ADMIN — CITALOPRAM HYDROBROMIDE 10 MG: 20 TABLET ORAL at 08:52

## 2020-02-27 RX ADMIN — HYDRALAZINE HYDROCHLORIDE 50 MG: 25 TABLET, FILM COATED ORAL at 08:52

## 2020-02-27 RX ADMIN — Medication 10 ML: at 08:53

## 2020-02-27 RX ADMIN — SENNOSIDES 17.2 MG: 8.6 TABLET, FILM COATED ORAL at 08:52

## 2020-02-27 RX ADMIN — Medication 1 CAPSULE: at 08:52

## 2020-02-27 RX ADMIN — Medication 10 UNITS: at 08:53

## 2020-02-27 ASSESSMENT — PAIN SCALES - GENERAL
PAINLEVEL_OUTOF10: 0
PAINLEVEL_OUTOF10: 0

## 2020-02-27 NOTE — PROGRESS NOTES
[facility]    Clinical Pharmacy Note    Warfarin consult follow-up    INR (no units)   Date Value   02/27/2020 2.6   02/26/2020 2.4   02/25/2020 2.1   02/24/2020 1.8   02/23/2020 1.7   02/22/2020 1.8   02/21/2020 1.9       Hemoglobin (g/dL)   Date Value   02/25/2020 10.0 (L)   02/23/2020 9.9 (L)   02/22/2020 10.2 (L)     Hematocrit (%)   Date Value   02/25/2020 30.7 (L)   02/23/2020 30.5 (L)   02/22/2020 31.1 (L)     Platelets (k/uL)   Date Value   02/25/2020 298   02/23/2020 331   02/22/2020 311       Target INR Range: 2.0-3.0    Significant Drug-Drug Interactions:  New warfarin drug-drug interactions: no changes, continues levofloxacin IV  Discontinued drug-drug interactions: no changes      Notes: Patient to take warfarin 5mg po today. Daily PT/INR until stable within therapeutic range.    Thank you,  Ester Jones, R.Ph., 3/16/9901,8:51 AM

## 2020-02-27 NOTE — PROGRESS NOTES
Notified [facility] LSW of Patient discharge to his home today. Informed of potential need for home O2 and LSW states that Patient will have Face to Face meeting with their medical director tomorrow to see if he meets criteria to receive full benefit of [facility]. If so [facility] will provide home O2 for Patient. [facility] stated [facility] [place] for [facility] in order for [facility] to [facility]. Patient states, per nsg., that he will not wear home O2 if ordered.     [place], [place]  2/27/2020

## 2020-02-27 NOTE — PROGRESS NOTES
Hospitalist Progress Note  2/27/2020 5:50 AM  Subjective:   Admit Date: 2/22/2020  PCP: Chelsea Padilla History: Donis denies any complaints this am.  He denies chest pain or SOB. He was off oxygen most the day yesterday. Eating well, no nausea / vomiting. Labs reviewed this am.  He is excited about going home. Diet: DIET LOW SODIUM 2 GM; Carb Control: 4 carb choices (60 gms)/meal  Medications:   Scheduled Meds:   furosemide  20 mg Oral Daily    metroNIDAZOLE  500 mg Intravenous Q8H    levofloxacin  250 mg Intravenous Q24H    acetaminophen  1,000 mg Oral TID    ipratropium-albuterol  1 ampule Inhalation Q4H WA    amLODIPine  5 mg Oral Daily    atorvastatin  40 mg Oral Nightly    citalopram  10 mg Oral BID    hydrALAZINE  50 mg Oral TID    insulin glargine  10 Units Subcutaneous BID    metoprolol tartrate  25 mg Oral BID    senna  2 tablet Oral BID    sodium chloride flush  10 mL Intravenous 2 times per day    lisinopril  5 mg Oral Daily    insulin lispro  0-3 Units Subcutaneous Nightly    [START ON 2/18/2021] warfarin (COUMADIN) daily dosing (placeholder)   Other RX Placeholder    lactobacillus  1 capsule Oral Daily with breakfast    insulin lispro  0-6 Units Subcutaneous TID WC     Continuous Infusions:   dextrose         Patient's current medications documented, reviewed, and updated. CBC:   Recent Labs     02/25/20  0435   WBC 8.1   HGB 10.0*        BMP:    Recent Labs     02/25/20  0435 02/27/20  0445    140   K 3.8 4.0    105   CO2 20 22   BUN 23 21   CREATININE 1.60* 1.61*   GLUCOSE 98 96     Hepatic: No results for input(s): AST, ALT, ALB, BILITOT, ALKPHOS in the last 72 hours. Troponin: No results for input(s): TROPONINI in the last 72 hours. BNP: No results for input(s): BNP in the last 72 hours. Lipids: No results for input(s): CHOL, HDL in the last 72 hours.     Invalid input(s): LDLCALCU  INR:   Recent Labs     02/25/20  0435 02/26/20  0430 02/27/20  0445   INR 2.1 2.4 2.6         Objective:   Vitals: BP (!) 144/60   Pulse 90   Temp 98.2 °F (36.8 °C) (Oral)   Resp 16   Ht 5' 8\" (1.727 m)   Wt 188 lb 3.2 oz (85.4 kg)   SpO2 93%   BMI 28.62 kg/m²   General appearance: alert and cooperative with exam  HEENT: Head: Normocephalic, no lesions, without obvious abnormality. Eye: Normal external eye, conjunctiva, lids cornea, YVES. Nose: Normal external nose, mucus membranes and septum. Nose: Nasal oxygen on. Neck: no adenopathy and supple, symmetrical, trachea midline  Lungs: clear to auscultation bilaterally  Heart: regular rate and rhythm, S1, S2 normal and II/VI systolic murmur. Abdomen: soft, non-tender; bowel sounds normal; no masses,  no organomegaly  Extremities: extremities normal, atraumatic, no cyanosis or edema  Neurologic: Mental status: Alert and answers with yes / no. Assessment and Plan:   1. Pneumonia - IV antibiotic changed to Levaquin / Flagyl after he developed a rash on Zosyn.  Speech did not feel there was a risk of aspiration. 2. Pleural effusions (moderate sized) - Dr. Ady Carlos changed Lasix back to his normal home dose on 2/24. 3. UTI - E.coli sensitive to Cipro. 4. CKD stage III - creatinine appears to be at baseline this am.  5. Moderate AS / Moderate MR.  6. H/O Atrial fib - on coumadin with pharmacy managing. 7. H/O CVA in 2016 with right side weakness and aphasia.    8. DM II - insulin dependant.  On Humalog sliding scale. 9. HTN - stable.      Plan:  1. Discharge home today. 2.  Will evaluate to see if he need oxygen at home.       Patient Active Problem List:     Mixed hyperlipidemia     Essential hypertension, benign     Acute ischemic left MCA stroke (HCC)     Right flaccid hemiplegia (HCC)     Dysarthria     Global aphasia     Hypertensive emergency     Ex-smoker     Type 2 diabetes mellitus with complication (HCC)     TENZIN (acute kidney injury) ([facility] [address]     Cerebral edema (HCC)     Acute respiratory failure

## 2020-02-27 NOTE — DISCHARGE SUMMARY
Hospitalist Discharge Summary    Craig Paula  :  1934  MRN:  [id number]    Admit date:  2020  Discharge date:  20    Admitting Physician:  Jonny Tejada MD    Discharge Diagnoses:   Pneumonia. Pleural effusions. UTI - E.coli sensitive to Cipro. CKD stage III. Moderate AS / MR    Admission Condition:  poor      Discharged Condition:  good    Hospital Course: The patient is a 80 y. o. male presented to the emergency room complaining of severe shortness of breath for the past 1-2 days.    The patient has a history of ischemic left MCA stroke in 2016 resulting with right-sided weakness and Broca's aphasia.       Apparently EMS evaluation revealed patient's oxygen saturation 88% on room air.  The patient was transferred to the emergency room for evaluation. He is initial vitals showed oxygen saturation 90%, blood pressure was 180/58, respirations 24, heart rate 70.  BMP revealed normal electrolytes, BUN was elevated 26 and creatinine 1.62, calcium was 9.3.  Troponin was 0.04, proBNP elevated 1, 907.  CBC revealed normal white count of 9.8, H&H 12.1/36. 3.  EKG revealed normal sinus rhythm, rate of 67, no acute changes.  Chest x-ray showed increased congestion and mixed pulmonary opacities bilaterally worse in the left base, evidence of CHF and moderate edema.  UA revealed 20-50 WBCs, positive nitrate and 1+ leukocyte esterase, 4+ bacteria.  It was believed that the patient was in CHF and for this reason he was deemed appropriate for admission.   He was started on IV Lasix.  He was also started on IV Rocephin for UTI. Bran Bush was seen in consultation by Dr. Evelina Baum.  2D echo was done and revealed normal LV size, moderate to severe LV hypertrophy, systolic function normal with an estimated ejection fraction of 55%, moderately dilated right ventricular cavity, mild to moderate aortic insufficiency, moderate mitral regurgitation, moderate to severe TR, moderate AS.  The patient's edema  Neurologic: Mental status: Alert and answers with yes / no. Discharge Medications:       Elise Woodall   Home Medication Instructions OVN:[id number]    Printed on:02/27/20 2872   Medication Information                      acetaminophen (TYLENOL) 500 MG tablet  Take 1,000 mg by mouth 3 times daily             amLODIPine (NORVASC) 5 MG tablet  Take 1 tablet by mouth daily             atorvastatin (LIPITOR) 40 MG tablet  Take 1 tablet by mouth nightly             citalopram (CELEXA) 10 MG tablet  Take 10 mg by mouth 2 times daily              furosemide (LASIX) 20 MG tablet  Take 20 mg by mouth daily             hydrALAZINE (APRESOLINE) 50 MG tablet  Take 50 mg by mouth 3 times daily             insulin glargine (LANTUS) 100 UNIT/ML injection vial  Inject into the skin See Admin Instructions Take 10 units sub q in AM  Take 10 units sub q in PM             ipratropium-albuterol (DUONEB) 0.5-2.5 (3) MG/3ML SOLN nebulizer solution  Inhale 3 mLs into the lungs every 6 hours as needed for Shortness of Breath             levoFLOXacin (LEVAQUIN) 250 MG tablet  Take 1 tablet by mouth daily for 3 days             lisinopril (PRINIVIL;ZESTRIL) 5 MG tablet  Take 1 tablet by mouth daily             metoprolol tartrate (LOPRESSOR) 25 MG tablet  Take 1 tablet by mouth 2 times daily             senna (SENOKOT) 8.6 MG tablet  Take 2 tablets by mouth 2 times daily             warfarin (COUMADIN) 5 MG tablet  Take 5 mg by mouth See Admin Instructions 2.5MG  Monday Wednesday and Friday, 5mg all other days                 Consults:  Speech therapy, Dr. Chyna Broussard (Cardiology). Significant Diagnostic Studies:  Labs. Treatments:   IV Levaquin / Flagyl, IV Lasix. Disposition:   Home. Discharge Instructions:  Resume previous home medication but decrease Metoprolol to 25 mg two times a day. Take Lisinopril 5 mg daily. Take Levaquin 250 mg daily x 3 days.   Pharmacy to determine the dose of coumadin and INR follow up at discharge. Follow up with Dr. Connie Black as directed by him at discharge. Resume hospice services. Activity:  As tolerated. Diet:  Cardiac    Follow up with Will Mccarty in 1 week. Discharge time =  25 minutes.      Signed:  Eduard Shoemaker  2/27/2020, 6:11 AM

## 2020-04-22 ENCOUNTER — HOSPITAL ENCOUNTER (EMERGENCY)
Age: 85
Discharge: HOME | End: 2020-04-22
Attending: INTERNAL MEDICINE
Payer: MEDICARE

## 2020-04-22 VITALS
DIASTOLIC BLOOD PRESSURE: 44 MMHG | WEIGHT: 190 LBS | SYSTOLIC BLOOD PRESSURE: 132 MMHG | RESPIRATION RATE: 20 BRPM | OXYGEN SATURATION: 94 % | HEIGHT: 70 IN | BODY MASS INDEX: 27.2 KG/M2 | HEART RATE: 43 BPM | TEMPERATURE: 98.7 F

## 2020-04-22 LAB
ABSOLUTE EOS #: 0.2 K/UL (ref 0–0.4)
ABSOLUTE IMMATURE GRANULOCYTE: ABNORMAL K/UL (ref 0–0.3)
ABSOLUTE LYMPH #: 1.7 K/UL (ref 1–4.8)
ABSOLUTE MONO #: 0.7 K/UL (ref 0–1)
ALBUMIN SERPL-MCNC: 4.1 G/DL (ref 3.5–5.2)
ALBUMIN/GLOBULIN RATIO: ABNORMAL (ref 1–2.5)
ALP BLD-CCNC: 84 U/L (ref 40–129)
ALT SERPL-CCNC: 28 U/L (ref 5–41)
ANION GAP SERPL CALCULATED.3IONS-SCNC: 13 MMOL/L (ref 9–17)
AST SERPL-CCNC: 25 U/L
BASOPHILS # BLD: 0 % (ref 0–2)
BASOPHILS ABSOLUTE: 0 K/UL (ref 0–0.2)
BILIRUB SERPL-MCNC: 0.51 MG/DL (ref 0.3–1.2)
BUN BLDV-MCNC: 29 MG/DL (ref 8–23)
BUN/CREAT BLD: 17 (ref 9–20)
CALCIUM SERPL-MCNC: 9.6 MG/DL (ref 8.6–10.4)
CHLORIDE BLD-SCNC: 106 MMOL/L (ref 98–107)
CO2: 19 MMOL/L (ref 20–31)
CREAT SERPL-MCNC: 1.71 MG/DL (ref 0.7–1.2)
DIFFERENTIAL TYPE: YES
EOSINOPHILS RELATIVE PERCENT: 2 % (ref 0–5)
GFR AFRICAN AMERICAN: 46 ML/MIN
GFR NON-AFRICAN AMERICAN: 38 ML/MIN
GFR SERPL CREATININE-BSD FRML MDRD: ABNORMAL ML/MIN/{1.73_M2}
GFR SERPL CREATININE-BSD FRML MDRD: ABNORMAL ML/MIN/{1.73_M2}
GLUCOSE BLD-MCNC: 188 MG/DL (ref 70–99)
HCT VFR BLD CALC: 36.2 % (ref 41–53)
HEMOGLOBIN: 12.1 G/DL (ref 13.5–17.5)
IMMATURE GRANULOCYTES: ABNORMAL %
INR BLD: 2
LYMPHOCYTES # BLD: 20 % (ref 13–44)
MCH RBC QN AUTO: 30.6 PG (ref 26–34)
MCHC RBC AUTO-ENTMCNC: 33.5 G/DL (ref 31–37)
MCV RBC AUTO: 91.4 FL (ref 80–100)
MONOCYTES # BLD: 8 % (ref 5–9)
NRBC AUTOMATED: ABNORMAL PER 100 WBC
PDW BLD-RTO: 16 % (ref 12.1–15.2)
PLATELET # BLD: 349 K/UL (ref 140–450)
PLATELET ESTIMATE: ABNORMAL
PMV BLD AUTO: ABNORMAL FL (ref 6–12)
POTASSIUM SERPL-SCNC: 4.8 MMOL/L (ref 3.7–5.3)
PROTHROMBIN TIME: 19.4 SEC (ref 9–11.6)
RBC # BLD: 3.96 M/UL (ref 4.5–5.9)
RBC # BLD: ABNORMAL 10*6/UL
SEG NEUTROPHILS: 70 % (ref 39–75)
SEGMENTED NEUTROPHILS ABSOLUTE COUNT: 6.2 K/UL (ref 2.1–6.5)
SODIUM BLD-SCNC: 138 MMOL/L (ref 135–144)
TOTAL PROTEIN: 7.7 G/DL (ref 6.4–8.3)
WBC # BLD: 8.8 K/UL (ref 3.5–11)
WBC # BLD: ABNORMAL 10*3/UL

## 2020-04-22 PROCEDURE — 80053 COMPREHEN METABOLIC PANEL: CPT

## 2020-04-22 PROCEDURE — 99283 EMERGENCY DEPT VISIT LOW MDM: CPT

## 2020-04-22 PROCEDURE — 85610 PROTHROMBIN TIME: CPT

## 2020-04-22 PROCEDURE — 85025 COMPLETE CBC W/AUTO DIFF WBC: CPT

## 2020-04-22 RX ORDER — BISACODYL 10 MG
10 SUPPOSITORY, RECTAL RECTAL DAILY PRN
COMMUNITY
End: 2020-05-22

## 2020-04-23 ENCOUNTER — CARE COORDINATION (OUTPATIENT)
Dept: CARE COORDINATION | Age: 85
End: 2020-04-23

## 2020-05-22 ENCOUNTER — APPOINTMENT (OUTPATIENT)
Dept: GENERAL RADIOLOGY | Age: 85
DRG: 291 | End: 2020-05-22
Payer: MEDICARE

## 2020-05-22 ENCOUNTER — HOSPITAL ENCOUNTER (INPATIENT)
Age: 85
LOS: 2 days | Discharge: HOME | DRG: 291 | End: 2020-05-24
Attending: EMERGENCY MEDICINE | Admitting: INTERNAL MEDICINE
Payer: MEDICARE

## 2020-05-22 PROBLEM — I50.9 CHF DUE TO VALVULAR DISEASE (HCC): Status: ACTIVE | Noted: 2020-05-22

## 2020-05-22 PROBLEM — I50.33 ACUTE ON CHRONIC DIASTOLIC CONGESTIVE HEART FAILURE (HCC): Status: ACTIVE | Noted: 2020-05-22

## 2020-05-22 PROBLEM — I38 CHF DUE TO VALVULAR DISEASE (HCC): Status: ACTIVE | Noted: 2020-05-22

## 2020-05-22 LAB
-: NORMAL
ABSOLUTE EOS #: 0.2 K/UL (ref 0–0.4)
ABSOLUTE IMMATURE GRANULOCYTE: ABNORMAL K/UL (ref 0–0.3)
ABSOLUTE LYMPH #: 2 K/UL (ref 1–4.8)
ABSOLUTE MONO #: 0.6 K/UL (ref 0–1)
ALBUMIN SERPL-MCNC: 4.2 G/DL (ref 3.5–5.2)
ALBUMIN/GLOBULIN RATIO: ABNORMAL (ref 1–2.5)
ALP BLD-CCNC: 95 U/L (ref 40–129)
ALT SERPL-CCNC: 25 U/L (ref 5–41)
AMORPHOUS: NORMAL
ANION GAP SERPL CALCULATED.3IONS-SCNC: 10 MMOL/L (ref 9–17)
AST SERPL-CCNC: 20 U/L
BACTERIA: NORMAL
BASOPHILS # BLD: 0 % (ref 0–2)
BASOPHILS ABSOLUTE: 0 K/UL (ref 0–0.2)
BILIRUB SERPL-MCNC: 0.72 MG/DL (ref 0.3–1.2)
BILIRUBIN URINE: NEGATIVE
BNP INTERPRETATION: ABNORMAL
BUN BLDV-MCNC: 29 MG/DL (ref 8–23)
BUN/CREAT BLD: 19 (ref 9–20)
C DIFF AG + TOXIN: ABNORMAL
CALCIUM SERPL-MCNC: 9.4 MG/DL (ref 8.6–10.4)
CASTS UA: NORMAL /LPF
CHLORIDE BLD-SCNC: 108 MMOL/L (ref 98–107)
CO2: 19 MMOL/L (ref 20–31)
COLOR: YELLOW
COMMENT UA: ABNORMAL
CREAT SERPL-MCNC: 1.49 MG/DL (ref 0.7–1.2)
CRYSTALS, UA: NORMAL /HPF
D-DIMER QUANTITATIVE: 1.01 MG/L FEU (ref 0–0.5)
DIFFERENTIAL TYPE: YES
EOSINOPHILS RELATIVE PERCENT: 1 % (ref 0–5)
EPITHELIAL CELLS UA: NORMAL /HPF
ESTIMATED AVERAGE GLUCOSE: 137 MG/DL
GFR AFRICAN AMERICAN: 54 ML/MIN
GFR NON-AFRICAN AMERICAN: 45 ML/MIN
GFR SERPL CREATININE-BSD FRML MDRD: ABNORMAL ML/MIN/{1.73_M2}
GFR SERPL CREATININE-BSD FRML MDRD: ABNORMAL ML/MIN/{1.73_M2}
GLUCOSE BLD-MCNC: 106 MG/DL (ref 65–99)
GLUCOSE BLD-MCNC: 111 MG/DL (ref 65–99)
GLUCOSE BLD-MCNC: 170 MG/DL (ref 65–99)
GLUCOSE BLD-MCNC: 178 MG/DL (ref 65–99)
GLUCOSE BLD-MCNC: 205 MG/DL (ref 70–99)
GLUCOSE URINE: NEGATIVE
HBA1C MFR BLD: 6.4 % (ref 4.8–5.9)
HCT VFR BLD CALC: 34.8 % (ref 41–53)
HEMOGLOBIN: 11.3 G/DL (ref 13.5–17.5)
IMMATURE GRANULOCYTES: ABNORMAL %
INR BLD: 2.2
KETONES, URINE: NEGATIVE
LACTIC ACID, WHOLE BLOOD: NORMAL MMOL/L (ref 0.7–2.1)
LACTIC ACID: 1.1 MMOL/L (ref 0.5–2.2)
LEUKOCYTE ESTERASE, URINE: NEGATIVE
LYMPHOCYTES # BLD: 12 % (ref 13–44)
MCH RBC QN AUTO: 30.7 PG (ref 26–34)
MCHC RBC AUTO-ENTMCNC: 32.5 G/DL (ref 31–37)
MCV RBC AUTO: 94.7 FL (ref 80–100)
MONOCYTES # BLD: 4 % (ref 5–9)
MUCUS: NORMAL
NITRITE, URINE: NEGATIVE
NRBC AUTOMATED: ABNORMAL PER 100 WBC
OTHER OBSERVATIONS UA: NORMAL
PDW BLD-RTO: 17 % (ref 12.1–15.2)
PH UA: 5 (ref 5–8)
PLATELET # BLD: 392 K/UL (ref 140–450)
PLATELET ESTIMATE: ABNORMAL
PMV BLD AUTO: ABNORMAL FL (ref 6–12)
POTASSIUM SERPL-SCNC: 5.1 MMOL/L (ref 3.7–5.3)
PRO-BNP: 2672 PG/ML
PROTEIN UA: ABNORMAL
PROTHROMBIN TIME: 21.4 SEC (ref 9–11.6)
RBC # BLD: 3.68 M/UL (ref 4.5–5.9)
RBC # BLD: ABNORMAL 10*6/UL
RBC UA: NORMAL /HPF (ref 0–2)
RENAL EPITHELIAL, UA: NORMAL /HPF
SARS-COV-2, PCR: NORMAL
SARS-COV-2, RAPID: NORMAL
SARS-COV-2: NOT DETECTED
SEG NEUTROPHILS: 83 % (ref 39–75)
SEGMENTED NEUTROPHILS ABSOLUTE COUNT: 13.1 K/UL (ref 2.1–6.5)
SODIUM BLD-SCNC: 137 MMOL/L (ref 135–144)
SOURCE: NORMAL
SPECIFIC GRAVITY UA: 1.01 (ref 1–1.03)
SPECIMEN DESCRIPTION: ABNORMAL
TOTAL PROTEIN: 7.8 G/DL (ref 6.4–8.3)
TRICHOMONAS: NORMAL
TROPONIN INTERP: ABNORMAL
TROPONIN T: 0.03 NG/ML
TROPONIN T: 0.04 NG/ML
TROPONIN T: 0.05 NG/ML
TROPONIN T: 0.05 NG/ML
TROPONIN, HIGH SENSITIVITY: ABNORMAL NG/L (ref 0–22)
TURBIDITY: CLEAR
URINE HGB: NEGATIVE
UROBILINOGEN, URINE: NORMAL
WBC # BLD: 15.9 K/UL (ref 3.5–11)
WBC # BLD: ABNORMAL 10*3/UL
WBC UA: NORMAL /HPF
YEAST: NORMAL

## 2020-05-22 PROCEDURE — 51798 US URINE CAPACITY MEASURE: CPT

## 2020-05-22 PROCEDURE — 85379 FIBRIN DEGRADATION QUANT: CPT

## 2020-05-22 PROCEDURE — 87324 CLOSTRIDIUM AG IA: CPT

## 2020-05-22 PROCEDURE — 2500000003 HC RX 250 WO HCPCS: Performed by: EMERGENCY MEDICINE

## 2020-05-22 PROCEDURE — 1200000000 HC SEMI PRIVATE

## 2020-05-22 PROCEDURE — U0003 INFECTIOUS AGENT DETECTION BY NUCLEIC ACID (DNA OR RNA); SEVERE ACUTE RESPIRATORY SYNDROME CORONAVIRUS 2 (SARS-COV-2) (CORONAVIRUS DISEASE [COVID-19]), AMPLIFIED PROBE TECHNIQUE, MAKING USE OF HIGH THROUGHPUT TECHNOLOGIES AS DESCRIBED BY CMS-2020-01-R: HCPCS

## 2020-05-22 PROCEDURE — 2580000003 HC RX 258: Performed by: INTERNAL MEDICINE

## 2020-05-22 PROCEDURE — 71045 X-RAY EXAM CHEST 1 VIEW: CPT

## 2020-05-22 PROCEDURE — 99285 EMERGENCY DEPT VISIT HI MDM: CPT

## 2020-05-22 PROCEDURE — 83880 ASSAY OF NATRIURETIC PEPTIDE: CPT

## 2020-05-22 PROCEDURE — 87449 NOS EACH ORGANISM AG IA: CPT

## 2020-05-22 PROCEDURE — 83605 ASSAY OF LACTIC ACID: CPT

## 2020-05-22 PROCEDURE — 84484 ASSAY OF TROPONIN QUANT: CPT

## 2020-05-22 PROCEDURE — 87493 C DIFF AMPLIFIED PROBE: CPT

## 2020-05-22 PROCEDURE — 94660 CPAP INITIATION&MGMT: CPT

## 2020-05-22 PROCEDURE — 85610 PROTHROMBIN TIME: CPT

## 2020-05-22 PROCEDURE — 94664 DEMO&/EVAL PT USE INHALER: CPT

## 2020-05-22 PROCEDURE — 96375 TX/PRO/DX INJ NEW DRUG ADDON: CPT

## 2020-05-22 PROCEDURE — 6360000002 HC RX W HCPCS: Performed by: EMERGENCY MEDICINE

## 2020-05-22 PROCEDURE — 94761 N-INVAS EAR/PLS OXIMETRY MLT: CPT

## 2020-05-22 PROCEDURE — 81001 URINALYSIS AUTO W/SCOPE: CPT

## 2020-05-22 PROCEDURE — 2500000003 HC RX 250 WO HCPCS: Performed by: INTERNAL MEDICINE

## 2020-05-22 PROCEDURE — 85025 COMPLETE CBC W/AUTO DIFF WBC: CPT

## 2020-05-22 PROCEDURE — 2700000000 HC OXYGEN THERAPY PER DAY

## 2020-05-22 PROCEDURE — 96365 THER/PROPH/DIAG IV INF INIT: CPT

## 2020-05-22 PROCEDURE — 6360000002 HC RX W HCPCS: Performed by: INTERNAL MEDICINE

## 2020-05-22 PROCEDURE — 80053 COMPREHEN METABOLIC PANEL: CPT

## 2020-05-22 PROCEDURE — 83036 HEMOGLOBIN GLYCOSYLATED A1C: CPT

## 2020-05-22 PROCEDURE — 6370000000 HC RX 637 (ALT 250 FOR IP): Performed by: EMERGENCY MEDICINE

## 2020-05-22 PROCEDURE — 82947 ASSAY GLUCOSE BLOOD QUANT: CPT

## 2020-05-22 PROCEDURE — 36415 COLL VENOUS BLD VENIPUNCTURE: CPT

## 2020-05-22 PROCEDURE — 6370000000 HC RX 637 (ALT 250 FOR IP): Performed by: INTERNAL MEDICINE

## 2020-05-22 PROCEDURE — 93005 ELECTROCARDIOGRAM TRACING: CPT | Performed by: EMERGENCY MEDICINE

## 2020-05-22 PROCEDURE — 51702 INSERT TEMP BLADDER CATH: CPT

## 2020-05-22 PROCEDURE — 6370000000 HC RX 637 (ALT 250 FOR IP)

## 2020-05-22 RX ORDER — NITROGLYCERIN 0.4 MG/1
TABLET SUBLINGUAL
Status: COMPLETED
Start: 2020-05-22 | End: 2020-05-22

## 2020-05-22 RX ORDER — ATORVASTATIN CALCIUM 40 MG/1
40 TABLET, FILM COATED ORAL NIGHTLY
Status: DISCONTINUED | OUTPATIENT
Start: 2020-05-22 | End: 2020-05-24 | Stop reason: HOSPADM

## 2020-05-22 RX ORDER — CITALOPRAM 20 MG/1
20 TABLET ORAL 2 TIMES DAILY
Status: DISCONTINUED | OUTPATIENT
Start: 2020-05-22 | End: 2020-05-24 | Stop reason: HOSPADM

## 2020-05-22 RX ORDER — DEXTROSE MONOHYDRATE 25 G/50ML
12.5 INJECTION, SOLUTION INTRAVENOUS PRN
Status: DISCONTINUED | OUTPATIENT
Start: 2020-05-22 | End: 2020-05-24 | Stop reason: HOSPADM

## 2020-05-22 RX ORDER — DEXTROSE MONOHYDRATE 50 MG/ML
100 INJECTION, SOLUTION INTRAVENOUS PRN
Status: DISCONTINUED | OUTPATIENT
Start: 2020-05-22 | End: 2020-05-24 | Stop reason: HOSPADM

## 2020-05-22 RX ORDER — SODIUM CHLORIDE 0.9 % (FLUSH) 0.9 %
10 SYRINGE (ML) INJECTION 2 TIMES DAILY
Status: DISCONTINUED | OUTPATIENT
Start: 2020-05-22 | End: 2020-05-24 | Stop reason: HOSPADM

## 2020-05-22 RX ORDER — AMLODIPINE BESYLATE 5 MG/1
5 TABLET ORAL DAILY
Status: DISCONTINUED | OUTPATIENT
Start: 2020-05-22 | End: 2020-05-24 | Stop reason: HOSPADM

## 2020-05-22 RX ORDER — NICOTINE POLACRILEX 4 MG
15 LOZENGE BUCCAL PRN
Status: DISCONTINUED | OUTPATIENT
Start: 2020-05-22 | End: 2020-05-24 | Stop reason: HOSPADM

## 2020-05-22 RX ORDER — FUROSEMIDE 10 MG/ML
40 INJECTION INTRAMUSCULAR; INTRAVENOUS 2 TIMES DAILY
Status: DISCONTINUED | OUTPATIENT
Start: 2020-05-22 | End: 2020-05-23

## 2020-05-22 RX ORDER — IPRATROPIUM BROMIDE AND ALBUTEROL SULFATE 2.5; .5 MG/3ML; MG/3ML
3 SOLUTION RESPIRATORY (INHALATION) EVERY 6 HOURS PRN
Status: DISCONTINUED | OUTPATIENT
Start: 2020-05-22 | End: 2020-05-24 | Stop reason: HOSPADM

## 2020-05-22 RX ORDER — WARFARIN SODIUM 2.5 MG/1
2.5 TABLET ORAL
Status: COMPLETED | OUTPATIENT
Start: 2020-05-22 | End: 2020-05-22

## 2020-05-22 RX ORDER — INSULIN GLARGINE 100 [IU]/ML
10 INJECTION, SOLUTION SUBCUTANEOUS 2 TIMES DAILY
Status: DISCONTINUED | OUTPATIENT
Start: 2020-05-22 | End: 2020-05-24 | Stop reason: HOSPADM

## 2020-05-22 RX ORDER — LISINOPRIL 5 MG/1
5 TABLET ORAL DAILY
Status: DISCONTINUED | OUTPATIENT
Start: 2020-05-22 | End: 2020-05-24 | Stop reason: HOSPADM

## 2020-05-22 RX ORDER — NITROGLYCERIN 0.4 MG/1
0.4 TABLET SUBLINGUAL EVERY 5 MIN PRN
Status: DISCONTINUED | OUTPATIENT
Start: 2020-05-22 | End: 2020-05-24 | Stop reason: HOSPADM

## 2020-05-22 RX ORDER — FUROSEMIDE 10 MG/ML
40 INJECTION INTRAMUSCULAR; INTRAVENOUS ONCE
Status: COMPLETED | OUTPATIENT
Start: 2020-05-22 | End: 2020-05-22

## 2020-05-22 RX ORDER — SENNA PLUS 8.6 MG/1
2 TABLET ORAL 2 TIMES DAILY
Status: DISCONTINUED | OUTPATIENT
Start: 2020-05-22 | End: 2020-05-24 | Stop reason: HOSPADM

## 2020-05-22 RX ORDER — BISACODYL 10 MG
10 SUPPOSITORY, RECTAL RECTAL DAILY PRN
Status: DISCONTINUED | OUTPATIENT
Start: 2020-05-22 | End: 2020-05-24 | Stop reason: HOSPADM

## 2020-05-22 RX ORDER — LEVOFLOXACIN 5 MG/ML
250 INJECTION, SOLUTION INTRAVENOUS EVERY 24 HOURS
Status: DISCONTINUED | OUTPATIENT
Start: 2020-05-22 | End: 2020-05-24 | Stop reason: HOSPADM

## 2020-05-22 RX ORDER — NITROGLYCERIN 20 MG/100ML
20 INJECTION INTRAVENOUS CONTINUOUS
Status: DISCONTINUED | OUTPATIENT
Start: 2020-05-22 | End: 2020-05-24 | Stop reason: HOSPADM

## 2020-05-22 RX ORDER — IPRATROPIUM BROMIDE AND ALBUTEROL SULFATE 2.5; .5 MG/3ML; MG/3ML
1 SOLUTION RESPIRATORY (INHALATION) ONCE
Status: COMPLETED | OUTPATIENT
Start: 2020-05-22 | End: 2020-05-22

## 2020-05-22 RX ORDER — HYDRALAZINE HYDROCHLORIDE 25 MG/1
50 TABLET, FILM COATED ORAL 3 TIMES DAILY
Status: DISCONTINUED | OUTPATIENT
Start: 2020-05-22 | End: 2020-05-24 | Stop reason: HOSPADM

## 2020-05-22 RX ADMIN — CITALOPRAM HYDROBROMIDE 20 MG: 20 TABLET ORAL at 20:54

## 2020-05-22 RX ADMIN — HYDRALAZINE HYDROCHLORIDE 50 MG: 25 TABLET, FILM COATED ORAL at 21:01

## 2020-05-22 RX ADMIN — SENNOSIDES 17.2 MG: 8.6 TABLET, FILM COATED ORAL at 10:40

## 2020-05-22 RX ADMIN — METOPROLOL TARTRATE 25 MG: 25 TABLET, FILM COATED ORAL at 20:54

## 2020-05-22 RX ADMIN — FUROSEMIDE 40 MG: 10 INJECTION, SOLUTION INTRAMUSCULAR; INTRAVENOUS at 06:25

## 2020-05-22 RX ADMIN — HYDRALAZINE HYDROCHLORIDE 50 MG: 25 TABLET, FILM COATED ORAL at 10:40

## 2020-05-22 RX ADMIN — NITROGLYCERIN 0.4 MG: 0.4 TABLET SUBLINGUAL at 06:00

## 2020-05-22 RX ADMIN — LEVOFLOXACIN 250 MG: 5 INJECTION, SOLUTION INTRAVENOUS at 10:37

## 2020-05-22 RX ADMIN — METRONIDAZOLE 500 MG: 500 INJECTION, SOLUTION INTRAVENOUS at 17:40

## 2020-05-22 RX ADMIN — Medication 10 ML: at 20:58

## 2020-05-22 RX ADMIN — LISINOPRIL 5 MG: 5 TABLET ORAL at 10:40

## 2020-05-22 RX ADMIN — INSULIN GLARGINE 10 UNITS: 100 INJECTION, SOLUTION SUBCUTANEOUS at 10:40

## 2020-05-22 RX ADMIN — METRONIDAZOLE 500 MG: 500 INJECTION, SOLUTION INTRAVENOUS at 10:37

## 2020-05-22 RX ADMIN — METOPROLOL TARTRATE 25 MG: 25 TABLET, FILM COATED ORAL at 10:40

## 2020-05-22 RX ADMIN — Medication 10 ML: at 10:30

## 2020-05-22 RX ADMIN — ATORVASTATIN CALCIUM 40 MG: 40 TABLET, FILM COATED ORAL at 20:54

## 2020-05-22 RX ADMIN — FUROSEMIDE 40 MG: 10 INJECTION, SOLUTION INTRAMUSCULAR; INTRAVENOUS at 10:41

## 2020-05-22 RX ADMIN — SENNOSIDES 17.2 MG: 8.6 TABLET, FILM COATED ORAL at 20:54

## 2020-05-22 RX ADMIN — FUROSEMIDE 40 MG: 10 INJECTION, SOLUTION INTRAMUSCULAR; INTRAVENOUS at 17:40

## 2020-05-22 RX ADMIN — CITALOPRAM HYDROBROMIDE 20 MG: 20 TABLET ORAL at 10:39

## 2020-05-22 RX ADMIN — WARFARIN SODIUM 2.5 MG: 2.5 TABLET ORAL at 17:41

## 2020-05-22 RX ADMIN — IPRATROPIUM BROMIDE AND ALBUTEROL SULFATE 1 AMPULE: .5; 3 SOLUTION RESPIRATORY (INHALATION) at 05:55

## 2020-05-22 RX ADMIN — INSULIN GLARGINE 10 UNITS: 100 INJECTION, SOLUTION SUBCUTANEOUS at 20:55

## 2020-05-22 RX ADMIN — AMLODIPINE BESYLATE 5 MG: 5 TABLET ORAL at 10:39

## 2020-05-22 ASSESSMENT — PAIN SCALES - GENERAL
PAINLEVEL_OUTOF10: 0

## 2020-05-22 NOTE — ED PROVIDER NOTES
Denies chest pain, palpitations or swelling   GI:  Denies abdominal pain, nausea, vomiting, or diarrhea   Musculoskeletal:  Denies back pain   Skin:  Denies rash   Neurologic:  Denies headache, focal weakness or sensory changes   Endocrine:  Denies polyuria or polydypsia   Lymphatic:  Denies swollen glands   Psychiatric:  Denies depression, suicidal ideation or homicidal ideation   All systems negative except as marked. PHYSICAL EXAM    VITAL SIGNS: BP (!) 139/52   Pulse (!) 49   Temp 99.6 °F (37.6 °C) (Oral)   Resp 24   Wt 196 lb 6.4 oz (89.1 kg)   SpO2 99%   BMI 28.18 kg/m²    Constitutional: Ill-appearing male with  severe shortness of breath and respiratory distress   hENT:  Normocephalic, Atraumatic, Bilateral external ears normal, Oropharynx moist, No oral exudates, Nose normal. Neck- Normal range of motion, No tenderness, Supple, No stridor. Eyes:  PERRL, EOMI, Conjunctiva normal, No discharge. Respiratory: Labored respirations,  bilaterally with wheezes bilaterally and rhonchi bilaterally. Bibasilar rales. Cardiovascular:  Normal heart rate, Normal rhythm, No murmurs, No rubs, No gallops. GI:  Bowel sounds normal, Soft, No tenderness, No masses, No pulsatile masses. : External genitalia appear normal, No masses or lesions. No discharge. No CVA tenderness. Musculoskeletal: Right-sided paralysis due to previous stroke integument:  Warm, Dry, No erythema, No rash. Lymphatic:  No lymphadenopathy noted. Neurologic: Patient is awake. The patient answers questions appropriately. Patient has right upper extremity paralysis due to previous stroke  Psychiatric:  Affect normal, Judgment normal, Mood normal.     EKG    Sinus rhythm rate of 58, no acute ST changes    RADIOLOGY    XR CHEST PORTABLE   Final Result      1. CHF with interstitial vascular congestion and bilateral pleural effusions. 2. Bilateral lower lung atelectasis.              PROCEDURES        Labs  Labs Reviewed   CBC WITH AUTO DIFFERENTIAL - Abnormal; Notable for the following components:       Result Value    WBC 15.9 (*)     RBC 3.68 (*)     Hemoglobin 11.3 (*)     Hematocrit 34.8 (*)     RDW 17.0 (*)     Seg Neutrophils 83 (*)     Lymphocytes 12 (*)     Monocytes 4 (*)     Segs Absolute 13.10 (*)     All other components within normal limits   COMPREHENSIVE METABOLIC PANEL - Abnormal; Notable for the following components:    Glucose 205 (*)     BUN 29 (*)     CREATININE 1.49 (*)     Chloride 108 (*)     CO2 19 (*)     GFR Non- 45 (*)     GFR  54 (*)     All other components within normal limits   TROPONIN - Abnormal; Notable for the following components:    Troponin T 0.03 (*)     All other components within normal limits   BRAIN NATRIURETIC PEPTIDE - Abnormal; Notable for the following components:    Pro-BNP 2,672 (*)     All other components within normal limits   D-DIMER, QUANTITATIVE - Abnormal; Notable for the following components:    D-Dimer, Quant 1.01 (*)     All other components within normal limits   PROTIME-INR - Abnormal; Notable for the following components:    Protime 21.4 (*)     All other components within normal limits   LACTIC ACID, PLASMA   URINALYSIS             Summation  Critical care 30 minutes    Patient Course: Patient was started on BiPAP on arrival.  Patient was started on nitroglycerin sublingual and drip on arrival.  Patient was given Lasix 40 IV. Clinically patient improved. Blood pressure is improved. Patient is discussed with Dr. Toni Banks. Patient will be admitted for further evaluation and treatment.   ED Medications administered this visit:    Medications   nitroGLYCERIN (NITROSTAT) SL tablet 0.4 mg (0.4 mg Sublingual Given 5/22/20 0600)   nitroGLYCERIN 50 mg in dextrose 5% 250 mL infusion (0 mcg/min Intravenous Stopped 5/22/20 0737)   ipratropium-albuterol (DUONEB) nebulizer solution 1 ampule (1 ampule Inhalation Given 5/22/20 0555)   furosemide (LASIX)

## 2020-05-22 NOTE — PROGRESS NOTES
hobbies/housework; intake normal or reduced; occasional assist; LOC full/confusion  ___50%  Mainly sit/lie; Extensive disease; Can't do any work; Considerable assist; intake normal or reduced; LOC full/confusion  ___40%  Mainly in bed; Extensive disease; Mainly assist; intake normal or reduced; LOC full/confusion   _x__30%  Bed Bound; Extensive disease; Total care; intake reduced; LOC full/confusion  ___20%  Bed Bound; Extensive disease; Total care; intake minimal; Drowsy/coma  ___10%  Bed Bound; Extensive disease; Total care; Mouth care only; Drowsy/coma  ___0       Death    Readmission Risk:  19%    Notes:  Donis is resting in bed for our discussion via phone due to COVID 19 restrictions. He presented to the emergency department with SOB. Donis has a history of stroke in 2016 and HTN. Donis lives at home with his wife. He is dependent for his ADL's. Spouse states that she has 4 women whom she private pays to provide care 10 hours each day. He also has \"[facility] and [facility]. They have a hospital bed, wheelchair, walker, shower chair, grab bars in the bathroom and a raised toilet seat. [facility] also [facility] and other supplies like depends and Chux. Spouse states that they have adequate support at home and have no needs at this time. Discussed advanced directives and COVID ACP discussion complete. Donis is currently a DNR CC. Spouse states that he does not have a living will or a DPOAHC on file. Donis is only able to answer yes or no questions due to his stroke. Agrees to his wife as primary decision maker with his daughter as secondary. Spouse is in agreement. Both deny further needs at this time. Will continue to follow and support.      Palliative Care Plan:  Education/support to family  Education/support to patient  Caregiver support/education  Continue with current plan of care  Palliative Care Goals:  provide comfort care/support/palliation/relieve suffering, remain at home and strengthening relationships  Visit focus:  Routine meeting  Discuss goals of care  Listen to patient/family concerns  Build trust  Provide emotional support to the family  Elicit patient's goals and values, and use these to establish or modify goals of care     Dayron Zafar Nurse Coordinator  5/22/2020 1:53 PM

## 2020-05-22 NOTE — ACP (ADVANCE CARE PLANNING)
Advance Care Planning     Advance Care Planning Activator (Inpatient)  Conversation Note      Date of ACP Conversation: 5/22/2020    Conversation Conducted with: Patient with Decision Making Capacity    ACP Activator: Ceferino TaylorWhen Decision Maker makes decisions on behalf of the incapacitated patient: Decision Maker is asked to consider and make decisions based on patient values, known preferences, or best interests. Health Care Decision Maker:     If no Authorized Decision Maker has previously been identified, then patient chooses [place]:  \"Who would you like to name as your primary health care decision-maker? \"               Name: Julio Byers        Relationship: Spouse          Phone number: [phone]  Verlon Mimes this person be reached easily? \" Yes  \"Who would you like to name as your back-up decision maker? \"   Name: Sita Rivera        Relationship: Daughter          Phone number: [phone]  Verlon Mimes this person be reached easily? \" Yes    Care Preferences    Ventilation: \"If you were in your present state of health and suddenly became very ill and were unable to breathe on your own, what would your preference be about the use of a ventilator (breathing machine) if it were available to you? \"      Would the patient desire the use of ventilator (breathing machine)?: no    \"If your health worsens and it becomes clear that your chance of recovery is unlikely, what would your preference be about the use of a ventilator (breathing machine) if it were available to you? \"     Would the patient desire the use of ventilator (breathing machine)?: No      Resuscitation  \"CPR works best to restart the heart when there is a sudden event, like a heart attack, in someone who is otherwise healthy. Unfortunately, CPR does not typically restart the heart for people who have serious health conditions or who are very sick. \"    \"In the event your heart stopped as a result of an underlying serious health condition, would you want attempts to be made to restart your heart (answer \"yes\" for attempt to resuscitate) or would you prefer a natural death (answer \"no\" for do not attempt to resuscitate)? \" no       [x] Yes   [] No   Educated Patient / Lukaszblas Thompson regarding differences between Advance Directives and portable DNR orders.     Length of ACP Conversation in minutes:      Conversation Outcomes:  [x] ACP discussion completed  [] Existing advance directive reviewed with patient; no changes to patient's previously recorded wishes  [] New Advance Directive completed  [] Portable Do Not Rescitate prepared for Provider review and signature  [] POLST/POST/MOLST/MOST prepared for Provider review and signature      Follow-up plan:    [] Schedule follow-up conversation to continue planning  [] Referred individual to Provider for additional questions/concerns   [] Advised patient/agent/surrogate to review completed ACP document and update if needed with changes in condition, patient preferences or care setting    [] This note routed to one or more involved healthcare providers

## 2020-05-22 NOTE — PROGRESS NOTES
Nutrition Assessment    Type and Reason for Visit: Initial    Nutrition Recommendations:  Encourage PO     Nutrition Assessment: Altered nutrition related lab indices r/t endocrine dysfunction, AEB hyperglycemia with known diabetes of good control per A1C. Good PO demonstrated. Prior education for low sodium, but unknown execution pta. Is currently undergoing COVID testting, so had limited exposure to patient. Malnutrition Assessment:  · Malnutrition Status: At risk for malnutrition  · Context: Acute illness or injury  · Findings of the 6 clinical characteristics of malnutrition (Minimum of 2 out of 6 clinical characteristics is required to make the diagnosis of moderate or severe Protein Calorie Malnutrition based on AND/ASPEN Guidelines):  1. Energy Intake-Greater than 75% of estimated energy requirement,      2. Weight Loss-No significant weight loss,    3. Fat Loss-No significant subcutaneous fat loss,    4. Muscle Loss-No significant muscle mass loss,    5. Fluid Accumulation-Unable to assess(not yet assessed),    6.  Strength-Not measured    Nutrition Risk Level:  Moderate    Nutrient Needs:  · Estimated Daily Total Kcal: 2556-2502(33-58)  · Estimated Daily Protein (g): 91-98(1.2-1.3)  · Estimated Daily Total Fluid (ml/day): 2100    Nutrition Diagnosis:   · Problem: Altered nutrition-related lab values  · Etiology: related to Endocrine dysfunction     Signs and symptoms:  as evidenced by Lab values    Objective Information:  · Nutrition-Focused Physical Findings: no malnutrition indices  · Wound Type: None  · Current Nutrition Therapies:  · Oral Diet Orders: 2gm Sodium   · Oral Diet intake: %  · Oral Nutrition Supplement (ONS) Orders: None  · Anthropometric Measures:  · Ht: 5' 10\" (177.8 cm)   · Current Body Wt: 196 lb 6.4 oz (89.1 kg)  · Admission Body Wt: 196 lb 6.4 oz (89.1 kg)  · Usual Body Wt: 190 lb (86.2 kg)  · % Weight Change:  ,  3.3% acute gains   · Ideal Body Wt: 166 lb (75.3 kg), % Ideal Body 118%  · BMI Classification: BMI 25.0 - 29.9 Overweight    Lab Results   Component Value Date     05/22/2020    K 5.1 05/22/2020     (H) 05/22/2020    CO2 19 (L) 05/22/2020    BUN 29 (H) 05/22/2020    CREATININE 1.49 (H) 05/22/2020    GLUCOSE 205 (H) 05/22/2020    CALCIUM 9.4 05/22/2020    PROT 7.8 05/22/2020    LABALBU 4.2 05/22/2020    BILITOT 0.72 05/22/2020    ALKPHOS 95 05/22/2020    AST 20 05/22/2020    ALT 25 05/22/2020    LABGLOM 45 (L) 05/22/2020    GFRAA 54 (L) 05/22/2020     Lab Results   Component Value Date    LABA1C 6.4 (H) 05/22/2020     Lab Results   Component Value Date     05/22/2020     Nutrition Interventions:   Modify current diet  Continued Inpatient Monitoring, Education not appropriate at this time, Coordination of Care    Nutrition Evaluation:   · Evaluation: Goals set   · Goals: PO >75% on low sodium choices    · Monitoring: Meal Intake, Weight, I&O, Pertinent Labs, Patient/Family Education    Electronically signed by Jeanne Starr RD, LD on 5/22/20 at 11:00 AM EDT    Contact Number: [phone]

## 2020-05-22 NOTE — PROGRESS NOTES
Spoke with Patient's wife, Earlene Lopez, re:  discharge planning as Patient under precautions to rule out Covid. Patient is an 80year old , white male, admitted with a diagnosis of Acute on Chronic Diastolic CHF. Patient also has a history of CVA and has some residual effects of this including speech difficulties and upper body weakness. Patient's wife states that plan for his discharge is to return home to private residence where private duty caregivers assist him daily. Patient lives in [facility] with his wife. She reports that Patient uses a hospital bed, wheelchair, shower chair, elevated toilet seat, grab bars and a walker as needed for assistance. Patient receives daily care from private duty aides who wife has hired. Patient also receives '[facility]' of [facility] at this time which includes monitoring by an RN every 2 weeks. Patient is a  and a [address] [place] [address] nurse also visits his home to draw blood work monthly. Patient is dependent for his ADL's. With assistance of the aides wife has been providing for his limited transportation needs. PCP is Dr. Ebonie Johnston. Wife reports that [address] [place] [address] provides for Patient medication and they have minimal out of pocket expense related to his medication. Discharge plan is home with his wife and existing services when deemed medically stable. Patient has a '[facility]' order in place and is noted to have a Living Will on file. Spoke with Hospice RN today re:  His plan for home and have left a voicemail message for [facility] as well. This writer to monitor for further needs and assist as they arise.     Elena. René Dial[address]  5/22/2020

## 2020-05-23 ENCOUNTER — APPOINTMENT (OUTPATIENT)
Dept: GENERAL RADIOLOGY | Age: 85
DRG: 291 | End: 2020-05-23
Payer: MEDICARE

## 2020-05-23 LAB
ABSOLUTE EOS #: 0 K/UL (ref 0–0.4)
ABSOLUTE IMMATURE GRANULOCYTE: ABNORMAL K/UL (ref 0–0.3)
ABSOLUTE LYMPH #: 0.9 K/UL (ref 1–4.8)
ABSOLUTE MONO #: 0.9 K/UL (ref 0–1)
ANION GAP SERPL CALCULATED.3IONS-SCNC: 8 MMOL/L (ref 9–17)
BASOPHILS # BLD: 0 % (ref 0–2)
BASOPHILS ABSOLUTE: 0 K/UL (ref 0–0.2)
BUN BLDV-MCNC: 32 MG/DL (ref 8–23)
BUN/CREAT BLD: 19 (ref 9–20)
C DIFFICILE TOXINS, PCR: ABNORMAL
CALCIUM SERPL-MCNC: 9.2 MG/DL (ref 8.6–10.4)
CHLORIDE BLD-SCNC: 109 MMOL/L (ref 98–107)
CO2: 22 MMOL/L (ref 20–31)
CREAT SERPL-MCNC: 1.67 MG/DL (ref 0.7–1.2)
DIFFERENTIAL TYPE: YES
EOSINOPHILS RELATIVE PERCENT: 0 % (ref 0–5)
GFR AFRICAN AMERICAN: 48 ML/MIN
GFR NON-AFRICAN AMERICAN: 39 ML/MIN
GFR SERPL CREATININE-BSD FRML MDRD: ABNORMAL ML/MIN/{1.73_M2}
GFR SERPL CREATININE-BSD FRML MDRD: ABNORMAL ML/MIN/{1.73_M2}
GLUCOSE BLD-MCNC: 126 MG/DL (ref 70–99)
GLUCOSE BLD-MCNC: 151 MG/DL (ref 65–99)
GLUCOSE BLD-MCNC: 177 MG/DL (ref 65–99)
GLUCOSE BLD-MCNC: 95 MG/DL (ref 65–99)
HCT VFR BLD CALC: 29 % (ref 41–53)
HEMOGLOBIN: 9.7 G/DL (ref 13.5–17.5)
IMMATURE GRANULOCYTES: ABNORMAL %
INR BLD: 2.1
LYMPHOCYTES # BLD: 8 % (ref 13–44)
MAGNESIUM: 2 MG/DL (ref 1.6–2.6)
MCH RBC QN AUTO: 31.2 PG (ref 26–34)
MCHC RBC AUTO-ENTMCNC: 33.3 G/DL (ref 31–37)
MCV RBC AUTO: 93.6 FL (ref 80–100)
MONOCYTES # BLD: 8 % (ref 5–9)
NRBC AUTOMATED: ABNORMAL PER 100 WBC
PDW BLD-RTO: 16.8 % (ref 12.1–15.2)
PLATELET # BLD: 294 K/UL (ref 140–450)
PLATELET ESTIMATE: ABNORMAL
PMV BLD AUTO: ABNORMAL FL (ref 6–12)
POTASSIUM SERPL-SCNC: 4.5 MMOL/L (ref 3.7–5.3)
PROTHROMBIN TIME: 20.8 SEC (ref 9–11.6)
RBC # BLD: 3.1 M/UL (ref 4.5–5.9)
RBC # BLD: ABNORMAL 10*6/UL
SEG NEUTROPHILS: 84 % (ref 39–75)
SEGMENTED NEUTROPHILS ABSOLUTE COUNT: 9.1 K/UL (ref 2.1–6.5)
SODIUM BLD-SCNC: 139 MMOL/L (ref 135–144)
SPECIMEN DESCRIPTION: ABNORMAL
WBC # BLD: 10.9 K/UL (ref 3.5–11)
WBC # BLD: ABNORMAL 10*3/UL

## 2020-05-23 PROCEDURE — 2580000003 HC RX 258: Performed by: INTERNAL MEDICINE

## 2020-05-23 PROCEDURE — 6370000000 HC RX 637 (ALT 250 FOR IP): Performed by: INTERNAL MEDICINE

## 2020-05-23 PROCEDURE — 2700000000 HC OXYGEN THERAPY PER DAY

## 2020-05-23 PROCEDURE — 94761 N-INVAS EAR/PLS OXIMETRY MLT: CPT

## 2020-05-23 PROCEDURE — 6360000002 HC RX W HCPCS: Performed by: INTERNAL MEDICINE

## 2020-05-23 PROCEDURE — 94640 AIRWAY INHALATION TREATMENT: CPT

## 2020-05-23 PROCEDURE — 71045 X-RAY EXAM CHEST 1 VIEW: CPT

## 2020-05-23 PROCEDURE — 51702 INSERT TEMP BLADDER CATH: CPT

## 2020-05-23 PROCEDURE — 85025 COMPLETE CBC W/AUTO DIFF WBC: CPT

## 2020-05-23 PROCEDURE — 85610 PROTHROMBIN TIME: CPT

## 2020-05-23 PROCEDURE — 80048 BASIC METABOLIC PNL TOTAL CA: CPT

## 2020-05-23 PROCEDURE — 2500000003 HC RX 250 WO HCPCS: Performed by: INTERNAL MEDICINE

## 2020-05-23 PROCEDURE — 82947 ASSAY GLUCOSE BLOOD QUANT: CPT

## 2020-05-23 PROCEDURE — 1200000000 HC SEMI PRIVATE

## 2020-05-23 PROCEDURE — 83735 ASSAY OF MAGNESIUM: CPT

## 2020-05-23 PROCEDURE — 36415 COLL VENOUS BLD VENIPUNCTURE: CPT

## 2020-05-23 RX ORDER — VANCOMYCIN HYDROCHLORIDE 500 MG/10ML
INJECTION, POWDER, LYOPHILIZED, FOR SOLUTION INTRAVENOUS
Status: DISPENSED
Start: 2020-05-23 | End: 2020-05-24

## 2020-05-23 RX ORDER — FUROSEMIDE 10 MG/ML
20 INJECTION INTRAMUSCULAR; INTRAVENOUS 2 TIMES DAILY
Status: DISCONTINUED | OUTPATIENT
Start: 2020-05-23 | End: 2020-05-24

## 2020-05-23 RX ORDER — WARFARIN SODIUM 2.5 MG/1
2.5 TABLET ORAL
Status: COMPLETED | OUTPATIENT
Start: 2020-05-23 | End: 2020-05-23

## 2020-05-23 RX ORDER — DOCUSATE SODIUM 100 MG/1
100 CAPSULE, LIQUID FILLED ORAL 2 TIMES DAILY
Status: DISCONTINUED | OUTPATIENT
Start: 2020-05-23 | End: 2020-05-24 | Stop reason: HOSPADM

## 2020-05-23 RX ADMIN — CITALOPRAM HYDROBROMIDE 20 MG: 20 TABLET ORAL at 08:22

## 2020-05-23 RX ADMIN — METRONIDAZOLE 500 MG: 500 INJECTION, SOLUTION INTRAVENOUS at 17:30

## 2020-05-23 RX ADMIN — METOPROLOL TARTRATE 25 MG: 25 TABLET, FILM COATED ORAL at 08:22

## 2020-05-23 RX ADMIN — METRONIDAZOLE 500 MG: 500 INJECTION, SOLUTION INTRAVENOUS at 09:00

## 2020-05-23 RX ADMIN — Medication 250 MG: at 21:21

## 2020-05-23 RX ADMIN — HYDRALAZINE HYDROCHLORIDE 50 MG: 25 TABLET, FILM COATED ORAL at 21:26

## 2020-05-23 RX ADMIN — IPRATROPIUM BROMIDE AND ALBUTEROL SULFATE 3 ML: .5; 3 SOLUTION RESPIRATORY (INHALATION) at 14:44

## 2020-05-23 RX ADMIN — WARFARIN SODIUM 2.5 MG: 2.5 TABLET ORAL at 19:46

## 2020-05-23 RX ADMIN — METRONIDAZOLE 500 MG: 500 INJECTION, SOLUTION INTRAVENOUS at 01:20

## 2020-05-23 RX ADMIN — FUROSEMIDE 40 MG: 10 INJECTION, SOLUTION INTRAMUSCULAR; INTRAVENOUS at 08:22

## 2020-05-23 RX ADMIN — LEVOFLOXACIN 250 MG: 5 INJECTION, SOLUTION INTRAVENOUS at 08:23

## 2020-05-23 RX ADMIN — FUROSEMIDE 20 MG: 10 INJECTION, SOLUTION INTRAMUSCULAR; INTRAVENOUS at 17:40

## 2020-05-23 RX ADMIN — HYDRALAZINE HYDROCHLORIDE 50 MG: 25 TABLET, FILM COATED ORAL at 08:22

## 2020-05-23 RX ADMIN — INSULIN GLARGINE 10 UNITS: 100 INJECTION, SOLUTION SUBCUTANEOUS at 21:22

## 2020-05-23 RX ADMIN — Medication 10 ML: at 21:27

## 2020-05-23 RX ADMIN — HYDRALAZINE HYDROCHLORIDE 50 MG: 25 TABLET, FILM COATED ORAL at 15:00

## 2020-05-23 RX ADMIN — METOPROLOL TARTRATE 12.5 MG: 25 TABLET, FILM COATED ORAL at 21:26

## 2020-05-23 RX ADMIN — DOCUSATE SODIUM 100 MG: 100 CAPSULE, LIQUID FILLED ORAL at 11:55

## 2020-05-23 RX ADMIN — Medication 10 ML: at 08:23

## 2020-05-23 RX ADMIN — ATORVASTATIN CALCIUM 40 MG: 40 TABLET, FILM COATED ORAL at 21:26

## 2020-05-23 RX ADMIN — LISINOPRIL 5 MG: 5 TABLET ORAL at 08:22

## 2020-05-23 RX ADMIN — INSULIN GLARGINE 10 UNITS: 100 INJECTION, SOLUTION SUBCUTANEOUS at 08:23

## 2020-05-23 RX ADMIN — AMLODIPINE BESYLATE 5 MG: 5 TABLET ORAL at 08:22

## 2020-05-23 RX ADMIN — SENNOSIDES 17.2 MG: 8.6 TABLET, FILM COATED ORAL at 08:22

## 2020-05-23 RX ADMIN — CITALOPRAM HYDROBROMIDE 20 MG: 20 TABLET ORAL at 21:26

## 2020-05-23 RX ADMIN — IPRATROPIUM BROMIDE AND ALBUTEROL SULFATE 3 ML: .5; 3 SOLUTION RESPIRATORY (INHALATION) at 21:28

## 2020-05-23 RX ADMIN — INSULIN LISPRO 1 UNITS: 100 INJECTION, SOLUTION INTRAVENOUS; SUBCUTANEOUS at 11:42

## 2020-05-23 ASSESSMENT — PAIN SCALES - GENERAL
PAINLEVEL_OUTOF10: 0

## 2020-05-23 NOTE — H&P
scattered, mid transverse colon    H/O ischemic left MCA stroke 12/2016    Hyperlipidemia     Hypertension     Polyp of colon 1999    mucosal    Tonsil, abscess        Past Surgical History:        Procedure Laterality Date    COLONOSCOPY  6/23/10    mucosal polyp 1999, diverticula    CYSTOURETHROSCOPY      per Dr. Roge Huitron, 2012    GASTROSTOMY TUBE PLACEMENT  12/21/2016    HERNIA REPAIR         Home Medications:   No current facility-administered medications on file prior to encounter. Current Outpatient Medications on File Prior to Encounter   Medication Sig Dispense Refill    metoprolol tartrate (LOPRESSOR) 25 MG tablet Take 1 tablet by mouth 2 times daily 60 tablet 3    lisinopril (PRINIVIL;ZESTRIL) 5 MG tablet Take 1 tablet by mouth daily 30 tablet 3    senna (SENOKOT) 8.6 MG tablet Take 2 tablets by mouth 2 times daily With breakfast and lunch      hydrALAZINE (APRESOLINE) 50 MG tablet Take 50 mg by mouth 3 times daily      acetaminophen (TYLENOL) 500 MG tablet Take 1,000 mg by mouth 3 times daily      furosemide (LASIX) 20 MG tablet Take 40 mg by mouth daily       citalopram (CELEXA) 10 MG tablet Take 20 mg by mouth 2 times daily       insulin glargine (LANTUS) 100 UNIT/ML injection vial Inject into the skin See Admin Instructions Take 8 units sub q in AM  Take 8 units sub q in PM      warfarin (COUMADIN) 5 MG tablet Take 5 mg by mouth See Admin Instructions 2.5MG  Monday Wednesday and Friday, 5mg all other days      atorvastatin (LIPITOR) 40 MG tablet Take 1 tablet by mouth nightly 30 tablet 3    amLODIPine (NORVASC) 5 MG tablet Take 1 tablet by mouth daily 30 tablet 3    ipratropium-albuterol (DUONEB) 0.5-2.5 (3) MG/3ML SOLN nebulizer solution Inhale 3 mLs into the lungs every 6 hours as needed for Shortness of Breath 360 mL 3       Allergies:  Zosyn [piperacillin sod-tazobactam so]    Social History:    reports that he has never smoked.  He has never used smokeless tobacco. He Epithelial Cells UA NOT REPORTED /HPF    Renal Epithelial, UA NOT REPORTED 0 /HPF    Bacteria, UA NOT REPORTED None    Mucus, UA NOT REPORTED None    Trichomonas, UA NOT REPORTED None    Amorphous, UA NOT REPORTED None    Other Observations UA NOT REPORTED NOT REQ.     Yeast, UA NOT REPORTED None   Glucose, Whole Blood    Collection Time: 05/22/20 10:29 AM   Result Value Ref Range    POC Glucose 111 (H) 65 - 99 mg/dL   Glucose, Whole Blood    Collection Time: 05/22/20 12:02 PM   Result Value Ref Range    POC Glucose 170 (H) 65 - 99 mg/dL   Troponin    Collection Time: 05/22/20  3:28 PM   Result Value Ref Range    Troponin, High Sensitivity NOT REPORTED 0 - 22 ng/L    Troponin T 0.05 (H) <0.03 ng/mL    Troponin Interp         Glucose, Whole Blood    Collection Time: 05/22/20  4:42 PM   Result Value Ref Range    POC Glucose 106 (H) 65 - 99 mg/dL   Glucose, Whole Blood    Collection Time: 05/22/20  8:00 PM   Result Value Ref Range    POC Glucose 178 (H) 65 - 99 mg/dL   Troponin    Collection Time: 05/22/20  8:44 PM   Result Value Ref Range    Troponin, High Sensitivity NOT REPORTED 0 - 22 ng/L    Troponin T 0.04 (H) <0.03 ng/mL    Troponin Interp         Protime-INR    Collection Time: 05/23/20  5:55 AM   Result Value Ref Range    Protime 20.8 (H) 9.0 - 11.6 sec    INR 2.1    Basic Metabolic Panel    Collection Time: 05/23/20  5:55 AM   Result Value Ref Range    Glucose 126 (H) 70 - 99 mg/dL    BUN 32 (H) 8 - 23 mg/dL    CREATININE 1.67 (H) 0.70 - 1.20 mg/dL    Bun/Cre Ratio 19 9 - 20    Calcium 9.2 8.6 - 10.4 mg/dL    Sodium 139 135 - 144 mmol/L    Potassium 4.5 3.7 - 5.3 mmol/L    Chloride 109 (H) 98 - 107 mmol/L    CO2 22 20 - 31 mmol/L    Anion Gap 8 (L) 9 - 17 mmol/L    GFR Non-African American 39 (L) >60 mL/min    GFR  48 (L) >60 mL/min    GFR Comment          GFR Staging NOT REPORTED    Magnesium    Collection Time: 05/23/20  5:55 AM   Result Value Ref Range    Magnesium 2.0 1.6 - 2.6 mg/dL

## 2020-05-23 NOTE — PROGRESS NOTES
Pt prefers to be on his R side or supine with pillow support. Pt refuses to be on his L side and refuses to have a pillow under his R flaccid arm. Pt answers yes or no questions and agrees that he is comfortable with no needs at this time. Pt denies pain or SOB upon assessment.

## 2020-05-23 NOTE — PROGRESS NOTES
9 beat run v-tach noted-patient asymptomatic at the time. Dr. Mikayla Retana made aware, to continue to monitor.

## 2020-05-23 NOTE — PROGRESS NOTES
Pt found coughing up clear sputum with red streaks. Pt states \"yes\" that this is the first occurrence. Will continue to monitor sputum production. Pt also had one incidence of clear emesis after shauna care/repositioning. Pt denies nausea and declines nausea medication. Pt currently resting in bed comfortably with all personal needs addressed. Update at 0611: Pt has not had any nausea or vomiting, and has not coughed up any more red tinged sputum. Pt is comfortable, resting on his R side with feet elevated. Denies pain or SOB.

## 2020-05-24 VITALS
HEART RATE: 81 BPM | RESPIRATION RATE: 18 BRPM | SYSTOLIC BLOOD PRESSURE: 148 MMHG | WEIGHT: 185.5 LBS | HEIGHT: 70 IN | OXYGEN SATURATION: 90 % | BODY MASS INDEX: 26.56 KG/M2 | TEMPERATURE: 98.6 F | DIASTOLIC BLOOD PRESSURE: 59 MMHG

## 2020-05-24 LAB
ABSOLUTE EOS #: 0.1 K/UL (ref 0–0.4)
ABSOLUTE IMMATURE GRANULOCYTE: ABNORMAL K/UL (ref 0–0.3)
ABSOLUTE LYMPH #: 1.1 K/UL (ref 1–4.8)
ABSOLUTE MONO #: 1.2 K/UL (ref 0–1)
ANION GAP SERPL CALCULATED.3IONS-SCNC: 9 MMOL/L (ref 9–17)
BASOPHILS # BLD: 0 % (ref 0–2)
BASOPHILS ABSOLUTE: 0 K/UL (ref 0–0.2)
BUN BLDV-MCNC: 34 MG/DL (ref 8–23)
BUN/CREAT BLD: 20 (ref 9–20)
CALCIUM SERPL-MCNC: 9.2 MG/DL (ref 8.6–10.4)
CHLORIDE BLD-SCNC: 107 MMOL/L (ref 98–107)
CO2: 25 MMOL/L (ref 20–31)
CREAT SERPL-MCNC: 1.69 MG/DL (ref 0.7–1.2)
DIFFERENTIAL TYPE: YES
EKG ATRIAL RATE: 58 BPM
EKG P AXIS: -2 DEGREES
EKG P-R INTERVAL: 164 MS
EKG Q-T INTERVAL: 448 MS
EKG QRS DURATION: 68 MS
EKG QTC CALCULATION (BAZETT): 439 MS
EKG R AXIS: 5 DEGREES
EKG T AXIS: 8 DEGREES
EKG VENTRICULAR RATE: 58 BPM
EOSINOPHILS RELATIVE PERCENT: 1 % (ref 0–5)
GFR AFRICAN AMERICAN: 47 ML/MIN
GFR NON-AFRICAN AMERICAN: 39 ML/MIN
GFR SERPL CREATININE-BSD FRML MDRD: ABNORMAL ML/MIN/{1.73_M2}
GFR SERPL CREATININE-BSD FRML MDRD: ABNORMAL ML/MIN/{1.73_M2}
GLUCOSE BLD-MCNC: 116 MG/DL (ref 70–99)
GLUCOSE BLD-MCNC: 124 MG/DL (ref 65–99)
GLUCOSE BLD-MCNC: 159 MG/DL (ref 65–99)
GLUCOSE BLD-MCNC: 81 MG/DL (ref 65–99)
HCT VFR BLD CALC: 28.5 % (ref 41–53)
HEMOGLOBIN: 9.4 G/DL (ref 13.5–17.5)
IMMATURE GRANULOCYTES: ABNORMAL %
INR BLD: 1.8
LYMPHOCYTES # BLD: 12 % (ref 13–44)
MAGNESIUM: 2 MG/DL (ref 1.6–2.6)
MCH RBC QN AUTO: 30.9 PG (ref 26–34)
MCHC RBC AUTO-ENTMCNC: 33.1 G/DL (ref 31–37)
MCV RBC AUTO: 93.4 FL (ref 80–100)
MONOCYTES # BLD: 12 % (ref 5–9)
NRBC AUTOMATED: ABNORMAL PER 100 WBC
PDW BLD-RTO: 16.5 % (ref 12.1–15.2)
PLATELET # BLD: 289 K/UL (ref 140–450)
PLATELET ESTIMATE: ABNORMAL
PMV BLD AUTO: ABNORMAL FL (ref 6–12)
POTASSIUM SERPL-SCNC: 4.1 MMOL/L (ref 3.7–5.3)
PROTHROMBIN TIME: 18.1 SEC (ref 9–11.6)
RBC # BLD: 3.05 M/UL (ref 4.5–5.9)
RBC # BLD: ABNORMAL 10*6/UL
SEG NEUTROPHILS: 75 % (ref 39–75)
SEGMENTED NEUTROPHILS ABSOLUTE COUNT: 7.2 K/UL (ref 2.1–6.5)
SODIUM BLD-SCNC: 141 MMOL/L (ref 135–144)
WBC # BLD: 9.6 K/UL (ref 3.5–11)
WBC # BLD: ABNORMAL 10*3/UL

## 2020-05-24 PROCEDURE — 93010 ELECTROCARDIOGRAM REPORT: CPT | Performed by: INTERNAL MEDICINE

## 2020-05-24 PROCEDURE — 6370000000 HC RX 637 (ALT 250 FOR IP): Performed by: INTERNAL MEDICINE

## 2020-05-24 PROCEDURE — 85610 PROTHROMBIN TIME: CPT

## 2020-05-24 PROCEDURE — 85025 COMPLETE CBC W/AUTO DIFF WBC: CPT

## 2020-05-24 PROCEDURE — 36415 COLL VENOUS BLD VENIPUNCTURE: CPT

## 2020-05-24 PROCEDURE — 83735 ASSAY OF MAGNESIUM: CPT

## 2020-05-24 PROCEDURE — 2500000003 HC RX 250 WO HCPCS: Performed by: INTERNAL MEDICINE

## 2020-05-24 PROCEDURE — 2580000003 HC RX 258

## 2020-05-24 PROCEDURE — 6360000002 HC RX W HCPCS: Performed by: INTERNAL MEDICINE

## 2020-05-24 PROCEDURE — 2580000003 HC RX 258: Performed by: INTERNAL MEDICINE

## 2020-05-24 PROCEDURE — 2700000000 HC OXYGEN THERAPY PER DAY

## 2020-05-24 PROCEDURE — 6360000002 HC RX W HCPCS

## 2020-05-24 PROCEDURE — 80048 BASIC METABOLIC PNL TOTAL CA: CPT

## 2020-05-24 PROCEDURE — 94761 N-INVAS EAR/PLS OXIMETRY MLT: CPT

## 2020-05-24 RX ORDER — FUROSEMIDE 20 MG/1
20 TABLET ORAL DAILY
Status: DISCONTINUED | OUTPATIENT
Start: 2020-05-24 | End: 2020-05-24 | Stop reason: HOSPADM

## 2020-05-24 RX ORDER — VANCOMYCIN HYDROCHLORIDE 500 MG/10ML
INJECTION, POWDER, LYOPHILIZED, FOR SOLUTION INTRAVENOUS
Status: DISPENSED
Start: 2020-05-24 | End: 2020-05-24

## 2020-05-24 RX ORDER — VANCOMYCIN HYDROCHLORIDE 125 MG/1
125 CAPSULE ORAL 4 TIMES DAILY
Qty: 40 CAPSULE | Refills: 0 | Status: SHIPPED | OUTPATIENT
Start: 2020-05-24 | End: 2020-06-03

## 2020-05-24 RX ORDER — VANCOMYCIN HYDROCHLORIDE 500 MG/10ML
INJECTION, POWDER, LYOPHILIZED, FOR SOLUTION INTRAVENOUS
Status: COMPLETED
Start: 2020-05-24 | End: 2020-05-24

## 2020-05-24 RX ADMIN — VANCOMYCIN HYDROCHLORIDE 500 MG: 500 INJECTION, POWDER, LYOPHILIZED, FOR SOLUTION INTRAVENOUS at 08:07

## 2020-05-24 RX ADMIN — AMLODIPINE BESYLATE 5 MG: 5 TABLET ORAL at 08:07

## 2020-05-24 RX ADMIN — HYDRALAZINE HYDROCHLORIDE 50 MG: 25 TABLET, FILM COATED ORAL at 08:08

## 2020-05-24 RX ADMIN — HYDRALAZINE HYDROCHLORIDE 50 MG: 25 TABLET, FILM COATED ORAL at 14:02

## 2020-05-24 RX ADMIN — METRONIDAZOLE 500 MG: 500 INJECTION, SOLUTION INTRAVENOUS at 08:08

## 2020-05-24 RX ADMIN — INSULIN GLARGINE 10 UNITS: 100 INJECTION, SOLUTION SUBCUTANEOUS at 08:07

## 2020-05-24 RX ADMIN — LEVOFLOXACIN 250 MG: 5 INJECTION, SOLUTION INTRAVENOUS at 08:08

## 2020-05-24 RX ADMIN — Medication 10 ML: at 08:07

## 2020-05-24 RX ADMIN — FUROSEMIDE 20 MG: 20 TABLET ORAL at 10:04

## 2020-05-24 RX ADMIN — METOPROLOL TARTRATE 12.5 MG: 25 TABLET, FILM COATED ORAL at 08:07

## 2020-05-24 RX ADMIN — Medication 250 MG: at 02:08

## 2020-05-24 RX ADMIN — FUROSEMIDE 20 MG: 10 INJECTION, SOLUTION INTRAMUSCULAR; INTRAVENOUS at 08:08

## 2020-05-24 RX ADMIN — LISINOPRIL 5 MG: 5 TABLET ORAL at 08:07

## 2020-05-24 RX ADMIN — METRONIDAZOLE 500 MG: 500 INJECTION, SOLUTION INTRAVENOUS at 00:56

## 2020-05-24 RX ADMIN — VANCOMYCIN HYDROCHLORIDE 500 MG: 500 INJECTION, POWDER, LYOPHILIZED, FOR SOLUTION INTRAVENOUS at 14:04

## 2020-05-24 RX ADMIN — WATER 10 ML: 1 INJECTION INTRAMUSCULAR; INTRAVENOUS; SUBCUTANEOUS at 08:07

## 2020-05-24 RX ADMIN — WATER 10 ML: 1 INJECTION INTRAMUSCULAR; INTRAVENOUS; SUBCUTANEOUS at 14:04

## 2020-05-24 RX ADMIN — CITALOPRAM HYDROBROMIDE 20 MG: 20 TABLET ORAL at 08:07

## 2020-05-24 ASSESSMENT — PAIN SCALES - GENERAL
PAINLEVEL_OUTOF10: 0
PAINLEVEL_OUTOF10: 0

## 2020-05-24 NOTE — PROGRESS NOTES
Pt found vomiting brown liquid at 2030 prior to HS medication. Pt denies nausea and states \"yes\" that he is comfortable and wants to attempt to take his HS meds  at a later time. Pt able to take all HS medication an hour later without complaints of nausea or another emesis episode. PO Vancocin dose rescheduled due to Pt's condition.

## 2020-05-24 NOTE — PROGRESS NOTES
and Broca's aphasia  8. Mildly elevated troponin, most likely due to CKD and hypoxia. The patient is asymptomatic, EKG did not have acute changes. 9.  History of atrial fibrillation -anticoagulated with Coumadin, Lopressor 25 mg twice daily. He is noted to be bradycardic, will decrease Lopressor to 12.5 mg twice daily.   Pharmacy to dose Coumadin, monitor daily PT and INR    Patient continues to require inpatient admission related to need for CHF treatment      Electronically signed by Raejean Bernheim, MD on 5/24/2020 at 9:31 AM    Rounding Hospitalist

## 2020-05-24 NOTE — DISCHARGE SUMMARY
tablets by mouth 2 times daily             senna (SENOKOT) 8.6 MG tablet  Take 2 tablets by mouth 2 times daily With breakfast and lunch             vancomycin (VANCOCIN) 125 MG capsule  Take 1 capsule by mouth 4 times daily for 10 days             warfarin (COUMADIN) 5 MG tablet  Take 5 mg by mouth See Admin Instructions 2.5MG  Monday Wednesday and Friday, 5mg all other days                 Diet:  DIET LOW SODIUM 2 GM; Carb Control: 4 carb choices (60 gms)/meal    Activity:  Resume pre hospital activities    Follow-up:  in 1 weeks with GENE SINGLETARY      Diagnostic Test:      CBC:   Recent Labs     05/24/20  0629   WBC 9.6   HGB 9.4*        BMP:    Recent Labs     05/24/20  0629      K 4.1      CO2 25   BUN 34*   CREATININE 1.69*   GLUCOSE 116*     Calcium:  Recent Labs     05/24/20  0629   CALCIUM 9.2     Ionized Calcium:No results for input(s): IONCA in the last 72 hours.   Magnesium:  Recent Labs     05/24/20  0629   MG 2.0       Glucose:  Recent Labs     05/24/20  1139   POCGLU 124*     HgbA1C:   Recent Labs     05/22/20  0936   LABA1C 6.4*     INR:   Recent Labs     05/24/20  0629   INR 1.8     Hepatic:   Recent Labs     05/22/20  0605   ALKPHOS 95   ALT 25   AST 20   PROT 7.8   BILITOT 0.72   LABALBU 4.2     Amylase and Lipase:  Recent Labs     05/22/20  0605   LACTA 1.1     Lactic Acid:   Recent Labs     05/22/20  0605   LACTA 1.1       Physical Exam:    Vitals:  Patient Vitals for the past 24 hrs:   BP Temp Temp src Pulse Resp SpO2 Weight   05/24/20 1015 -- -- -- -- -- 90 % --   05/24/20 0830 (!) 146/64 98.6 °F (37 °C) Oral 81 18 92 % --   05/24/20 0632 -- -- -- -- -- 96 % --   05/24/20 0600 -- -- -- -- -- -- 185 lb 8 oz (84.1 kg)   05/24/20 0445 (!) 149/50 98.8 °F (37.1 °C) Oral 66 20 96 % --   05/23/20 2300 (!) 132/50 99 °F (37.2 °C) Oral 71 20 -- --   05/23/20 2129 -- -- -- -- -- 95 % --   05/23/20 1942 (!) 150/53 98.4 °F (36.9 °C) Oral 64 20 95 % --   05/23/20 1515 (!) 164/57 98.6 °F (37 °C) Oral 57 18 96 % --   05/23/20 1502 -- -- -- -- -- 96 % --   05/23/20 1501 (!) 148/62 98.4 °F (36.9 °C) Oral 64 16 96 % --   05/23/20 1444 -- -- -- -- -- 90 % --     Weight: Weight: 185 lb 8 oz (84.1 kg)     24 hour intake/output:    Intake/Output Summary (Last 24 hours) at 5/24/2020 1314  Last data filed at 5/24/2020 2027  Gross per 24 hour   Intake 567 ml   Output 2050 ml   Net -1483 ml     General Appearance: alert and oriented to person, place and time, in no acute distress, has aphasia  Cardiovascular: normal rate, regular rhythm, normal S1 and J8, 7/7  [id number], rubs, clicks, or gallops, distal pulses intact  Pulmonary/Chest: clear to auscultation bilaterally, no wheezes, rales or rhonchi, normal air movement, no respiratory distress  Abdomen: soft, non-tender, non-distended, normal bowel sounds   Extremities: no cyanosis, clubbing or edema  Neurological: Awake, alert, has aphasia, his right side hemiplegia     Hospital Course: The patient is a 80 y.o. male presented to the emergency room by EMS complaining of shortness of breath. The patient has a history of CVA with aphasia and is unable to communicate. He is able to say yes and no to most of my questions. Apparently his shortness of breath started  rather suddenly, became severe, constant. Patient was unable to breathe and the family called squad. Patient denies having any chest pain, he does admit to some cough. He had no fevers or chills. Work-up in the emergency room revealed low-grade fever 99.6, respirations 35, heart rate 83, blood pressure was elevated 185/77. He was hypoxic and was placed on BiPAP on arrival.  He was treated with Lasix. Chemistry panel revealed sodium 137, potassium 5.1, chloride 108, CO2 19, BUN 29, creatinine 1.49. Lactic acid was 1.1. Calcium was 9.4.  proBNP was 2, 672, troponin 0 0.03. LFTs were normal.  CBC revealed elevated WBC count 15.9, H&H was 11.3/34.8, platelets 609.   EKG revealed sinus bradycardia, some nonspecific ST segment changes, portable chest x-ray revealed CHF with interstitial vascular congestion and bilateral pleural effusions, bilateral lower lung atelectasis. Apparently patient was having some diarrhea and he was tested for C. difficile which came back negative. Due to hypoxia, CHF and suspected pneumonia the patient was admitted to hospital for IV Lasix, IV antibiotics and close monitoring of clinical condition. He clinically improved with IV lasix. His weight is down by about 10 lbs. His hypoxia improved and he is saturating around 90-92 % on RA. Repeat CXR 5/23/20 revealed no pleural effusion, no consolidation. Today we removed Giraldo catheter and he was able to urinate on his own  Although he was initially presumed to have possible pneumonia, I don't feel it's the case as he has no symptoms, CXR shows no pneumonia. Therefore, I will not continue Antibiotics after discharge. Scott Coleman also had diarrhea. He had no associated abdominal pain, no nausea/vomiting. He was started on oral Vancomycin and his diarrhea is improving. Will prescribe oral Vanco course for 10 days. Patient also had mild bradycardia. He was asymptomatic. His Lopressor was reduced to 12.5 mg bid from 25 mg bid. His heart rate improved. He is stable for discharge today. Follow up with PCP in about one week.     Disposition: home     Condition: Stable    Time Spent: 35 minutes      Electronically signed by Amanda Adams MD on 5/24/2020 at 1:14 PM  Discharging Hospitalist

## 2020-05-26 ENCOUNTER — CARE COORDINATION (OUTPATIENT)
Dept: CASE MANAGEMENT | Age: 85
End: 2020-05-26

## 2020-05-27 ENCOUNTER — CARE COORDINATION (OUTPATIENT)
Dept: CASE MANAGEMENT | Age: 85
End: 2020-05-27

## 2020-08-27 ENCOUNTER — APPOINTMENT (OUTPATIENT)
Dept: GENERAL RADIOLOGY | Age: 85
DRG: 194 | End: 2020-08-27
Payer: MEDICARE

## 2020-08-27 ENCOUNTER — HOSPITAL ENCOUNTER (INPATIENT)
Age: 85
LOS: 3 days | Discharge: HOME | DRG: 194 | End: 2020-08-30
Attending: FAMILY MEDICINE | Admitting: INTERNAL MEDICINE
Payer: MEDICARE

## 2020-08-27 PROBLEM — I50.23 ACUTE ON CHRONIC SYSTOLIC CHF (CONGESTIVE HEART FAILURE) (HCC): Status: ACTIVE | Noted: 2020-08-27

## 2020-08-27 LAB
-: ABNORMAL
ABSOLUTE EOS #: 0.1 K/UL (ref 0–0.4)
ABSOLUTE IMMATURE GRANULOCYTE: ABNORMAL K/UL (ref 0–0.3)
ABSOLUTE LYMPH #: 1.1 K/UL (ref 1–4.8)
ABSOLUTE MONO #: 1 K/UL (ref 0–1)
AMORPHOUS: ABNORMAL
ANION GAP SERPL CALCULATED.3IONS-SCNC: 9 MMOL/L (ref 9–17)
BACTERIA: ABNORMAL
BASOPHILS # BLD: 0 % (ref 0–2)
BASOPHILS ABSOLUTE: 0 K/UL (ref 0–0.2)
BILIRUBIN URINE: NEGATIVE
BNP INTERPRETATION: ABNORMAL
BUN BLDV-MCNC: 35 MG/DL (ref 8–23)
BUN/CREAT BLD: 19 (ref 9–20)
CALCIUM SERPL-MCNC: 9.6 MG/DL (ref 8.6–10.4)
CASTS UA: ABNORMAL /LPF
CASTS UA: ABNORMAL /LPF
CHLORIDE BLD-SCNC: 110 MMOL/L (ref 98–107)
CO2: 19 MMOL/L (ref 20–31)
COLOR: YELLOW
COMMENT UA: ABNORMAL
CREAT SERPL-MCNC: 1.83 MG/DL (ref 0.7–1.2)
CRYSTALS, UA: ABNORMAL /HPF
DIFFERENTIAL TYPE: YES
EOSINOPHILS RELATIVE PERCENT: 1 % (ref 0–5)
EPITHELIAL CELLS UA: ABNORMAL /HPF
GFR AFRICAN AMERICAN: 43 ML/MIN
GFR NON-AFRICAN AMERICAN: 35 ML/MIN
GFR SERPL CREATININE-BSD FRML MDRD: ABNORMAL ML/MIN/{1.73_M2}
GFR SERPL CREATININE-BSD FRML MDRD: ABNORMAL ML/MIN/{1.73_M2}
GLUCOSE BLD-MCNC: 180 MG/DL (ref 70–99)
GLUCOSE BLD-MCNC: 183 MG/DL (ref 65–99)
GLUCOSE URINE: NEGATIVE
HCT VFR BLD CALC: 32.5 % (ref 41–53)
HEMOGLOBIN: 10.9 G/DL (ref 13.5–17.5)
IMMATURE GRANULOCYTES: ABNORMAL %
INR BLD: 2
KETONES, URINE: NEGATIVE
LEUKOCYTE ESTERASE, URINE: NEGATIVE
LV EF: 55 %
LVEF MODALITY: NORMAL
LYMPHOCYTES # BLD: 8 % (ref 13–44)
MCH RBC QN AUTO: 31.4 PG (ref 26–34)
MCHC RBC AUTO-ENTMCNC: 33.5 G/DL (ref 31–37)
MCV RBC AUTO: 93.8 FL (ref 80–100)
MONOCYTES # BLD: 7 % (ref 5–9)
MUCUS: ABNORMAL
NITRITE, URINE: NEGATIVE
NRBC AUTOMATED: ABNORMAL PER 100 WBC
OTHER OBSERVATIONS UA: ABNORMAL
PDW BLD-RTO: 15 % (ref 12.1–15.2)
PH UA: 5 (ref 5–8)
PLATELET # BLD: 351 K/UL (ref 140–450)
PLATELET ESTIMATE: ABNORMAL
PMV BLD AUTO: ABNORMAL FL (ref 6–12)
POTASSIUM SERPL-SCNC: 5.5 MMOL/L (ref 3.7–5.3)
PRO-BNP: 3812 PG/ML
PROTEIN UA: ABNORMAL
PROTHROMBIN TIME: 22.4 SEC (ref 11.5–14.2)
RBC # BLD: 3.46 M/UL (ref 4.5–5.9)
RBC # BLD: ABNORMAL 10*6/UL
RBC UA: ABNORMAL /HPF (ref 0–2)
RENAL EPITHELIAL, UA: ABNORMAL /HPF
SARS-COV-2, PCR: NORMAL
SARS-COV-2, RAPID: NOT DETECTED
SARS-COV-2: NORMAL
SEG NEUTROPHILS: 84 % (ref 39–75)
SEGMENTED NEUTROPHILS ABSOLUTE COUNT: 11.8 K/UL (ref 2.1–6.5)
SODIUM BLD-SCNC: 138 MMOL/L (ref 135–144)
SOURCE: NORMAL
SPECIFIC GRAVITY UA: 1.01 (ref 1–1.03)
TRICHOMONAS: ABNORMAL
TROPONIN INTERP: ABNORMAL
TROPONIN T: 0.04 NG/ML
TROPONIN, HIGH SENSITIVITY: ABNORMAL NG/L (ref 0–22)
TURBIDITY: CLEAR
URINE HGB: NEGATIVE
UROBILINOGEN, URINE: NORMAL
WBC # BLD: 14.1 K/UL (ref 3.5–11)
WBC # BLD: ABNORMAL 10*3/UL
WBC UA: ABNORMAL /HPF
YEAST: ABNORMAL

## 2020-08-27 PROCEDURE — 96374 THER/PROPH/DIAG INJ IV PUSH: CPT

## 2020-08-27 PROCEDURE — U0002 COVID-19 LAB TEST NON-CDC: HCPCS

## 2020-08-27 PROCEDURE — 6360000002 HC RX W HCPCS: Performed by: INTERNAL MEDICINE

## 2020-08-27 PROCEDURE — 36415 COLL VENOUS BLD VENIPUNCTURE: CPT

## 2020-08-27 PROCEDURE — 85610 PROTHROMBIN TIME: CPT

## 2020-08-27 PROCEDURE — 2700000000 HC OXYGEN THERAPY PER DAY

## 2020-08-27 PROCEDURE — 71045 X-RAY EXAM CHEST 1 VIEW: CPT

## 2020-08-27 PROCEDURE — 93306 TTE W/DOPPLER COMPLETE: CPT

## 2020-08-27 PROCEDURE — 99285 EMERGENCY DEPT VISIT HI MDM: CPT

## 2020-08-27 PROCEDURE — 94664 DEMO&/EVAL PT USE INHALER: CPT

## 2020-08-27 PROCEDURE — 81001 URINALYSIS AUTO W/SCOPE: CPT

## 2020-08-27 PROCEDURE — 6370000000 HC RX 637 (ALT 250 FOR IP): Performed by: INTERNAL MEDICINE

## 2020-08-27 PROCEDURE — 84484 ASSAY OF TROPONIN QUANT: CPT

## 2020-08-27 PROCEDURE — 99222 1ST HOSP IP/OBS MODERATE 55: CPT | Performed by: INTERNAL MEDICINE

## 2020-08-27 PROCEDURE — 94761 N-INVAS EAR/PLS OXIMETRY MLT: CPT

## 2020-08-27 PROCEDURE — 85025 COMPLETE CBC W/AUTO DIFF WBC: CPT

## 2020-08-27 PROCEDURE — 80048 BASIC METABOLIC PNL TOTAL CA: CPT

## 2020-08-27 PROCEDURE — 83880 ASSAY OF NATRIURETIC PEPTIDE: CPT

## 2020-08-27 PROCEDURE — 82947 ASSAY GLUCOSE BLOOD QUANT: CPT

## 2020-08-27 PROCEDURE — 2580000003 HC RX 258: Performed by: INTERNAL MEDICINE

## 2020-08-27 PROCEDURE — 6360000002 HC RX W HCPCS: Performed by: EMERGENCY MEDICINE

## 2020-08-27 PROCEDURE — 1200000000 HC SEMI PRIVATE

## 2020-08-27 PROCEDURE — 93005 ELECTROCARDIOGRAM TRACING: CPT | Performed by: FAMILY MEDICINE

## 2020-08-27 RX ORDER — CITALOPRAM 20 MG/1
20 TABLET ORAL DAILY
Status: DISCONTINUED | OUTPATIENT
Start: 2020-08-27 | End: 2020-08-30 | Stop reason: HOSPADM

## 2020-08-27 RX ORDER — ACETAMINOPHEN 650 MG/1
650 SUPPOSITORY RECTAL EVERY 6 HOURS PRN
Status: DISCONTINUED | OUTPATIENT
Start: 2020-08-27 | End: 2020-08-30 | Stop reason: HOSPADM

## 2020-08-27 RX ORDER — GUAIFENESIN 600 MG/1
600 TABLET, EXTENDED RELEASE ORAL 2 TIMES DAILY
Status: DISCONTINUED | OUTPATIENT
Start: 2020-08-27 | End: 2020-08-30 | Stop reason: HOSPADM

## 2020-08-27 RX ORDER — LEVOFLOXACIN 5 MG/ML
500 INJECTION, SOLUTION INTRAVENOUS ONCE
Status: COMPLETED | OUTPATIENT
Start: 2020-08-27 | End: 2020-08-27

## 2020-08-27 RX ORDER — AMLODIPINE BESYLATE 5 MG/1
5 TABLET ORAL DAILY
Status: DISCONTINUED | OUTPATIENT
Start: 2020-08-27 | End: 2020-08-30 | Stop reason: HOSPADM

## 2020-08-27 RX ORDER — ONDANSETRON 2 MG/ML
4 INJECTION INTRAMUSCULAR; INTRAVENOUS EVERY 6 HOURS PRN
Status: DISCONTINUED | OUTPATIENT
Start: 2020-08-27 | End: 2020-08-30 | Stop reason: HOSPADM

## 2020-08-27 RX ORDER — SENNA PLUS 8.6 MG/1
2 TABLET ORAL 2 TIMES DAILY
Status: DISCONTINUED | OUTPATIENT
Start: 2020-08-27 | End: 2020-08-30 | Stop reason: HOSPADM

## 2020-08-27 RX ORDER — ATORVASTATIN CALCIUM 40 MG/1
40 TABLET, FILM COATED ORAL NIGHTLY
Status: DISCONTINUED | OUTPATIENT
Start: 2020-08-27 | End: 2020-08-30 | Stop reason: HOSPADM

## 2020-08-27 RX ORDER — FUROSEMIDE 10 MG/ML
20 INJECTION INTRAMUSCULAR; INTRAVENOUS ONCE
Status: COMPLETED | OUTPATIENT
Start: 2020-08-27 | End: 2020-08-27

## 2020-08-27 RX ORDER — POTASSIUM CHLORIDE 750 MG/1
10 CAPSULE, EXTENDED RELEASE ORAL DAILY
COMMUNITY

## 2020-08-27 RX ORDER — IPRATROPIUM BROMIDE AND ALBUTEROL SULFATE 2.5; .5 MG/3ML; MG/3ML
3 SOLUTION RESPIRATORY (INHALATION) EVERY 6 HOURS PRN
Status: DISCONTINUED | OUTPATIENT
Start: 2020-08-27 | End: 2020-08-30 | Stop reason: HOSPADM

## 2020-08-27 RX ORDER — SODIUM CHLORIDE 0.9 % (FLUSH) 0.9 %
10 SYRINGE (ML) INJECTION PRN
Status: DISCONTINUED | OUTPATIENT
Start: 2020-08-27 | End: 2020-08-30 | Stop reason: HOSPADM

## 2020-08-27 RX ORDER — FUROSEMIDE 10 MG/ML
20 INJECTION INTRAMUSCULAR; INTRAVENOUS 2 TIMES DAILY
Status: DISCONTINUED | OUTPATIENT
Start: 2020-08-27 | End: 2020-08-28

## 2020-08-27 RX ORDER — SODIUM CHLORIDE 0.9 % (FLUSH) 0.9 %
10 SYRINGE (ML) INJECTION EVERY 12 HOURS SCHEDULED
Status: DISCONTINUED | OUTPATIENT
Start: 2020-08-27 | End: 2020-08-30 | Stop reason: HOSPADM

## 2020-08-27 RX ORDER — HYDRALAZINE HYDROCHLORIDE 25 MG/1
50 TABLET, FILM COATED ORAL 3 TIMES DAILY
Status: DISCONTINUED | OUTPATIENT
Start: 2020-08-27 | End: 2020-08-30 | Stop reason: HOSPADM

## 2020-08-27 RX ORDER — POLYETHYLENE GLYCOL 3350 17 G/17G
17 POWDER, FOR SOLUTION ORAL DAILY PRN
Status: DISCONTINUED | OUTPATIENT
Start: 2020-08-27 | End: 2020-08-30 | Stop reason: HOSPADM

## 2020-08-27 RX ORDER — LEVOFLOXACIN 5 MG/ML
250 INJECTION, SOLUTION INTRAVENOUS EVERY 24 HOURS
Status: DISCONTINUED | OUTPATIENT
Start: 2020-08-28 | End: 2020-08-30 | Stop reason: HOSPADM

## 2020-08-27 RX ORDER — WARFARIN SODIUM 5 MG/1
5 TABLET ORAL
Status: COMPLETED | OUTPATIENT
Start: 2020-08-27 | End: 2020-08-27

## 2020-08-27 RX ORDER — PROMETHAZINE HYDROCHLORIDE 25 MG/1
12.5 TABLET ORAL EVERY 6 HOURS PRN
Status: DISCONTINUED | OUTPATIENT
Start: 2020-08-27 | End: 2020-08-30 | Stop reason: HOSPADM

## 2020-08-27 RX ORDER — ACETAMINOPHEN 325 MG/1
650 TABLET ORAL EVERY 6 HOURS PRN
Status: DISCONTINUED | OUTPATIENT
Start: 2020-08-27 | End: 2020-08-30 | Stop reason: HOSPADM

## 2020-08-27 RX ORDER — INSULIN GLARGINE 100 [IU]/ML
8 INJECTION, SOLUTION SUBCUTANEOUS 2 TIMES DAILY
Status: DISCONTINUED | OUTPATIENT
Start: 2020-08-27 | End: 2020-08-30 | Stop reason: HOSPADM

## 2020-08-27 RX ORDER — METOPROLOL TARTRATE 50 MG/1
50 TABLET, FILM COATED ORAL DAILY
Status: DISCONTINUED | OUTPATIENT
Start: 2020-08-27 | End: 2020-08-30 | Stop reason: HOSPADM

## 2020-08-27 RX ADMIN — FUROSEMIDE 20 MG: 10 INJECTION, SOLUTION INTRAMUSCULAR; INTRAVENOUS at 08:28

## 2020-08-27 RX ADMIN — WARFARIN SODIUM 5 MG: 5 TABLET ORAL at 18:06

## 2020-08-27 RX ADMIN — GUAIFENESIN 600 MG: 600 TABLET, EXTENDED RELEASE ORAL at 21:36

## 2020-08-27 RX ADMIN — SENNOSIDES 17.2 MG: 8.6 TABLET, FILM COATED ORAL at 11:04

## 2020-08-27 RX ADMIN — METOPROLOL TARTRATE 50 MG: 50 TABLET, FILM COATED ORAL at 11:04

## 2020-08-27 RX ADMIN — CITALOPRAM HYDROBROMIDE 20 MG: 20 TABLET ORAL at 11:04

## 2020-08-27 RX ADMIN — HYDRALAZINE HYDROCHLORIDE 50 MG: 25 TABLET, FILM COATED ORAL at 21:36

## 2020-08-27 RX ADMIN — AMLODIPINE BESYLATE 5 MG: 5 TABLET ORAL at 11:04

## 2020-08-27 RX ADMIN — INSULIN GLARGINE 8 UNITS: 100 INJECTION, SOLUTION SUBCUTANEOUS at 21:30

## 2020-08-27 RX ADMIN — SODIUM CHLORIDE, PRESERVATIVE FREE 10 ML: 5 INJECTION INTRAVENOUS at 11:04

## 2020-08-27 RX ADMIN — SODIUM CHLORIDE, PRESERVATIVE FREE 10 ML: 5 INJECTION INTRAVENOUS at 21:29

## 2020-08-27 RX ADMIN — LEVOFLOXACIN 500 MG: 5 INJECTION, SOLUTION INTRAVENOUS at 21:30

## 2020-08-27 RX ADMIN — HYDRALAZINE HYDROCHLORIDE 50 MG: 25 TABLET, FILM COATED ORAL at 11:03

## 2020-08-27 RX ADMIN — FUROSEMIDE 20 MG: 10 INJECTION, SOLUTION INTRAMUSCULAR; INTRAVENOUS at 18:06

## 2020-08-27 RX ADMIN — ATORVASTATIN CALCIUM 40 MG: 40 TABLET, FILM COATED ORAL at 21:38

## 2020-08-27 ASSESSMENT — ENCOUNTER SYMPTOMS
SHORTNESS OF BREATH: 1
VOMITING: 0
ABDOMINAL PAIN: 0

## 2020-08-27 ASSESSMENT — PAIN SCALES - GENERAL
PAINLEVEL_OUTOF10: 0

## 2020-08-27 NOTE — PROGRESS NOTES
Cardiology    1. Mild CHF with sob  2. Hx of at least moderate AS, which may have worsened causing CHF  3. Normal LV function with EF 55% historically  4. Hx of PAF with CVA in 12- with right sided weakness. 5.  HTN  6. Chronic renal insufficiency with Cr up from baseline to 1.83  7. Detectable trop possibly secondary to CRF    He is in mild CHF at this time. Resting comfortably in bed. Developed sob early this morning. Denies chest pain. No pnd or orthopnea. Will repeat echo to evaluate AV and MV. Mild diuresis at this point watching renal function carefully.     Thanks, Dano Bañuelos MD

## 2020-08-27 NOTE — PROGRESS NOTES
Call placed to wife, Erin Gifford, to go over home medication. Wife reports that patient take medications with applesauce and a couple sips of water, he has not had any of his morning medications. Wife to drop off her dentures prior to lunch. Wife updated on patient status. Wife gives list of foods patient likes due to aphasia.

## 2020-08-27 NOTE — ED PROVIDER NOTES
[address]  eMERGENCY dEPARTMENT eNCOUnter          [address] [facility]       Chief Complaint   Patient presents with    Shortness of Breath     Pt laid flat thru the night and woke up with morning short of breath. Pt wife sat him up at home and he was feeling better upon arrival of ems. Pt brought in for further evaluation. Nurses Notes reviewed and I agree except as noted in the HPI. HISTORY OF PRESENT ILLNESS    Saul Patterson is a 80 y.o. male who presents to the emergency room via EMS from home, patient reporting shortness of breath. Onset was felt to be overnight, patient is normally supposed to sleep in a semi-Fowlers position but last night did sleep flat. No reported fevers cough, patient denying any chest pain, does not feel his abdomen is larger than normal, denies any vomiting, denies trauma or falls. Denies pain. EMS reports that patient pulse ox entry 89% on room air at home    PCP: John Orr    REVIEW OF SYSTEMS     Review of Systems   Constitutional: Negative for fever. Respiratory: Positive for shortness of breath. Cardiovascular: Negative for chest pain and leg swelling. Gastrointestinal: Negative for abdominal pain and vomiting. All other systems reviewed and are negative. PAST MEDICAL HISTORY    has a past medical history of Cerebrovascular disease, Diverticula of colon, H/O ischemic left MCA stroke, Hyperlipidemia, Hypertension, Polyp of colon, and Tonsil, abscess. SURGICAL HISTORY      has a past surgical history that includes hernia repair; Colonoscopy (6/23/10); cystourethroscopy; and Gastrostomy tube placement (12/21/2016).     CURRENT MEDICATIONS       Previous Medications    ACETAMINOPHEN (TYLENOL) 500 MG TABLET    Take 1,000 mg by mouth 3 times daily    AMLODIPINE (NORVASC) 5 MG TABLET    Take 1 tablet by mouth daily    ATORVASTATIN (LIPITOR) 40 MG TABLET    Take 1 tablet by mouth nightly    CITALOPRAM (CELEXA) 10 MG TABLET Take 20 mg by mouth 2 times daily     FUROSEMIDE (LASIX) 20 MG TABLET    Take 40 mg by mouth daily     HYDRALAZINE (APRESOLINE) 50 MG TABLET    Take 50 mg by mouth 3 times daily    INSULIN GLARGINE (LANTUS) 100 UNIT/ML INJECTION VIAL    Inject into the skin See Admin Instructions Take 8 units sub q in AM  Take 8 units sub q in PM    IPRATROPIUM-ALBUTEROL (DUONEB) 0.5-2.5 (3) MG/3ML SOLN NEBULIZER SOLUTION    Inhale 3 mLs into the lungs every 6 hours as needed for Shortness of Breath    LISINOPRIL (PRINIVIL;ZESTRIL) 5 MG TABLET    Take 1 tablet by mouth daily    METOPROLOL TARTRATE (LOPRESSOR) 25 MG TABLET    Take 0.5 tablets by mouth 2 times daily    POTASSIUM CHLORIDE (MICRO-K) 10 MEQ EXTENDED RELEASE CAPSULE    Take 10 mEq by mouth daily    SENNA (SENOKOT) 8.6 MG TABLET    Take 2 tablets by mouth 2 times daily With breakfast and lunch    WARFARIN (COUMADIN) 5 MG TABLET    Take 5 mg by mouth See Admin Instructions 2.5MG on Mondays       ALLERGIES     is allergic to zosyn [piperacillin sod-tazobactam so]. FAMILY HISTORY     has no family status information on file. family history is not on file. SOCIAL HISTORY      reports that he has never smoked. He has never used smokeless tobacco. He reports that he does not drink alcohol or use drugs. PHYSICAL EXAM     INITIAL VITALS:  height is 6' (1.829 m) and weight is 182 lb 4.8 oz (82.7 kg). His oral temperature is 98.9 °F (37.2 °C). His blood pressure is 137/50 (abnormal) and his pulse is 79. His respiration is 20 and oxygen saturation is 94%. Physical Exam   Constitutional: Patient is oriented to person, place, and time. Patient appears well-developed and well-nourished. Patient is active and cooperative. HENT:   Head: Normocephalic and atraumatic. Head is without contusion. Right Ear: Hearing and external ear normal. No drainage. Left Ear: Hearing and external ear normal. No drainage. Nose: Nose normal. No nasal deformity. No epistaxis. Mouth/Throat: Mucous membranes are not dry. Eyes: EOMI. Conjunctivae, sclera, and lids are normal. Right eye exhibits no discharge. Left eye exhibits no discharge. Neck: Full passive range of motion without pain and phonation normal.  Negative gross JVD, negative tracheal deviation  Cardiovascular:  Normal rate, regular rhythm and intact distal pulses. No lower extremity edema. Pulses: Right radial pulse  2+   Pulmonary/Chest: Effort normal. No tachypnea and no bradypnea. No wheezes, rhonchi, or rales. Abdominal: Soft. Abdomen is soft without gross distention, does appear to be a large midline abdominal hernia, nontender to palpation  Musculoskeletal:   Negative acute trauma or deformity, noted no movements of right upper and lower extremity, patient is observed with movement of left upper extremity  Neurological: Patient is alert and awake, though speech is slightly aphasic though overall intelligible. patient displays no tremor. Patient displays no seizure activity. Skin: Skin is warm and dry. Patient is not diaphoretic. Psychiatric: Patient has a normal mood and affect. Patient speech is normal and behavior is normal.     DIFFERENTIAL DIAGNOSIS:   Exacerbation CHF, pneumonia bronchitis URI, effusion, dysrhythmia NOS,    DIAGNOSTIC RESULTS     EKG: All EKG's are interpreted by the Emergency Department Physician who either signs or Co-signs this chart in the absence of a cardiologist.  EKG    The patient had an EKG which is interpreted by me in the absence of a Cardiologist.   [] Without comparison to previous. [x] With comparison to a previous EKG Dated 5/22/2020    EKG @ 0723 hrs -sinus rhythm rate 78 normal axis normal rules, as compared to prior EKG no significant changes morphology      RADIOLOGY: non-plain film images(s) such as CT, Ultrasound and MRI are read by the radiologist.  XR CHEST PORTABLE   Final Result   1.  Findings consistent with congestive heart failure with early interstitial edema.   2. Atelectasis and or infiltrate in the left lung base. 3. Small bilateral effusions. LABS:   Labs Reviewed   CBC WITH AUTO DIFFERENTIAL - Abnormal; Notable for the following components:       Result Value    WBC 14.1 (*)     RBC 3.46 (*)     Hemoglobin 10.9 (*)     Hematocrit 32.5 (*)     Seg Neutrophils 84 (*)     Lymphocytes 8 (*)     Segs Absolute 11.80 (*)     All other components within normal limits   BASIC METABOLIC PANEL W/ REFLEX TO MG FOR LOW K - Abnormal; Notable for the following components:    Glucose 180 (*)     BUN 35 (*)     CREATININE 1.83 (*)     Potassium 5.5 (*)     Chloride 110 (*)     CO2 19 (*)     GFR Non- 35 (*)     GFR  43 (*)     All other components within normal limits   TROPONIN - Abnormal; Notable for the following components:    Troponin T 0.04 (*)     All other components within normal limits   BRAIN NATRIURETIC PEPTIDE - Abnormal; Notable for the following components:    Pro-BNP 3,812 (*)     All other components within normal limits   PROTIME-INR - Abnormal; Notable for the following components:    Protime 22.4 (*)     All other components within normal limits   COVID-19       EMERGENCY DEPARTMENT COURSE:   Vitals:    Vitals:    08/27/20 0812 08/27/20 0818 08/27/20 0822 08/27/20 0833   BP: (!) 149/47 (!) 147/46 (!) 137/50    Pulse:       Resp:       Temp:       TempSrc:       SpO2: 95% 94% 94%    Weight:    182 lb 4.8 oz (82.7 kg)   Height:         Patient history and physical exam taken at bedside, discussed patient symptoms and exam findings, discussed initial work-up to include EKG chest x-ray blood work, IV access.   Patient placed on cardiac monitor, pulse ox, resting semi-Garza's in bed    EMR reviewed, noting history of diastolic heart failure, CVA with right-sided residual weakness, hypertension hyperlipidemia atrial fibrillation diabetes    Patient was placed on 2 L O2 via nasal cannula, improvement in pulse

## 2020-08-27 NOTE — PROGRESS NOTES
Dr. Balbina Cardenas called and updated on pt c/o continued SOB, difficulty clearing secretions and increased O2. RT at bedside for tx at this time.

## 2020-08-27 NOTE — CONSULTS
Clinical Pharmacy Note    Sarah Swartz is a 80 y.o. male for whom pharmacy has been asked to manage warfarin therapy. Consulting Physician: Dr Lopes Prudent      Warfarin dose prior to admission: 2.5mg Mondays, 5mg all other days  Warfarin indication: A fib  Target INR range: 2.0-3.0     Recent Labs     08/27/20  0725   HGB 10.9*   HCT 32.5*          Current warfarin drug-drug interactions: citalopram (home), APAP (home)      Date             INR        Dose   8/27/2020        2.0    5mg    Will order daily PT/INR until stable and within therapeutic range. Thank you for the consult. If you have any questions or concerns, [place] pharmacy [place] [phone].   Tere Manrique, R.Ph., 8/27/2020,10:29 AM
creatinine up slightly from  baseline, which is approximately 1.6 to currently 1.83. PLAN:  1.  Echocardiogram.  2.  Gentle diuresis, watching BUN and creatinine carefully. DISCUSSION:  The patient overall has done well. He awoke with some  shortness of breath this morning. He denies any chest pain or chest  discomfort, and he appears comfortable as we see him today. He does have a very loud aortic and mitral murmur. We will repeat his  echocardiogram to reevaluate his aortic valve. On exam, there is no evidence of CHF. There is no indication that he is having active ischemia. We will watch  his troponin, but again I suspect this is demand ischemia along with  renal insufficiency. Thank you very much for allowing me the privilege of seeing the patient. If you have any questions on my thoughts, please do not hesitate to  contact me.       Crista Miller    D: 08/27/2020 11:06:09       T: 08/27/2020 12:37:10     [id number]  Job#: [id number]     Doc#: [id number]    CC:  Eduard Shoemaker

## 2020-08-27 NOTE — PROGRESS NOTES
Dr. Eugenia Riddle called regarding incontinence of bowel and bladder, new order received and entered at this this time.

## 2020-08-28 LAB
ABSOLUTE EOS #: 0 K/UL (ref 0–0.4)
ABSOLUTE IMMATURE GRANULOCYTE: ABNORMAL K/UL (ref 0–0.3)
ABSOLUTE LYMPH #: 0.8 K/UL (ref 1–4.8)
ABSOLUTE MONO #: 1.1 K/UL (ref 0–1)
ANION GAP SERPL CALCULATED.3IONS-SCNC: 9 MMOL/L (ref 9–17)
BASOPHILS # BLD: 0 % (ref 0–2)
BASOPHILS ABSOLUTE: 0 K/UL (ref 0–0.2)
BUN BLDV-MCNC: 39 MG/DL (ref 8–23)
BUN/CREAT BLD: 20 (ref 9–20)
CALCIUM SERPL-MCNC: 9.2 MG/DL (ref 8.6–10.4)
CHLORIDE BLD-SCNC: 107 MMOL/L (ref 98–107)
CO2: 20 MMOL/L (ref 20–31)
CREAT SERPL-MCNC: 1.93 MG/DL (ref 0.7–1.2)
DIFFERENTIAL TYPE: YES
EKG ATRIAL RATE: 78 BPM
EKG P AXIS: 30 DEGREES
EKG P-R INTERVAL: 172 MS
EKG Q-T INTERVAL: 376 MS
EKG QRS DURATION: 74 MS
EKG QTC CALCULATION (BAZETT): 428 MS
EKG R AXIS: 34 DEGREES
EKG T AXIS: 57 DEGREES
EKG VENTRICULAR RATE: 78 BPM
EOSINOPHILS RELATIVE PERCENT: 0 % (ref 0–5)
GFR AFRICAN AMERICAN: 40 ML/MIN
GFR NON-AFRICAN AMERICAN: 33 ML/MIN
GFR SERPL CREATININE-BSD FRML MDRD: ABNORMAL ML/MIN/{1.73_M2}
GFR SERPL CREATININE-BSD FRML MDRD: ABNORMAL ML/MIN/{1.73_M2}
GLUCOSE BLD-MCNC: 158 MG/DL (ref 65–99)
GLUCOSE BLD-MCNC: 171 MG/DL (ref 65–99)
GLUCOSE BLD-MCNC: 187 MG/DL (ref 65–99)
GLUCOSE BLD-MCNC: 221 MG/DL (ref 70–99)
GLUCOSE BLD-MCNC: 272 MG/DL (ref 65–99)
HCT VFR BLD CALC: 29.7 % (ref 41–53)
HEMOGLOBIN: 9.9 G/DL (ref 13.5–17.5)
IMMATURE GRANULOCYTES: ABNORMAL %
INR BLD: 2.5
LYMPHOCYTES # BLD: 8 % (ref 13–44)
MCH RBC QN AUTO: 31.4 PG (ref 26–34)
MCHC RBC AUTO-ENTMCNC: 33.5 G/DL (ref 31–37)
MCV RBC AUTO: 93.8 FL (ref 80–100)
MONOCYTES # BLD: 11 % (ref 5–9)
NRBC AUTOMATED: ABNORMAL PER 100 WBC
PDW BLD-RTO: 15.2 % (ref 12.1–15.2)
PLATELET # BLD: 334 K/UL (ref 140–450)
PLATELET ESTIMATE: ABNORMAL
PMV BLD AUTO: ABNORMAL FL (ref 6–12)
POTASSIUM SERPL-SCNC: 5.3 MMOL/L (ref 3.7–5.3)
PROTHROMBIN TIME: 26.3 SEC (ref 11.5–14.2)
RBC # BLD: 3.16 M/UL (ref 4.5–5.9)
RBC # BLD: ABNORMAL 10*6/UL
SEG NEUTROPHILS: 81 % (ref 39–75)
SEGMENTED NEUTROPHILS ABSOLUTE COUNT: 8.5 K/UL (ref 2.1–6.5)
SODIUM BLD-SCNC: 136 MMOL/L (ref 135–144)
TROPONIN INTERP: ABNORMAL
TROPONIN T: 0.04 NG/ML
TROPONIN, HIGH SENSITIVITY: ABNORMAL NG/L (ref 0–22)
WBC # BLD: 10.4 K/UL (ref 3.5–11)
WBC # BLD: ABNORMAL 10*3/UL

## 2020-08-28 PROCEDURE — 1200000000 HC SEMI PRIVATE

## 2020-08-28 PROCEDURE — 2700000000 HC OXYGEN THERAPY PER DAY

## 2020-08-28 PROCEDURE — 84484 ASSAY OF TROPONIN QUANT: CPT

## 2020-08-28 PROCEDURE — 94669 MECHANICAL CHEST WALL OSCILL: CPT

## 2020-08-28 PROCEDURE — 2580000003 HC RX 258: Performed by: INTERNAL MEDICINE

## 2020-08-28 PROCEDURE — 36415 COLL VENOUS BLD VENIPUNCTURE: CPT

## 2020-08-28 PROCEDURE — 94640 AIRWAY INHALATION TREATMENT: CPT

## 2020-08-28 PROCEDURE — 6360000002 HC RX W HCPCS: Performed by: INTERNAL MEDICINE

## 2020-08-28 PROCEDURE — 85610 PROTHROMBIN TIME: CPT

## 2020-08-28 PROCEDURE — 85025 COMPLETE CBC W/AUTO DIFF WBC: CPT

## 2020-08-28 PROCEDURE — 93005 ELECTROCARDIOGRAM TRACING: CPT | Performed by: INTERNAL MEDICINE

## 2020-08-28 PROCEDURE — 6370000000 HC RX 637 (ALT 250 FOR IP): Performed by: INTERNAL MEDICINE

## 2020-08-28 PROCEDURE — 80048 BASIC METABOLIC PNL TOTAL CA: CPT

## 2020-08-28 PROCEDURE — 82947 ASSAY GLUCOSE BLOOD QUANT: CPT

## 2020-08-28 PROCEDURE — 93010 ELECTROCARDIOGRAM REPORT: CPT | Performed by: INTERNAL MEDICINE

## 2020-08-28 PROCEDURE — 94761 N-INVAS EAR/PLS OXIMETRY MLT: CPT

## 2020-08-28 RX ORDER — DEXTROSE MONOHYDRATE 50 MG/ML
100 INJECTION, SOLUTION INTRAVENOUS PRN
Status: DISCONTINUED | OUTPATIENT
Start: 2020-08-28 | End: 2020-08-30 | Stop reason: HOSPADM

## 2020-08-28 RX ORDER — LACTOBACILLUS RHAMNOSUS GG 10B CELL
1 CAPSULE ORAL 2 TIMES DAILY WITH MEALS
Status: DISCONTINUED | OUTPATIENT
Start: 2020-08-28 | End: 2020-08-30 | Stop reason: HOSPADM

## 2020-08-28 RX ORDER — NICOTINE POLACRILEX 4 MG
15 LOZENGE BUCCAL PRN
Status: DISCONTINUED | OUTPATIENT
Start: 2020-08-28 | End: 2020-08-30 | Stop reason: HOSPADM

## 2020-08-28 RX ORDER — FUROSEMIDE 10 MG/ML
20 INJECTION INTRAMUSCULAR; INTRAVENOUS DAILY
Status: DISCONTINUED | OUTPATIENT
Start: 2020-08-28 | End: 2020-08-30 | Stop reason: HOSPADM

## 2020-08-28 RX ORDER — WARFARIN SODIUM 5 MG/1
5 TABLET ORAL
Status: COMPLETED | OUTPATIENT
Start: 2020-08-28 | End: 2020-08-28

## 2020-08-28 RX ORDER — DEXTROSE MONOHYDRATE 25 G/50ML
12.5 INJECTION, SOLUTION INTRAVENOUS PRN
Status: DISCONTINUED | OUTPATIENT
Start: 2020-08-28 | End: 2020-08-30 | Stop reason: HOSPADM

## 2020-08-28 RX ADMIN — CITALOPRAM HYDROBROMIDE 20 MG: 20 TABLET ORAL at 08:44

## 2020-08-28 RX ADMIN — FUROSEMIDE 20 MG: 10 INJECTION, SOLUTION INTRAMUSCULAR; INTRAVENOUS at 08:45

## 2020-08-28 RX ADMIN — IPRATROPIUM BROMIDE AND ALBUTEROL SULFATE 3 ML: .5; 3 SOLUTION RESPIRATORY (INHALATION) at 18:21

## 2020-08-28 RX ADMIN — IPRATROPIUM BROMIDE AND ALBUTEROL SULFATE 3 ML: .5; 3 SOLUTION RESPIRATORY (INHALATION) at 05:46

## 2020-08-28 RX ADMIN — SODIUM CHLORIDE, PRESERVATIVE FREE 10 ML: 5 INJECTION INTRAVENOUS at 08:45

## 2020-08-28 RX ADMIN — GUAIFENESIN 600 MG: 600 TABLET, EXTENDED RELEASE ORAL at 08:44

## 2020-08-28 RX ADMIN — Medication 1 CAPSULE: at 16:35

## 2020-08-28 RX ADMIN — INSULIN GLARGINE 8 UNITS: 100 INJECTION, SOLUTION SUBCUTANEOUS at 20:55

## 2020-08-28 RX ADMIN — SODIUM CHLORIDE, PRESERVATIVE FREE 10 ML: 5 INJECTION INTRAVENOUS at 20:55

## 2020-08-28 RX ADMIN — HYDRALAZINE HYDROCHLORIDE 50 MG: 25 TABLET, FILM COATED ORAL at 20:55

## 2020-08-28 RX ADMIN — Medication 1 CAPSULE: at 08:44

## 2020-08-28 RX ADMIN — AMLODIPINE BESYLATE 5 MG: 5 TABLET ORAL at 08:44

## 2020-08-28 RX ADMIN — INSULIN GLARGINE 8 UNITS: 100 INJECTION, SOLUTION SUBCUTANEOUS at 08:44

## 2020-08-28 RX ADMIN — METOPROLOL TARTRATE 50 MG: 50 TABLET, FILM COATED ORAL at 08:44

## 2020-08-28 RX ADMIN — LEVOFLOXACIN 250 MG: 5 INJECTION, SOLUTION INTRAVENOUS at 20:54

## 2020-08-28 RX ADMIN — IPRATROPIUM BROMIDE AND ALBUTEROL SULFATE 3 ML: .5; 3 SOLUTION RESPIRATORY (INHALATION) at 11:36

## 2020-08-28 RX ADMIN — SENNOSIDES 17.2 MG: 8.6 TABLET, FILM COATED ORAL at 08:44

## 2020-08-28 RX ADMIN — IPRATROPIUM BROMIDE AND ALBUTEROL SULFATE 3 ML: .5; 3 SOLUTION RESPIRATORY (INHALATION) at 00:40

## 2020-08-28 RX ADMIN — INSULIN LISPRO 1 UNITS: 100 INJECTION, SOLUTION INTRAVENOUS; SUBCUTANEOUS at 20:56

## 2020-08-28 RX ADMIN — SENNOSIDES 17.2 MG: 8.6 TABLET, FILM COATED ORAL at 20:55

## 2020-08-28 RX ADMIN — HYDRALAZINE HYDROCHLORIDE 50 MG: 25 TABLET, FILM COATED ORAL at 08:44

## 2020-08-28 RX ADMIN — INSULIN LISPRO 3 UNITS: 100 INJECTION, SOLUTION INTRAVENOUS; SUBCUTANEOUS at 11:58

## 2020-08-28 RX ADMIN — HYDRALAZINE HYDROCHLORIDE 50 MG: 25 TABLET, FILM COATED ORAL at 14:53

## 2020-08-28 RX ADMIN — WARFARIN SODIUM 5 MG: 5 TABLET ORAL at 16:35

## 2020-08-28 RX ADMIN — INSULIN LISPRO 1 UNITS: 100 INJECTION, SOLUTION INTRAVENOUS; SUBCUTANEOUS at 08:51

## 2020-08-28 RX ADMIN — GUAIFENESIN 600 MG: 600 TABLET, EXTENDED RELEASE ORAL at 20:56

## 2020-08-28 RX ADMIN — INSULIN LISPRO 1 UNITS: 100 INJECTION, SOLUTION INTRAVENOUS; SUBCUTANEOUS at 16:36

## 2020-08-28 RX ADMIN — ATORVASTATIN CALCIUM 40 MG: 40 TABLET, FILM COATED ORAL at 20:56

## 2020-08-28 ASSESSMENT — PAIN SCALES - GENERAL
PAINLEVEL_OUTOF10: 0

## 2020-08-28 NOTE — PROGRESS NOTES
Patient high fowlers in bed eating lunch. This nurse cuts food into small pieces and assists patient with eating. Able to swallow food and liquids without incident. Refuses to get out of bed or to be repositioned. When asking about repositioning, patient motions straight up and down with left arm saying, \"No\" repeatedly.

## 2020-08-28 NOTE — PROGRESS NOTES
SW and RN case manager called and spoke with pt's wife to complete assessment and quality rounds as pt has expressive aphasia. Pt is an 80year old  male admitted for pneumonia, mild pulmonary edema. Pt lives with his spouse in their home in [facility]. Pt has several services at home for care. Pt has private duty caregivers that are with them 8:30am to 1:30pm and 4:30 to 9:30 pm. Pt also receives [facility] services and they come to visit every other week. Pt has a [place] nurse that comes once a month to draw blood for him as well. Pt has several pieces of DME including hospital bed, wheelchair, grab bars, shower chair, and elevated toilet seat. Spouse reports that pt is in the wheelchair or hospital bed most of the time and she has not been able to help as much lately as she recently had a pacemaker implanted. Pt is dependent for care. Spouse reports that his private aides bear hug lift him and place him in the car and they have been transporting him to appointments. Pt is a DNR CC code status and follows with Dr West Koch as PCP. Pt does not have advance directives but does have the DNR CC order in place. Spouse reports that they receive all of his medications and supplies from the [place] through the mail and have no cost associated with these. Spouse plans for pt to return home at discharge. Spouse identifies no other discharge needs at this time. Ye Aviles notified that pt is admitted inpatient currently. ANAND will continue to follow and remain available.  Shweta PUENTE 8/28/2020

## 2020-08-28 NOTE — PROGRESS NOTES
Categories: Normal Weight (BMI 18.5-24. 9)       Nutrition Diagnosis:   · Unintended weight loss related to inadequate protein-energy intake as evidenced by weight loss    Lab Results   Component Value Date     08/28/2020    K 5.3 08/28/2020     08/28/2020    CO2 20 08/28/2020    BUN 39 (H) 08/28/2020    CREATININE 1.93 (H) 08/28/2020    GLUCOSE 221 (H) 08/28/2020    CALCIUM 9.2 08/28/2020    PROT 7.8 05/22/2020    LABALBU 4.2 05/22/2020    BILITOT 0.72 05/22/2020    ALKPHOS 95 05/22/2020    AST 20 05/22/2020    ALT 25 05/22/2020    LABGLOM 33 (L) 08/28/2020    GFRAA 40 (L) 08/28/2020     Lab Results   Component Value Date    LABA1C 6.4 (H) 05/22/2020     Lab Results   Component Value Date     05/22/2020     Nutrition Interventions:   Food and/or Nutrient Delivery:  Continue Current Diet, Start Oral Nutrition Supplement  Nutrition Education/Counseling:  Education initiated   Coordination of Nutrition Care:  Continued Inpatient Monitoring    Goals:  PO >75% meals and supplements       Nutrition Monitoring and Evaluation:   Behavioral-Environmental Outcomes:  Knowledge or Skill   Food/Nutrient Intake Outcomes:  Food and Nutrient Intake, Supplement Intake  Physical Signs/Symptoms Outcomes:  Biochemical Data, Weight     Discharge Planning:    Continue current diet     Electronically signed by Berto Adair RD, LD on 8/28/20 at 10:14 AM EDT    Contact: [phone]

## 2020-08-28 NOTE — PLAN OF CARE
Problem: Skin Integrity:  Goal: Will show no infection signs and symptoms  Description: Will show no infection signs and symptoms  Outcome: Met This Shift  Goal: Absence of new skin breakdown  Description: Absence of new skin breakdown  Outcome: Met This Shift     Problem: Falls - Risk of:  Goal: Will remain free from falls  Description: Will remain free from falls  Outcome: Met This Shift  Goal: Absence of physical injury  Description: Absence of physical injury  Outcome: Met This Shift     Problem: OXYGENATION/RESPIRATORY FUNCTION  Goal: Patient will maintain patent airway  Outcome: Met This Shift  Goal: Patient will achieve/maintain normal respiratory rate/effort  Description: Respiratory rate and effort will be within normal limits for the patient  Outcome: Met This Shift     Problem: HEMODYNAMIC STATUS  Goal: Patient has stable vital signs and fluid balance  Outcome: Met This Shift     Problem: ACTIVITY INTOLERANCE/IMPAIRED MOBILITY  Goal: Mobility/activity is maintained at optimum level for patient  Outcome: Met This Shift

## 2020-08-28 NOTE — PROGRESS NOTES
Patient's wife, Lilibeth Heck, calls in to check on patient. POC discussed. Lilibeth Heck states, \"I know it'll be just a matter of time before the fluid on his lungs overtakes him. \" 1:1 provided along with education.

## 2020-08-28 NOTE — H&P
Hospital Medicine  History and Physical    Patient:  Juanna Alpers  MRN: [id number]    CHIEF COMPLAINT:  Shortness of breath (SOB)    History Obtained From:  patient, electronic medical record  PCP: GENE SINGLETARY    HISTORY OF PRESENT ILLNESS:   The patient is a 80 y.o. male who presents to the ER with SOB. Donis, who had a stroke and has expressive aphasia, answers a lot of questions with yes and no response. According to EMR, he had been SOB for approximately 3 days. Donis denies any cold symptoms but states he had a cough x 3 days that was productive. During this time he denies fever or chills. He denies exposure to anyone sick or anyone with COVID. There has been no increase in lower leg swelling or pain. Donis denies having chest pain. Appetite had not been as good with episodes of nausea. He states his bowels have been moving and he denies any trouble urinating. With the worsening SOB he was brought to the ER via EMS who found his oxygen saturation at 89% on room air. In the ER his BMP showed a potassium of 5.5, Chloride of 110, TCO2 of 19, BUN of 35, and creatinine of 1.83. BNP was 3,812 with a Troponin of 0.04. CBC showed a WBC of 14.1, Hgb 10.9, and Plt ct of 351. UA was negative for infection. COVID was negative. CXR showed early interstitial edema suggestive of CHF. He was also found to have atelectasis vs infiltrate in the LLL with small bilateral effusions. ECG showed normal sinus, normal ECG. Donis is admitted with a Cardiology consult, IV diuretic, and IV antibiotics.       Past Medical History:        Diagnosis Date    Cerebrovascular disease     Diverticula of colon 6/23/10    scattered, mid transverse colon    H/O ischemic left MCA stroke 12/2016    Hyperlipidemia     Hypertension     Polyp of colon 1999    mucosal    Tonsil, abscess        Past Surgical History:        Procedure Laterality Date    COLONOSCOPY  6/23/10    mucosal polyp 1999, diverticula    Allergies:  Zosyn [piperacillin sod-tazobactam so]    Social History:   TOBACCO:   reports that he has never smoked. He has never used smokeless tobacco.  ETOH:   reports no history of alcohol use. OCCUPATION:      Family History:   History reviewed. No pertinent family history. REVIEW OF SYSTEMS:  Constitutional:  negative for  fevers and chills  HEENT:  negative for  ear drainage, earaches, nasal congestion and sore throat  Neck:  No lumps or masses  Cardiac:  positive for  dyspnea  negative for  chest pain, palpitations  Respiratory:  negative for  wheezing, hemoptysis and positive for cough with production. Gastrointestinal:  negative for change in bowel habits, abdominal pain and positive for nausea and decreased appetite. :  negative for frequency, dysuria and hesitancy  Musculoskeletal:  positive for  muscle weakness  Neuro:  H/O CVA with expressive aphasia      Physical Exam:    Vitals: BP (!) 157/61   Pulse 73   Temp 99 °F (37.2 °C) (Oral)   Resp 16   Ht 6' (1.829 m)   Wt 182 lb 4.8 oz (82.7 kg)   SpO2 95%   BMI 24.72 kg/m²   General appearance: alert, appears stated age and cooperative  Skin: Skin color, texture, turgor normal. No rashes or lesions  HEENT: Head: Normocephalic, no lesions, without obvious abnormality. Eye: Normal external eye, conjunctiva, lids cornea, YVES. Ears: Normal TM's bilaterally. Normal auditory canals and external ears. Non-tender. Nose: Normal external nose, mucus membranes and septum. Nose: Nasal oxygen on. Pharynx: Upper dentures, oral mucosa appears dry, no oral lesions, posterior pharynx without erythema. Neck: no adenopathy, supple, symmetrical, trachea midline and Cardiac murmur that radiates to the carotids. Lungs: clear to auscultation bilaterally  Heart: regular rate and rhythm, S1, S2 normal and III/VI holosystolic murmur.   Abdomen: soft, non-tender; bowel sounds normal; no masses,  no organomegaly  Extremities: extremities normal, atraumatic, no cyanosis or edema  Neurologic: Mental status: Alert, responds appropriately, unable to test orientation as Donis has expressive aphasia. CBC:   Recent Labs     08/27/20  0725   WBC 14.1*   HGB 10.9*        BMP:    Recent Labs     08/27/20  0725      K 5.5*   *   CO2 19*   BUN 35*   CREATININE 1.83*   GLUCOSE 180*     Hepatic: No results for input(s): AST, ALT, ALB, BILITOT, ALKPHOS in the last 72 hours. Troponin: No results for input(s): TROPONINI in the last 72 hours. BNP: No results for input(s): BNP in the last 72 hours. Lipids: No results for input(s): CHOL, HDL in the last 72 hours. Invalid input(s): LDLCALCU  ABGs: No results found for: PHART, PO2ART, PRR4UVW  INR:   Recent Labs     08/27/20  0725   INR 2.0     -----------------------------------------------------------------      Assessment and Plan   1. Left lower lobe pneumonia - patient has symptoms of pneumonia with an elevated WBC, low grade fever, and sputum production. Started on IV Levaquin, mucinex, acapella, and aerosol treatments with Duoneb. 2.  Mild pulmonary edema secondary to Aortic stenosis - Donis appears dry to me on his physical exam.  Will be cautious with diuresis. EF is normal on ECHO. 3.  Hypoxia - controlled on nasal oxygen. 4.  CKD stage III - creatinine at baseline. 5.  Hyperkalemia - potasium 5.5 upon admission. 6.  Elevated troponin with normal EF and ECG. Elevated secondary to renal function. 7.  S/P CVA with expressive aphasia. 8. HTN - BP controlled. 9.  H/O Depression / anxiety - controlled on Celexa. 10.  DM II - on Lantus and Humalog sliding scale. 11.  Hyperlipidemia - on Lipitor. 12.  H/O Atrial fib - on coumadin. Plan:  1. Admit to the hospital on cardiac monitor. 2.  IV diuretics. 3.  Consult to Dr. Elin Dooley. 4.  IV Levaquin daily (adjusted to renal function). 5.  Duonebs every 4 hours W/A with Acapella QID. 6.  Oxygen to keep saturations > 90%.   7. Pharmacy to manage coumadin. 8.  DNR CC   9. Continue previous home medication. Medical Necessity: In patient is appropriate for this patient secondary to the need for IV diuretics and IV antibiotic. Estimated length of stay 3 days. The beneficiary may reasonably be expected to be discharged or transferred to a hospital within 96 hours after admission.       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    Dysarthria R47.1    Global aphasia R47.01    Hypertensive emergency I16.1    Ex-smoker Z87.891    Type 2 diabetes mellitus with complication ([facility]) T15.8    TENZIN (acute kidney injury) ([facility] N17.9    Cerebral edema ([facility]) G93.6    Acute respiratory failure with hypoxia ([facility]) J96.01    Atelectasis J98.11    Fever R50.9    New onset a-fib ([facility]) I48.91    Cerebral infarction due to embolism of left middle cerebral artery ([facility]) I63.412    Right hemiparesis ([facility]) G81.91    Mobility impaired Z74.09    Dysphagia following cerebrovascular accident I74.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

## 2020-08-29 LAB
-: NORMAL
AMORPHOUS: NORMAL
ANION GAP SERPL CALCULATED.3IONS-SCNC: 8 MMOL/L (ref 9–17)
BACTERIA: NORMAL
BILIRUBIN URINE: NEGATIVE
BUN BLDV-MCNC: 43 MG/DL (ref 8–23)
BUN/CREAT BLD: 23 (ref 9–20)
CALCIUM SERPL-MCNC: 9.3 MG/DL (ref 8.6–10.4)
CASTS UA: NORMAL /LPF
CHLORIDE BLD-SCNC: 106 MMOL/L (ref 98–107)
CO2: 22 MMOL/L (ref 20–31)
COLOR: YELLOW
COMMENT UA: ABNORMAL
CREAT SERPL-MCNC: 1.84 MG/DL (ref 0.7–1.2)
CRYSTALS, UA: NORMAL /HPF
EKG ATRIAL RATE: 48 BPM
EKG P AXIS: -20 DEGREES
EKG P-R INTERVAL: 210 MS
EKG Q-T INTERVAL: 508 MS
EKG QRS DURATION: 74 MS
EKG QTC CALCULATION (BAZETT): 453 MS
EKG R AXIS: 30 DEGREES
EKG T AXIS: 8 DEGREES
EKG VENTRICULAR RATE: 48 BPM
EPITHELIAL CELLS UA: NORMAL /HPF
GFR AFRICAN AMERICAN: 42 ML/MIN
GFR NON-AFRICAN AMERICAN: 35 ML/MIN
GFR SERPL CREATININE-BSD FRML MDRD: ABNORMAL ML/MIN/{1.73_M2}
GFR SERPL CREATININE-BSD FRML MDRD: ABNORMAL ML/MIN/{1.73_M2}
GLUCOSE BLD-MCNC: 101 MG/DL (ref 65–99)
GLUCOSE BLD-MCNC: 115 MG/DL (ref 70–99)
GLUCOSE BLD-MCNC: 135 MG/DL (ref 65–99)
GLUCOSE BLD-MCNC: 206 MG/DL (ref 65–99)
GLUCOSE BLD-MCNC: 262 MG/DL (ref 65–99)
GLUCOSE URINE: NEGATIVE
INR BLD: 2.3
KETONES, URINE: NEGATIVE
LEUKOCYTE ESTERASE, URINE: NEGATIVE
MUCUS: NORMAL
NITRITE, URINE: NEGATIVE
OTHER OBSERVATIONS UA: NORMAL
PH UA: 5 (ref 5–8)
POTASSIUM SERPL-SCNC: 4.6 MMOL/L (ref 3.7–5.3)
PROTEIN UA: ABNORMAL
PROTHROMBIN TIME: 24.7 SEC (ref 11.5–14.2)
RBC UA: NORMAL /HPF (ref 0–2)
RENAL EPITHELIAL, UA: NORMAL /HPF
SODIUM BLD-SCNC: 136 MMOL/L (ref 135–144)
SPECIFIC GRAVITY UA: 1.01 (ref 1–1.03)
TRICHOMONAS: NORMAL
TURBIDITY: CLEAR
URINE HGB: NEGATIVE
UROBILINOGEN, URINE: NORMAL
WBC UA: NORMAL /HPF
YEAST: NORMAL

## 2020-08-29 PROCEDURE — 81001 URINALYSIS AUTO W/SCOPE: CPT

## 2020-08-29 PROCEDURE — 1200000000 HC SEMI PRIVATE

## 2020-08-29 PROCEDURE — 6370000000 HC RX 637 (ALT 250 FOR IP): Performed by: INTERNAL MEDICINE

## 2020-08-29 PROCEDURE — 36415 COLL VENOUS BLD VENIPUNCTURE: CPT

## 2020-08-29 PROCEDURE — 94669 MECHANICAL CHEST WALL OSCILL: CPT

## 2020-08-29 PROCEDURE — 94761 N-INVAS EAR/PLS OXIMETRY MLT: CPT

## 2020-08-29 PROCEDURE — 2700000000 HC OXYGEN THERAPY PER DAY

## 2020-08-29 PROCEDURE — 82947 ASSAY GLUCOSE BLOOD QUANT: CPT

## 2020-08-29 PROCEDURE — 2580000003 HC RX 258: Performed by: INTERNAL MEDICINE

## 2020-08-29 PROCEDURE — 93010 ELECTROCARDIOGRAM REPORT: CPT | Performed by: INTERNAL MEDICINE

## 2020-08-29 PROCEDURE — 80048 BASIC METABOLIC PNL TOTAL CA: CPT

## 2020-08-29 PROCEDURE — 6360000002 HC RX W HCPCS: Performed by: INTERNAL MEDICINE

## 2020-08-29 PROCEDURE — 94640 AIRWAY INHALATION TREATMENT: CPT

## 2020-08-29 PROCEDURE — 85610 PROTHROMBIN TIME: CPT

## 2020-08-29 RX ORDER — WARFARIN SODIUM 5 MG/1
5 TABLET ORAL
Status: COMPLETED | OUTPATIENT
Start: 2020-08-29 | End: 2020-08-29

## 2020-08-29 RX ADMIN — SALINE NASAL SPRAY 2 SPRAY: 1.5 SOLUTION NASAL at 15:12

## 2020-08-29 RX ADMIN — METOPROLOL TARTRATE 50 MG: 50 TABLET, FILM COATED ORAL at 08:51

## 2020-08-29 RX ADMIN — Medication 1 CAPSULE: at 08:51

## 2020-08-29 RX ADMIN — HYDRALAZINE HYDROCHLORIDE 50 MG: 25 TABLET, FILM COATED ORAL at 08:51

## 2020-08-29 RX ADMIN — ATORVASTATIN CALCIUM 40 MG: 40 TABLET, FILM COATED ORAL at 19:55

## 2020-08-29 RX ADMIN — SODIUM CHLORIDE, PRESERVATIVE FREE 10 ML: 5 INJECTION INTRAVENOUS at 08:51

## 2020-08-29 RX ADMIN — INSULIN LISPRO 2 UNITS: 100 INJECTION, SOLUTION INTRAVENOUS; SUBCUTANEOUS at 11:38

## 2020-08-29 RX ADMIN — HYDRALAZINE HYDROCHLORIDE 50 MG: 25 TABLET, FILM COATED ORAL at 15:10

## 2020-08-29 RX ADMIN — GUAIFENESIN 600 MG: 600 TABLET, EXTENDED RELEASE ORAL at 19:55

## 2020-08-29 RX ADMIN — LEVOFLOXACIN 250 MG: 5 INJECTION, SOLUTION INTRAVENOUS at 19:54

## 2020-08-29 RX ADMIN — SALINE NASAL SPRAY 2 SPRAY: 1.5 SOLUTION NASAL at 19:54

## 2020-08-29 RX ADMIN — AMLODIPINE BESYLATE 5 MG: 5 TABLET ORAL at 08:51

## 2020-08-29 RX ADMIN — GUAIFENESIN 600 MG: 600 TABLET, EXTENDED RELEASE ORAL at 08:51

## 2020-08-29 RX ADMIN — SENNOSIDES 17.2 MG: 8.6 TABLET, FILM COATED ORAL at 19:55

## 2020-08-29 RX ADMIN — CITALOPRAM HYDROBROMIDE 20 MG: 20 TABLET ORAL at 08:51

## 2020-08-29 RX ADMIN — FUROSEMIDE 20 MG: 10 INJECTION, SOLUTION INTRAMUSCULAR; INTRAVENOUS at 08:51

## 2020-08-29 RX ADMIN — Medication 1 CAPSULE: at 15:10

## 2020-08-29 RX ADMIN — IPRATROPIUM BROMIDE AND ALBUTEROL SULFATE 3 ML: .5; 3 SOLUTION RESPIRATORY (INHALATION) at 12:52

## 2020-08-29 RX ADMIN — HYDRALAZINE HYDROCHLORIDE 50 MG: 25 TABLET, FILM COATED ORAL at 19:55

## 2020-08-29 RX ADMIN — SENNOSIDES 17.2 MG: 8.6 TABLET, FILM COATED ORAL at 08:50

## 2020-08-29 RX ADMIN — INSULIN LISPRO 2 UNITS: 100 INJECTION, SOLUTION INTRAVENOUS; SUBCUTANEOUS at 20:05

## 2020-08-29 RX ADMIN — SODIUM CHLORIDE, PRESERVATIVE FREE 10 ML: 5 INJECTION INTRAVENOUS at 19:54

## 2020-08-29 RX ADMIN — ACETAMINOPHEN 650 MG: 325 TABLET, FILM COATED ORAL at 23:34

## 2020-08-29 RX ADMIN — INSULIN GLARGINE 8 UNITS: 100 INJECTION, SOLUTION SUBCUTANEOUS at 08:46

## 2020-08-29 RX ADMIN — IPRATROPIUM BROMIDE AND ALBUTEROL SULFATE 3 ML: .5; 3 SOLUTION RESPIRATORY (INHALATION) at 21:05

## 2020-08-29 RX ADMIN — WARFARIN SODIUM 5 MG: 5 TABLET ORAL at 18:44

## 2020-08-29 RX ADMIN — IPRATROPIUM BROMIDE AND ALBUTEROL SULFATE 3 ML: .5; 3 SOLUTION RESPIRATORY (INHALATION) at 06:05

## 2020-08-29 RX ADMIN — INSULIN GLARGINE 8 UNITS: 100 INJECTION, SOLUTION SUBCUTANEOUS at 20:05

## 2020-08-29 RX ADMIN — SALINE NASAL SPRAY 2 SPRAY: 1.5 SOLUTION NASAL at 08:58

## 2020-08-29 ASSESSMENT — PAIN SCALES - GENERAL
PAINLEVEL_OUTOF10: 0
PAINLEVEL_OUTOF10: 1

## 2020-08-29 NOTE — PROGRESS NOTES
Hospitalist Progress Note  8/29/2020 6:46 AM  Subjective:   Admit Date: 8/27/2020  PCP: Antonina Hancock    Interval History: Arlyce states he is feeling better. Cough has improved and he is feeling less SOB. Some bleeding from the right nares, coumadin was placed on hold. He denies any chest pain. He denies nausea with eating. Diet: DIET CARDIAC; Low Sodium (2 GM)  Dietary Nutrition Supplements: Diabetic Oral Supplement  Medications:   Scheduled Meds:   sodium chloride  2 spray Each Nostril Q6H    lactobacillus  1 capsule Oral BID WC    insulin lispro  0-6 Units Subcutaneous TID WC    insulin lispro  0-3 Units Subcutaneous Nightly    furosemide  20 mg Intravenous Daily    amLODIPine  5 mg Oral Daily    atorvastatin  40 mg Oral Nightly    citalopram  20 mg Oral Daily    hydrALAZINE  50 mg Oral TID    insulin glargine  8 Units Subcutaneous BID    metoprolol tartrate  50 mg Oral Daily    senna  2 tablet Oral BID    sodium chloride flush  10 mL Intravenous 2 times per day    guaiFENesin  600 mg Oral BID    levofloxacin  250 mg Intravenous Q24H     Continuous Infusions:   dextrose         Patient's current medications documented, reviewed, and updated. CBC:   Recent Labs     08/27/20  0725 08/28/20  0600   WBC 14.1* 10.4   HGB 10.9* 9.9*    334     BMP:    Recent Labs     08/27/20  0725 08/28/20  0447 08/29/20  0552    136 136   K 5.5* 5.3 4.6   * 107 106   CO2 19* 20 22   BUN 35* 39* 43*   CREATININE 1.83* 1.93* 1.84*   GLUCOSE 180* 221* 115*     Hepatic: No results for input(s): AST, ALT, ALB, BILITOT, ALKPHOS in the last 72 hours. Troponin: No results for input(s): TROPONINI in the last 72 hours. BNP: No results for input(s): BNP in the last 72 hours. Lipids: No results for input(s): CHOL, HDL in the last 72 hours.     Invalid input(s): LDLCALCU  INR:   Recent Labs     08/27/20  0725 08/28/20  1235 08/29/20  0552   INR 2.0 2.5 2.3         Objective:   Vitals: BP (!) continues to improve. Patient continues to require in patient admission secondary to the need for IV antibiotic, nasal oxygen, and aerosol treatments.     Patient Active Problem List:     Mixed hyperlipidemia     Essential hypertension, benign     Acute ischemic left MCA stroke (HCC)     Right flaccid hemiplegia (HCC)     Dysarthria     Global aphasia     Hypertensive emergency     Ex-smoker     Type 2 diabetes mellitus with complication (HCC)     TENZIN (acute kidney injury) ([address]     Cerebral edema (HCC)     Acute respiratory failure with hypoxia (HCC)     Atelectasis     Fever     New onset a-fib (HCC)     Cerebral infarction due to embolism of left middle cerebral artery (HCC)     Right hemiparesis (HCC)     Mobility impaired     Dysphagia following cerebrovascular accident     Cerebrovascular accident (CVA) ([address]     CHF (congestive heart failure) ([address]     CHF with unknown LVEF ([address]     Pneumonia of left lower lobe due to infectious organism ([address]     Acute on chronic diastolic congestive heart failure (HCC)     CHF due to valvular disease ([address]     Acute on chronic systolic CHF (congestive heart failure) ([address]      Eduard Shoemaker MD  [facility] Hospitalist

## 2020-08-29 NOTE — PROGRESS NOTES
Dr. Be Elizondo notified of bloody sputum from patient sneezing. Per Dr. Be Elizondo will hold Coumadin for a few days.

## 2020-08-29 NOTE — PROGRESS NOTES
This nurse observes nasal cannula resting on patient's forehead. Placed to nostrils. Again this nurse returns later to find nasal cannula resting on patient's forehead. Patient states, \"no, no, no,\" upon attempt to replace. SpO2 at 89-90% on room air. Nasal cannula left off at this time per patient request. Consumes 50% of breakfast without incident. Denies pain or needs. Nasal spray given, patient blows nose with bloody drainage on tissue.

## 2020-08-30 VITALS
WEIGHT: 174 LBS | TEMPERATURE: 99 F | DIASTOLIC BLOOD PRESSURE: 62 MMHG | HEIGHT: 72 IN | RESPIRATION RATE: 20 BRPM | OXYGEN SATURATION: 90 % | SYSTOLIC BLOOD PRESSURE: 144 MMHG | BODY MASS INDEX: 23.57 KG/M2 | HEART RATE: 77 BPM

## 2020-08-30 LAB
ABSOLUTE EOS #: 0.2 K/UL (ref 0–0.4)
ABSOLUTE IMMATURE GRANULOCYTE: ABNORMAL K/UL (ref 0–0.3)
ABSOLUTE LYMPH #: 0.9 K/UL (ref 1–4.8)
ABSOLUTE MONO #: 0.9 K/UL (ref 0–1)
ANION GAP SERPL CALCULATED.3IONS-SCNC: 7 MMOL/L (ref 9–17)
BASOPHILS # BLD: 0 % (ref 0–2)
BASOPHILS ABSOLUTE: 0 K/UL (ref 0–0.2)
BUN BLDV-MCNC: 37 MG/DL (ref 8–23)
BUN/CREAT BLD: 22 (ref 9–20)
CALCIUM SERPL-MCNC: 8.8 MG/DL (ref 8.6–10.4)
CHLORIDE BLD-SCNC: 104 MMOL/L (ref 98–107)
CO2: 23 MMOL/L (ref 20–31)
CREAT SERPL-MCNC: 1.69 MG/DL (ref 0.7–1.2)
DIFFERENTIAL TYPE: YES
EOSINOPHILS RELATIVE PERCENT: 3 % (ref 0–5)
GFR AFRICAN AMERICAN: 47 ML/MIN
GFR NON-AFRICAN AMERICAN: 39 ML/MIN
GFR SERPL CREATININE-BSD FRML MDRD: ABNORMAL ML/MIN/{1.73_M2}
GFR SERPL CREATININE-BSD FRML MDRD: ABNORMAL ML/MIN/{1.73_M2}
GLUCOSE BLD-MCNC: 154 MG/DL (ref 70–99)
GLUCOSE BLD-MCNC: 97 MG/DL (ref 65–99)
HCT VFR BLD CALC: 30.9 % (ref 41–53)
HEMOGLOBIN: 10.3 G/DL (ref 13.5–17.5)
IMMATURE GRANULOCYTES: ABNORMAL %
INR BLD: 2.2
LYMPHOCYTES # BLD: 11 % (ref 13–44)
MCH RBC QN AUTO: 31.1 PG (ref 26–34)
MCHC RBC AUTO-ENTMCNC: 33.2 G/DL (ref 31–37)
MCV RBC AUTO: 93.7 FL (ref 80–100)
MONOCYTES # BLD: 12 % (ref 5–9)
NRBC AUTOMATED: ABNORMAL PER 100 WBC
PDW BLD-RTO: 14.9 % (ref 12.1–15.2)
PLATELET # BLD: 384 K/UL (ref 140–450)
PLATELET ESTIMATE: ABNORMAL
PMV BLD AUTO: ABNORMAL FL (ref 6–12)
POTASSIUM SERPL-SCNC: 4.2 MMOL/L (ref 3.7–5.3)
PROTHROMBIN TIME: 23.6 SEC (ref 11.5–14.2)
RBC # BLD: 3.3 M/UL (ref 4.5–5.9)
RBC # BLD: ABNORMAL 10*6/UL
SEG NEUTROPHILS: 74 % (ref 39–75)
SEGMENTED NEUTROPHILS ABSOLUTE COUNT: 5.5 K/UL (ref 2.1–6.5)
SODIUM BLD-SCNC: 134 MMOL/L (ref 135–144)
WBC # BLD: 7.5 K/UL (ref 3.5–11)
WBC # BLD: ABNORMAL 10*3/UL

## 2020-08-30 PROCEDURE — 94669 MECHANICAL CHEST WALL OSCILL: CPT

## 2020-08-30 PROCEDURE — 80048 BASIC METABOLIC PNL TOTAL CA: CPT

## 2020-08-30 PROCEDURE — 94640 AIRWAY INHALATION TREATMENT: CPT

## 2020-08-30 PROCEDURE — 6360000002 HC RX W HCPCS: Performed by: INTERNAL MEDICINE

## 2020-08-30 PROCEDURE — 85025 COMPLETE CBC W/AUTO DIFF WBC: CPT

## 2020-08-30 PROCEDURE — 82947 ASSAY GLUCOSE BLOOD QUANT: CPT

## 2020-08-30 PROCEDURE — 6370000000 HC RX 637 (ALT 250 FOR IP): Performed by: INTERNAL MEDICINE

## 2020-08-30 PROCEDURE — 94761 N-INVAS EAR/PLS OXIMETRY MLT: CPT

## 2020-08-30 PROCEDURE — 2580000003 HC RX 258: Performed by: INTERNAL MEDICINE

## 2020-08-30 PROCEDURE — 36415 COLL VENOUS BLD VENIPUNCTURE: CPT

## 2020-08-30 PROCEDURE — 85610 PROTHROMBIN TIME: CPT

## 2020-08-30 RX ORDER — WARFARIN SODIUM 5 MG/1
5 TABLET ORAL
Status: DISCONTINUED | OUTPATIENT
Start: 2020-08-30 | End: 2020-08-30 | Stop reason: HOSPADM

## 2020-08-30 RX ORDER — GUAIFENESIN 600 MG/1
600 TABLET, EXTENDED RELEASE ORAL 2 TIMES DAILY
Qty: 30 TABLET | Refills: 0
Start: 2020-08-30

## 2020-08-30 RX ORDER — LACTOBACILLUS RHAMNOSUS GG 10B CELL
1 CAPSULE ORAL 2 TIMES DAILY WITH MEALS
Qty: 30 CAPSULE | Refills: 0
Start: 2020-08-30

## 2020-08-30 RX ORDER — LEVOFLOXACIN 250 MG/1
250 TABLET ORAL DAILY
Qty: 5 TABLET | Refills: 0 | Status: SHIPPED | OUTPATIENT
Start: 2020-08-30 | End: 2020-09-04

## 2020-08-30 RX ADMIN — GUAIFENESIN 600 MG: 600 TABLET, EXTENDED RELEASE ORAL at 09:13

## 2020-08-30 RX ADMIN — SALINE NASAL SPRAY 2 SPRAY: 1.5 SOLUTION NASAL at 09:13

## 2020-08-30 RX ADMIN — FUROSEMIDE 20 MG: 10 INJECTION, SOLUTION INTRAMUSCULAR; INTRAVENOUS at 09:13

## 2020-08-30 RX ADMIN — AMLODIPINE BESYLATE 5 MG: 5 TABLET ORAL at 09:13

## 2020-08-30 RX ADMIN — Medication 1 CAPSULE: at 10:16

## 2020-08-30 RX ADMIN — SENNOSIDES 17.2 MG: 8.6 TABLET, FILM COATED ORAL at 09:13

## 2020-08-30 RX ADMIN — METOPROLOL TARTRATE 50 MG: 50 TABLET, FILM COATED ORAL at 09:13

## 2020-08-30 RX ADMIN — HYDRALAZINE HYDROCHLORIDE 50 MG: 25 TABLET, FILM COATED ORAL at 09:13

## 2020-08-30 RX ADMIN — SODIUM CHLORIDE, PRESERVATIVE FREE 10 ML: 5 INJECTION INTRAVENOUS at 09:12

## 2020-08-30 RX ADMIN — INSULIN GLARGINE 8 UNITS: 100 INJECTION, SOLUTION SUBCUTANEOUS at 09:12

## 2020-08-30 RX ADMIN — CITALOPRAM HYDROBROMIDE 20 MG: 20 TABLET ORAL at 09:13

## 2020-08-30 ASSESSMENT — PAIN SCALES - GENERAL
PAINLEVEL_OUTOF10: 0
PAINLEVEL_OUTOF10: 0

## 2020-08-30 NOTE — DISCHARGE SUMMARY
Hospitalist Discharge Summary    Saul Toledo  :  1934  MRN:  [id number]    Admit date:  2020  Discharge date:  20    Admitting Physician:  Colletta Heater, MD    Discharge Diagnoses:   LLL Pneumonia. Hypoxia - resolved. Mild pulmonary edema - resolved. Moderate severe aortic stenosis. Moderate severe pulmonary HTN. Admission Condition:  poor      Discharged Condition:  good    Hospital Course: The patient is a 80 y.o. male who presents to the ER with SOB. Donis, who had a stroke and has expressive aphasia, answers a lot of questions with yes and no response. According to EMR, he had been SOB for approximately 3 days. Donis denies any cold symptoms but states he had a cough x 3 days that was productive. During this time he denies fever or chills. He denies exposure to anyone sick or anyone with COVID. There has been no increase in lower leg swelling or pain. Donis denies having chest pain. Appetite had not been as good with episodes of nausea. He states his bowels have been moving and he denies any trouble urinating. With the worsening SOB he was brought to the ER via EMS who found his oxygen saturation at 89% on room air. In the ER his BMP showed a potassium of 5.5, Chloride of 110, TCO2 of 19, BUN of 35, and creatinine of 1.83. BNP was 3,812 with a Troponin of 0.04. CBC showed a WBC of 14.1, Hgb 10.9, and Plt ct of 351. UA was negative for infection. COVID was negative. CXR showed early interstitial edema suggestive of CHF. He was also found to have atelectasis vs infiltrate in the LLL with small bilateral effusions. ECG showed normal sinus, normal ECG. Donis was admitted on IV Lasix. Dr. Ariana Angelo in Cardiology was consulted and performed an ECHO which showed normal LV function, moderate severe aortic stenosis and pulmonary HTN. Nasal oxygen was placed to keep oxygen saturations > 90%.   Dr. Ariana Angelo felt his symptoms were more from pneumonia and not CHF.  Donis was started on IV Levaquin daily with mucinex, acapella, and Duoneb aerosols. UA was repeated at one point secondary to low grade fever which was normal.  Donis did have episodes of right nares epistaxis. With the use of nasal saline spray, the epistaxis resolved. Nasal oxygen was weaned off with his SPO2 maintaining in the 90's. His WBC and temperature normalized. Donis reported that his breathing felt to be back to normal.   With his improvement it was felt he could be discharged home to continue on an oral antibiotic. Discharge Exam:    Vitals: /67   Pulse 75   Temp 98.6 °F (37 °C) (Oral)   Resp 20   Ht 6' (1.829 m)   Wt 174 lb (78.9 kg)   SpO2 91%   BMI 23.60 kg/m²   General appearance: alert and cooperative with exam  HEENT: Head: Normocephalic, no lesions, without obvious abnormality. Eye: Normal external eye, conjunctiva, lids cornea, YVES. Nose: Normal external nose, mucus membranes and septum. Neck: no adenopathy, supple, symmetrical, trachea midline and Cardiac murmur that radiates to carotids. Lungs: clear to auscultation bilaterally  Heart: regular rate and rhythm, S1, S2 normal and II/VI systolic murmur.   Abdomen: soft, non-tender; bowel sounds normal; no masses,  no organomegaly  Extremities: extremities normal, atraumatic, no cyanosis or edema  Neurologic: Mental status: Alert, very interactive, follows commands    Discharge Medications:       Donis Shelton   Home Medication Instructions SONNY:[id number]    Printed on:08/30/20 0620   Medication Information                      acetaminophen (TYLENOL) 500 MG tablet  Take 1,000 mg by mouth 3 times daily             amLODIPine (NORVASC) 5 MG tablet  Take 1 tablet by mouth daily             atorvastatin (LIPITOR) 40 MG tablet  Take 1 tablet by mouth nightly             citalopram (CELEXA) 10 MG tablet  Take 20 mg by mouth 2 times daily              furosemide (LASIX) 20 MG tablet  Take 40 mg by mouth daily HAYDER in 1 week. Discharge time =  32 minutes.      Signed:  Eduard Back  8/30/2020, 6:20 AM

## 2020-08-30 NOTE — PROGRESS NOTES
Discharge instructions provided to patient at bedside and to patient's wife via telephone. Verbalized understanding of follow up appointment,  diet, activity, medications and reasons to return to ED/call physician. All questions answered. Copy of discharge instructions provided. Patient's belongings: wedding ring, nasal spray, dentures and denture cup sent with patient upon discharge. Awaiting for patient's caretaker to arrive with clothes and shoes.

## 2020-08-30 NOTE — PROGRESS NOTES
Hospitalist Progress Note  8/30/2020 6:00 AM  Subjective:   Admit Date: 8/27/2020  PCP: Carlo Altamirano    Interval History: Donis has no complaints. He feels his cough is improving with little sputum. NO chest pain or SOB. Oxygen has been weaned off with his oxygen saturations > 90%. Low grade fever yesterday afternoon. Recheck urine which was normal.  WBC normal and no temperature this am.  Briseida feels he is ready to go home. Diet: DIET CARDIAC; Low Sodium (2 GM)  Dietary Nutrition Supplements: Diabetic Oral Supplement  Medications:   Scheduled Meds:   sodium chloride  2 spray Each Nostril Q6H    warfarin (COUMADIN) daily dosing (placeholder)   Other RX Placeholder    lactobacillus  1 capsule Oral BID WC    insulin lispro  0-6 Units Subcutaneous TID WC    insulin lispro  0-3 Units Subcutaneous Nightly    furosemide  20 mg Intravenous Daily    amLODIPine  5 mg Oral Daily    atorvastatin  40 mg Oral Nightly    citalopram  20 mg Oral Daily    hydrALAZINE  50 mg Oral TID    insulin glargine  8 Units Subcutaneous BID    metoprolol tartrate  50 mg Oral Daily    senna  2 tablet Oral BID    sodium chloride flush  10 mL Intravenous 2 times per day    guaiFENesin  600 mg Oral BID    levofloxacin  250 mg Intravenous Q24H     Continuous Infusions:   dextrose         Patient's current medications documented, reviewed, and updated. CBC:   Recent Labs     08/27/20  0725 08/28/20  0600 08/30/20  0505   WBC 14.1* 10.4 7.5   HGB 10.9* 9.9* 10.3*    334 384     BMP:    Recent Labs     08/28/20  0447 08/29/20  0552 08/30/20  0505    136 134*   K 5.3 4.6 4.2    106 104   CO2 20 22 23   BUN 39* 43* 37*   CREATININE 1.93* 1.84* 1.69*   GLUCOSE 221* 115* 154*     Hepatic: No results for input(s): AST, ALT, ALB, BILITOT, ALKPHOS in the last 72 hours. Troponin: No results for input(s): TROPONINI in the last 72 hours. BNP: No results for input(s): BNP in the last 72 hours.   Lipids: No results for input(s): CHOL, HDL in the last 72 hours. Invalid input(s): LDLCALCU  INR:   Recent Labs     08/28/20  1235 08/29/20  0552 08/30/20  0505   INR 2.5 2.3 2.2         Objective:   Vitals: /67   Pulse 75   Temp 98.6 °F (37 °C) (Oral)   Resp 20   Ht 6' (1.829 m)   Wt 174 lb (78.9 kg)   SpO2 91%   BMI 23.60 kg/m²   General appearance: alert and cooperative with exam  HEENT: Head: Normocephalic, no lesions, without obvious abnormality. Eye: Normal external eye, conjunctiva, lids cornea, YVES. Nose: Normal external nose, mucus membranes and septum. Neck: no adenopathy, supple, symmetrical, trachea midline and Cardiac murmur that radiates to carotids. Lungs: clear to auscultation bilaterally  Heart: regular rate and rhythm, S1, S2 normal and II/VI systolic murmur. Abdomen: soft, non-tender; bowel sounds normal; no masses,  no organomegaly  Extremities: extremities normal, atraumatic, no cyanosis or edema  Neurologic: Mental status: Alert, very interactive, follows commands    Assessment and Plan:   1.  Left lower lobe pneumonia - improving with WBC normalizing, improvement in fever, and sputum production.  Started on IV Levaquin, mucinex, acapella, and aerosol treatments with Duoneb. 2.  Mild pulmonary edema secondary to moderate severe Aortic stenosis - improved with IV diuretics. 3.  Hypoxia - improved. 4.  CKD stage III - creatinine at baseline. 5.  Hyperkalemia - improved. Potasium 5.5 upon admission. 6.  Elevated troponin with normal EF and ECG.  Elevated secondary to renal function. 7.  S/P CVA with expressive aphasia. 8.  HTN - BP controlled. 9.  H/O Depression / anxiety - controlled on Celexa. 10.  DM II - on Lantus and Humalog sliding scale. 6.  Hyperlipidemia - on Lipitor. 12.  H/O Atrial fib - on coumadin. 13.  Moderate severe pulmonary HTN. 14.  Epistaxis - improved. On nasal saline. Plan:  1. Discharge home to continue on oral antibiotic.         Patient Active Problem List:     Mixed hyperlipidemia     Essential hypertension, benign     Acute ischemic left MCA stroke (HCC)     Right flaccid hemiplegia (HCC)     Dysarthria     Global aphasia     Hypertensive emergency     Ex-smoker     Type 2 diabetes mellitus with complication (HCC)     TENZIN (acute kidney injury) ([address]     Cerebral edema (HCC)     Acute respiratory failure with hypoxia (HCC)     Atelectasis     Fever     New onset a-fib (HCC)     Cerebral infarction due to embolism of left middle cerebral artery (HCC)     Right hemiparesis (HCC)     Mobility impaired     Dysphagia following cerebrovascular accident     Cerebrovascular accident (CVA) ([address]     CHF (congestive heart failure) ([address]     CHF with unknown LVEF ([address]     Pneumonia of left lower lobe due to infectious organism ([address]     Acute on chronic diastolic congestive heart failure (HCC)     CHF due to valvular disease ([address]     Acute on chronic systolic CHF (congestive heart failure) ([address]      Eduard Shoemaker MD  [facility] Hospital[facility]

## 2020-08-31 NOTE — PROGRESS NOTES
Notified [facility] of pt discharge this morning to home yesterday.  Dom PUENTE 8/31/2020

## 2020-08-31 NOTE — ED PROVIDER NOTES
[facility] ED  EMERGENCY DEPARTMENT ENCOUNTER      Pt Name: Ana Caicedo  MRN: [id number]  [place] 1934  Date of evaluation: 4/22/2020  Provider: Bryan Connor MD    CHIEF COMPLAINT       Chief Complaint   Patient presents with    Rectal Bleeding     Pt arrives via EMS for rectal bleeding that started approx 30 minutes ago; Pt's wife arrived and states that pt battles constipation and takes senna on a daily basis. states that pt had bright red blood in his attends and in the toilet, wife unaware if there was any stool in with the blood;          HISTORY OF PRESENT ILLNESS   (Location/Symptom, Timing/Onset, Context/Setting, Quality, Duration, Modifying Factors, Severity)  Note limiting factors. Ana Caicedo is a 80 y.o. male who has a history of hyperlipidemia, hypertension, left MCA stroke, right flaccid hemiplegia, global aphasia, type 2 diabetes, kidney injury, atrial fibrillation, CHF, presents to the emergency department for evaluation and management of acute onset of rectal bleeding. Bleeding has stopped. The home health aide was reported to have given him a rectal suppository right before this occurred. He is mobility is impaired due to residual weakness. Code Status DNR-CC    HPI    Nursing Notes were reviewed. REVIEW OF SYSTEMS    (2-9 systems for level 4, 10 or more for level 5)       REVIEW OF SYSTEMS    Constitutional: Negative for fatigue and fever. Respiratory: Negative for cough, chest tightness and shortness of breath. Cardiovascular: Negative for chest pain, palpitations and leg swelling. Gastrointestinal: Positive for rectal bleed, negative for abdominal distention, abdominal pain, diarrhea, nausea and vomiting. Genitourinary: Negative for difficulty urinating, dysuria, hematuria and urgency. Musculoskeletal: Negative for arthralgias, back pain and neck pain. .   Hematological: Positive for anticoagulation, negative for adenopathy.    Except as noted Physical Exam  Physical Exam   Constitutional:  Appears well-developed and well-nourished. No distress noted. Non toxic in appearance. HENT:     Head: Normocephalic and atraumatic. Mouth/Throat: Oropharynx is clear and mucosa moist. No oropharyngeal exudate noted. Posterior pharynx is pink and noninjected. Eyes: Conjunctivae and EOM are normal. Pupils are equal,round, and reactive to light. No scleral icterus. Neck: Normal range of motion. Neck supple. No tracheal deviation present. Cardiovascular: Normal rate, regular rhythm, normal heartsounds and intact distal pulses. Exam reveals no gallop or friction rub. No murmur heard. Pulmonary/Chest: Effort normal and breath sounds are symmetric and normal. No respiratory distress. There are no wheezes, rales or rhonchi. No tenderness is exhibited upon palpation of the chest wall. Abdominal: Diaper in use with a large amount of seedy stool present. .  Soft. Bowel sounds are normal. No distension or no mass exhibitted. There is no tenderness, rebound, rigidity or guarding. Stool guaiac negative with satisfactory controls. There were no perianal fissures, hemorrhoids to explain bleeding. Digital Rectal exam reveals to palpable hemorrhoids. Stool without gross blood. Genitourinary:   No CVA tenderness noted on examination. Musculoskeletal: Examination shows that there is no spontaneous movement of the right upper or lower extremity. Normal range of motion of  the left upper and lower extremity. . No edema, tenderness or deformity. Lymphadenopathy:  No cervical adenopathy. Neurological:   alert and oriented to person, place, and time. [place]umgarten Speech impaired at baseline. comprehension is normal with, normal cognition,  Reflexes are normal.  There are no cranial nerve deficits. Normal muscle tone, motor and sensory function in the left side. Skin: Skin is warm and dry. No rash noted. No diaphoresis. No erythema. No pallor.    Psychiatric: Pleasant and PROCEDURES:  Laparoscopic cholecystectomy 31-Aug-2020 09:39:37  Teetee Moe

## 2020-11-26 ENCOUNTER — HOSPITAL ENCOUNTER (EMERGENCY)
Age: 85
Discharge: HOME | End: 2020-11-27
Attending: EMERGENCY MEDICINE
Payer: MEDICARE

## 2020-11-26 ENCOUNTER — APPOINTMENT (OUTPATIENT)
Dept: GENERAL RADIOLOGY | Age: 85
End: 2020-11-26
Payer: MEDICARE

## 2020-11-26 VITALS
OXYGEN SATURATION: 96 % | TEMPERATURE: 99.7 F | HEART RATE: 76 BPM | WEIGHT: 174 LBS | RESPIRATION RATE: 20 BRPM | BODY MASS INDEX: 23.6 KG/M2

## 2020-11-26 LAB
ABSOLUTE EOS #: 0 K/UL (ref 0–0.4)
ABSOLUTE IMMATURE GRANULOCYTE: ABNORMAL K/UL (ref 0–0.3)
ABSOLUTE LYMPH #: 0.7 K/UL (ref 1–4.8)
ABSOLUTE MONO #: 0.7 K/UL (ref 0–1)
ALBUMIN SERPL-MCNC: 3.6 G/DL (ref 3.5–5.2)
ALBUMIN/GLOBULIN RATIO: ABNORMAL (ref 1–2.5)
ALP BLD-CCNC: 78 U/L (ref 40–129)
ALT SERPL-CCNC: 14 U/L (ref 5–41)
ANION GAP SERPL CALCULATED.3IONS-SCNC: 12 MMOL/L (ref 9–17)
AST SERPL-CCNC: 23 U/L
BASOPHILS # BLD: 0 % (ref 0–2)
BASOPHILS ABSOLUTE: 0 K/UL (ref 0–0.2)
BILIRUB SERPL-MCNC: 0.31 MG/DL (ref 0.3–1.2)
BUN BLDV-MCNC: 36 MG/DL (ref 8–23)
BUN/CREAT BLD: 17 (ref 9–20)
CALCIUM SERPL-MCNC: 9.1 MG/DL (ref 8.6–10.4)
CHLORIDE BLD-SCNC: 102 MMOL/L (ref 98–107)
CO2: 18 MMOL/L (ref 20–31)
CREAT SERPL-MCNC: 2.09 MG/DL (ref 0.7–1.2)
DIFFERENTIAL TYPE: YES
EOSINOPHILS RELATIVE PERCENT: 0 % (ref 0–5)
GFR AFRICAN AMERICAN: 37 ML/MIN
GFR NON-AFRICAN AMERICAN: 30 ML/MIN
GFR SERPL CREATININE-BSD FRML MDRD: ABNORMAL ML/MIN/{1.73_M2}
GFR SERPL CREATININE-BSD FRML MDRD: ABNORMAL ML/MIN/{1.73_M2}
GLUCOSE BLD-MCNC: 223 MG/DL (ref 70–99)
HCT VFR BLD CALC: 32.2 % (ref 41–53)
HEMOGLOBIN: 10.8 G/DL (ref 13.5–17.5)
IMMATURE GRANULOCYTES: ABNORMAL %
LYMPHOCYTES # BLD: 18 % (ref 13–44)
MCH RBC QN AUTO: 31.6 PG (ref 26–34)
MCHC RBC AUTO-ENTMCNC: 33.6 G/DL (ref 31–37)
MCV RBC AUTO: 94.2 FL (ref 80–100)
MONOCYTES # BLD: 17 % (ref 5–9)
NRBC AUTOMATED: ABNORMAL PER 100 WBC
PDW BLD-RTO: 14.5 % (ref 12.1–15.2)
PLATELET # BLD: 297 K/UL (ref 140–450)
PLATELET ESTIMATE: ABNORMAL
PMV BLD AUTO: ABNORMAL FL (ref 6–12)
POTASSIUM SERPL-SCNC: 4.5 MMOL/L (ref 3.7–5.3)
RBC # BLD: 3.42 M/UL (ref 4.5–5.9)
RBC # BLD: ABNORMAL 10*6/UL
SEG NEUTROPHILS: 65 % (ref 39–75)
SEGMENTED NEUTROPHILS ABSOLUTE COUNT: 2.6 K/UL (ref 2.1–6.5)
SODIUM BLD-SCNC: 132 MMOL/L (ref 135–144)
TOTAL PROTEIN: 6.7 G/DL (ref 6.4–8.3)
WBC # BLD: 4 K/UL (ref 3.5–11)
WBC # BLD: ABNORMAL 10*3/UL

## 2020-11-26 PROCEDURE — 85025 COMPLETE CBC W/AUTO DIFF WBC: CPT

## 2020-11-26 PROCEDURE — 71045 X-RAY EXAM CHEST 1 VIEW: CPT

## 2020-11-26 PROCEDURE — 99284 EMERGENCY DEPT VISIT MOD MDM: CPT

## 2020-11-26 PROCEDURE — 80053 COMPREHEN METABOLIC PANEL: CPT

## 2020-11-26 ASSESSMENT — PAIN SCALES - GENERAL: PAINLEVEL_OUTOF10: 0

## 2020-11-27 NOTE — ED PROVIDER NOTES
EMERGENCY DEPARTMENT ENCOUNTER      CHIEF COMPLAINT    Chief Complaint   Patient presents with    Other     the [facility] told the patient to go to the ED if his HR dropped below 60       HPI    Steff Pérez is a 80 y.o. male who presentsto ED from home. By EMS. With complaint of Covid positive patient. [facility]. Patient's family called the [facility] hospital patient was referred to ED for evaluation. Patient has prior history of stroke.           PAST MEDICAL HISTORY    Past Medical History:   Diagnosis Date    Cerebrovascular disease     Diverticula of colon 6/23/10    scattered, mid transverse colon    H/O ischemic left MCA stroke 12/2016    Hyperlipidemia     Hypertension     Polyp of colon 1999    mucosal    Tonsil, abscess        SURGICAL HISTORY    Past Surgical History:   Procedure Laterality Date    COLONOSCOPY  6/23/10    mucosal polyp 1999, diverticula    CYSTOURETHROSCOPY      per Dr. Tish Jennings, 2012    GASTROSTOMY TUBE PLACEMENT  12/21/2016    HERNIA REPAIR         CURRENT MEDICATIONS    Current Outpatient Rx   Medication Sig Dispense Refill    lactobacillus (CULTURELLE) capsule Take 1 capsule by mouth 2 times daily (with meals) 30 capsule 0    guaiFENesin (MUCINEX) 600 MG extended release tablet Take 1 tablet by mouth 2 times daily 30 tablet 0    sodium chloride (OCEAN, BABY AYR) 0.65 % nasal spray 2 sprays by Nasal route every 6 hours 1 Bottle 0    potassium chloride (MICRO-K) 10 MEQ extended release capsule Take 10 mEq by mouth daily      metoprolol tartrate (LOPRESSOR) 25 MG tablet Take 0.5 tablets by mouth 2 times daily (Patient taking differently: Take 50 mg by mouth daily ) 60 tablet 3    lisinopril (PRINIVIL;ZESTRIL) 5 MG tablet Take 1 tablet by mouth daily 30 tablet 3    senna (SENOKOT) 8.6 MG tablet Take 2 tablets by mouth 2 times daily With breakfast and lunch      hydrALAZINE (APRESOLINE) 50 MG tablet Take 50 mg by mouth 3 times daily      acetaminophen (TYLENOL) 500 MG tablet Take 1,000 mg by mouth 3 times daily      furosemide (LASIX) 20 MG tablet Take 40 mg by mouth daily       citalopram (CELEXA) 10 MG tablet Take 20 mg by mouth 2 times daily       insulin glargine (LANTUS) 100 UNIT/ML injection vial Inject into the skin See Admin Instructions Take 8 units sub q in AM  Take 8 units sub q in PM      warfarin (COUMADIN) 5 MG tablet Take 5 mg by mouth See Admin Instructions 2.5MG on Mondays      atorvastatin (LIPITOR) 40 MG tablet Take 1 tablet by mouth nightly 30 tablet 3    amLODIPine (NORVASC) 5 MG tablet Take 1 tablet by mouth daily 30 tablet 3    ipratropium-albuterol (DUONEB) 0.5-2.5 (3) MG/3ML SOLN nebulizer solution Inhale 3 mLs into the lungs every 6 hours as needed for Shortness of Breath 360 mL 3       ALLERGIES    Allergies   Allergen Reactions    Zosyn [Piperacillin Sod-Tazobactam So] Rash       FAMILY HISTORY    History reviewed. No pertinent family history.     SOCIAL HISTORY    Social History     Socioeconomic History    Marital status:      Spouse name: None    Number of children: None    Years of education: None    Highest education level: None   Occupational History    None   Social Needs    Financial resource strain: None    Food insecurity     Worry: None     Inability: None    Transportation needs     Medical: None     Non-medical: None   Tobacco Use    Smoking status: Never Smoker    Smokeless tobacco: Never Used   Substance and Sexual Activity    Alcohol use: No    Drug use: No    Sexual activity: None   Lifestyle    Physical activity     Days per week: None     Minutes per session: None    Stress: None   Relationships    Social connections     Talks on phone: None     Gets together: None     Attends Yazidi service: None     Active member of club or organization: None     Attends meetings of clubs or organizations: None     Relationship status: None    Intimate partner violence     Fear of current or ex partner: None Emotionally abused: None     Physically abused: None     Forced sexual activity: None   Other Topics Concern    None   Social History Narrative    None       Has history of stroke, generalized weakness, Covid positive status. Review of Systems:  Constitutional: Positive for generalized weakness, history of stroke, Covid positive patient  Eyes:  Denies photophobia or discharge   HENT:  Denies sore throat or ear pain   Respiratory:  Denies cough or shortness of breath   Cardiovascular:  Denies chest pain, palpitations or swelling   GI:  Denies abdominal pain, nausea, vomiting, or diarrhea   Musculoskeletal:  Denies back pain   Skin:  Denies rash   Neurologic:  Denies headache, focal weakness or sensory changes   Endocrine:  Denies polyuria or polydypsia   Lymphatic:  Denies swollen glands   Psychiatric:  Denies depression, suicidal ideation or homicidal ideation   All systems negative except as marked. PHYSICAL EXAM    VITAL SIGNS: Pulse 76   Temp 99.7 °F (37.6 °C) (Oral)   Resp 20   Wt 174 lb (78.9 kg)   SpO2 96%   BMI 23.60 kg/m²    Constitutional: Elderly male no acute distress  HENT:  Normocephalic, Atraumatic, Bilateral external ears normal, Oropharynx moist, No oral exudates, Nose normal. Neck- Normal range of motion, No tenderness, Supple, No stridor. Eyes:  PERRL, EOMI, Conjunctiva normal, No discharge. Respiratory:  Normal breath sounds, No respiratory distress, No wheezing, No chest tenderness. Cardiovascular:  Normal heart rate, Normal rhythm, No murmurs, No rubs, No gallops. GI:  Bowel sounds normal, Soft, No tenderness, No masses, No pulsatile masses. : External genitalia appear normal, No masses or lesions. No discharge. No CVA tenderness. Musculoskeletal:  Intact distal pulses, No edema, No tenderness, No cyanosis, No clubbing. Good range of motion in all major joints. No tenderness to palpation or major deformities noted. Back- No tenderness.    Integument:  Warm, Dry, No erythema, No rash. Lymphatic:  No lymphadenopathy noted. Neurologic:  Alert & oriented x 3, patient has right upper and lower extremity weakness due to a previous stroke  Psychiatric:  Affect normal, Judgment normal, Mood normal.     EKG        RADIOLOGY    XR CHEST PORTABLE   Final Result   Mild increased pulmonary vascularity, which could represent    pulmonary vascular congestion. This is significantly less severe compared to    8/27/2020. There is also a possible small left pleural effusion, also less    prominent compared to the prior exam.             PROCEDURES        Labs  Labs Reviewed   COMPREHENSIVE METABOLIC PANEL - Abnormal; Notable for the following components:       Result Value    Glucose 223 (*)     BUN 36 (*)     CREATININE 2.09 (*)     Sodium 132 (*)     CO2 18 (*)     GFR Non- 30 (*)     GFR  37 (*)     All other components within normal limits   CBC WITH AUTO DIFFERENTIAL - Abnormal; Notable for the following components:    RBC 3.42 (*)     Hemoglobin 10.8 (*)     Hematocrit 32.2 (*)     Monocytes 17 (*)     Absolute Lymph # 0.70 (*)     All other components within normal limits             Summation      Patient Course: Patient remained in stable condition in ED. O2 sat within normal limits. Patient will be sent home. Warning signs were discussed. Return to ED if worse. ED Medications administered this visit:  Medications - No data to display    New Prescriptions from this visit:    New Prescriptions    No medications on file       Follow-up:  No follow-up provider specified. Final Impression:   1. COVID-19 virus infection    2.  History of stroke               (Please note that portions of this note were completed with a voice recognition program.  Efforts were made to edit the dictations but occasionally words are mis-transcribed.)        Starleen Curling, MD  11/26/20 [id number]       Starleen Curling, MD  11/26/20 [id number]